# Patient Record
Sex: MALE | Race: BLACK OR AFRICAN AMERICAN | NOT HISPANIC OR LATINO | Employment: PART TIME | URBAN - METROPOLITAN AREA
[De-identification: names, ages, dates, MRNs, and addresses within clinical notes are randomized per-mention and may not be internally consistent; named-entity substitution may affect disease eponyms.]

---

## 2017-06-13 ENCOUNTER — PRE VISIT (OUTPATIENT)
Dept: NEUROLOGY | Facility: CLINIC | Age: 22
End: 2017-06-13

## 2017-06-13 NOTE — TELEPHONE ENCOUNTER
1.  Date/reason for appt:7/10/17, Seizures  2.  Referring provider:Self  3.  Call to patient (Yes / No - short description):yes, (spoke to home care person)  4.  Previous care at / records requested from:   [facility]- emailed DELILAH [email or web address]

## 2020-10-23 ENCOUNTER — HOSPITAL ENCOUNTER (EMERGENCY)
Facility: CLINIC | Age: 25
Discharge: HOME | End: 2020-10-23
Attending: EMERGENCY MEDICINE | Admitting: EMERGENCY MEDICINE
Payer: MEDICARE

## 2020-10-23 VITALS
TEMPERATURE: 98.2 F | SYSTOLIC BLOOD PRESSURE: 117 MMHG | OXYGEN SATURATION: 96 % | HEART RATE: 96 BPM | DIASTOLIC BLOOD PRESSURE: 66 MMHG | WEIGHT: 220 LBS | BODY MASS INDEX: 34.45 KG/M2

## 2020-10-23 DIAGNOSIS — R51.9 ACUTE NONINTRACTABLE HEADACHE, UNSPECIFIED HEADACHE TYPE: ICD-10-CM

## 2020-10-23 LAB
ALBUMIN SERPL-MCNC: 3.7 G/DL (ref 3.4–5)
ALBUMIN UR-MCNC: NEGATIVE MG/DL
ALP SERPL-CCNC: 107 U/L (ref 40–150)
ALT SERPL W P-5'-P-CCNC: 63 U/L (ref 0–70)
ANION GAP SERPL CALCULATED.3IONS-SCNC: 7 MMOL/L (ref 3–14)
APPEARANCE UR: CLEAR
AST SERPL W P-5'-P-CCNC: 35 U/L (ref 0–45)
BASOPHILS # BLD AUTO: 0 10E9/L (ref 0–0.2)
BASOPHILS NFR BLD AUTO: 0.6 %
BILIRUB SERPL-MCNC: 0.5 MG/DL (ref 0.2–1.3)
BILIRUB UR QL STRIP: NEGATIVE
BUN SERPL-MCNC: 9 MG/DL (ref 7–30)
CALCIUM SERPL-MCNC: 9 MG/DL (ref 8.5–10.1)
CHLORIDE SERPL-SCNC: 104 MMOL/L (ref 94–109)
CO2 SERPL-SCNC: 26 MMOL/L (ref 20–32)
COLOR UR AUTO: ABNORMAL
CREAT SERPL-MCNC: 0.78 MG/DL (ref 0.66–1.25)
DIFFERENTIAL METHOD BLD: ABNORMAL
EOSINOPHIL # BLD AUTO: 0.3 10E9/L (ref 0–0.7)
EOSINOPHIL NFR BLD AUTO: 4.6 %
ERYTHROCYTE [DISTWIDTH] IN BLOOD BY AUTOMATED COUNT: 12 % (ref 10–15)
GFR SERPL CREATININE-BSD FRML MDRD: >90 ML/MIN/{1.73_M2}
GLUCOSE BLDC GLUCOMTR-MCNC: 166 MG/DL (ref 70–99)
GLUCOSE SERPL-MCNC: 105 MG/DL (ref 70–99)
GLUCOSE UR STRIP-MCNC: 100 MG/DL
HCT VFR BLD AUTO: 41.1 % (ref 40–53)
HGB BLD-MCNC: 13.5 G/DL (ref 13.3–17.7)
HGB UR QL STRIP: NEGATIVE
IMM GRANULOCYTES # BLD: 0 10E9/L (ref 0–0.4)
IMM GRANULOCYTES NFR BLD: 0.5 %
INR PPP: 1.09 (ref 0.86–1.14)
KETONES UR STRIP-MCNC: NEGATIVE MG/DL
LACTATE BLD-SCNC: 1.6 MMOL/L (ref 0.7–2)
LEUKOCYTE ESTERASE UR QL STRIP: NEGATIVE
LIPASE SERPL-CCNC: 128 U/L (ref 73–393)
LYMPHOCYTES # BLD AUTO: 1.1 10E9/L (ref 0.8–5.3)
LYMPHOCYTES NFR BLD AUTO: 17.1 %
MCH RBC QN AUTO: 31.7 PG (ref 26.5–33)
MCHC RBC AUTO-ENTMCNC: 32.8 G/DL (ref 31.5–36.5)
MCV RBC AUTO: 97 FL (ref 78–100)
MONOCYTES # BLD AUTO: 0.7 10E9/L (ref 0–1.3)
MONOCYTES NFR BLD AUTO: 10.6 %
NEUTROPHILS # BLD AUTO: 4.3 10E9/L (ref 1.6–8.3)
NEUTROPHILS NFR BLD AUTO: 66.6 %
NITRATE UR QL: NEGATIVE
NRBC # BLD AUTO: 0 10*3/UL
NRBC BLD AUTO-RTO: 0 /100
PH UR STRIP: 6 PH (ref 5–7)
PLATELET # BLD AUTO: 290 10E9/L (ref 150–450)
POTASSIUM SERPL-SCNC: 4 MMOL/L (ref 3.4–5.3)
PROT SERPL-MCNC: 7.8 G/DL (ref 6.8–8.8)
RBC # BLD AUTO: 4.26 10E12/L (ref 4.4–5.9)
RBC #/AREA URNS AUTO: 1 /HPF (ref 0–2)
SODIUM SERPL-SCNC: 137 MMOL/L (ref 133–144)
SOURCE: ABNORMAL
SP GR UR STRIP: 1.02 (ref 1–1.03)
UROBILINOGEN UR STRIP-MCNC: 2 MG/DL (ref 0–2)
WBC # BLD AUTO: 6.5 10E9/L (ref 4–11)
WBC #/AREA URNS AUTO: 1 /HPF (ref 0–5)

## 2020-10-23 PROCEDURE — 96374 THER/PROPH/DIAG INJ IV PUSH: CPT

## 2020-10-23 PROCEDURE — 83690 ASSAY OF LIPASE: CPT | Performed by: EMERGENCY MEDICINE

## 2020-10-23 PROCEDURE — 96375 TX/PRO/DX INJ NEW DRUG ADDON: CPT

## 2020-10-23 PROCEDURE — 93005 ELECTROCARDIOGRAM TRACING: CPT

## 2020-10-23 PROCEDURE — 99285 EMERGENCY DEPT VISIT HI MDM: CPT | Mod: 25

## 2020-10-23 PROCEDURE — 81001 URINALYSIS AUTO W/SCOPE: CPT | Performed by: EMERGENCY MEDICINE

## 2020-10-23 PROCEDURE — 258N000003 HC RX IP 258 OP 636: Performed by: EMERGENCY MEDICINE

## 2020-10-23 PROCEDURE — 250N000011 HC RX IP 250 OP 636: Performed by: EMERGENCY MEDICINE

## 2020-10-23 PROCEDURE — 80053 COMPREHEN METABOLIC PANEL: CPT | Performed by: EMERGENCY MEDICINE

## 2020-10-23 PROCEDURE — 96361 HYDRATE IV INFUSION ADD-ON: CPT

## 2020-10-23 PROCEDURE — 999N001017 HC STATISTIC GLUCOSE BY METER IP

## 2020-10-23 PROCEDURE — 85610 PROTHROMBIN TIME: CPT | Performed by: EMERGENCY MEDICINE

## 2020-10-23 PROCEDURE — 85025 COMPLETE CBC W/AUTO DIFF WBC: CPT | Performed by: EMERGENCY MEDICINE

## 2020-10-23 PROCEDURE — 99284 EMERGENCY DEPT VISIT MOD MDM: CPT | Mod: 25

## 2020-10-23 PROCEDURE — 83605 ASSAY OF LACTIC ACID: CPT | Performed by: EMERGENCY MEDICINE

## 2020-10-23 RX ORDER — METOCLOPRAMIDE HYDROCHLORIDE 5 MG/ML
10 INJECTION INTRAMUSCULAR; INTRAVENOUS ONCE
Status: COMPLETED | OUTPATIENT
Start: 2020-10-23 | End: 2020-10-23

## 2020-10-23 RX ORDER — KETOROLAC TROMETHAMINE 30 MG/ML
30 INJECTION, SOLUTION INTRAMUSCULAR; INTRAVENOUS ONCE
Status: COMPLETED | OUTPATIENT
Start: 2020-10-23 | End: 2020-10-23

## 2020-10-23 RX ORDER — ONDANSETRON 4 MG/1
4 TABLET, ORALLY DISINTEGRATING ORAL EVERY 6 HOURS PRN
Qty: 10 TABLET | Refills: 0 | Status: SHIPPED | OUTPATIENT
Start: 2020-10-23 | End: 2020-10-26

## 2020-10-23 RX ORDER — DEXAMETHASONE SODIUM PHOSPHATE 10 MG/ML
10 INJECTION, SOLUTION INTRAMUSCULAR; INTRAVENOUS ONCE
Status: COMPLETED | OUTPATIENT
Start: 2020-10-23 | End: 2020-10-23

## 2020-10-23 RX ORDER — SUMATRIPTAN 5 MG/1
SPRAY NASAL
Qty: 1 EACH | Refills: 0 | Status: ON HOLD | OUTPATIENT
Start: 2020-10-23 | End: 2024-07-04

## 2020-10-23 RX ORDER — ONDANSETRON 4 MG/1
4 TABLET, ORALLY DISINTEGRATING ORAL ONCE
Status: COMPLETED | OUTPATIENT
Start: 2020-10-23 | End: 2020-10-23

## 2020-10-23 RX ORDER — ONDANSETRON 2 MG/ML
4 INJECTION INTRAMUSCULAR; INTRAVENOUS EVERY 30 MIN PRN
Status: DISCONTINUED | OUTPATIENT
Start: 2020-10-23 | End: 2020-10-23

## 2020-10-23 RX ORDER — HYDROMORPHONE HYDROCHLORIDE 1 MG/ML
0.5 INJECTION, SOLUTION INTRAMUSCULAR; INTRAVENOUS; SUBCUTANEOUS
Status: DISCONTINUED | OUTPATIENT
Start: 2020-10-23 | End: 2020-10-23

## 2020-10-23 RX ORDER — DIPHENHYDRAMINE HYDROCHLORIDE 50 MG/ML
25 INJECTION INTRAMUSCULAR; INTRAVENOUS ONCE
Status: COMPLETED | OUTPATIENT
Start: 2020-10-23 | End: 2020-10-23

## 2020-10-23 RX ORDER — SODIUM CHLORIDE 9 MG/ML
INJECTION, SOLUTION INTRAVENOUS CONTINUOUS
Status: DISCONTINUED | OUTPATIENT
Start: 2020-10-23 | End: 2020-10-24 | Stop reason: HOSPADM

## 2020-10-23 RX ADMIN — DIPHENHYDRAMINE HYDROCHLORIDE 25 MG: 50 INJECTION, SOLUTION INTRAMUSCULAR; INTRAVENOUS at 21:04

## 2020-10-23 RX ADMIN — DEXAMETHASONE SODIUM PHOSPHATE 10 MG: 10 INJECTION, SOLUTION INTRAMUSCULAR; INTRAVENOUS at 21:29

## 2020-10-23 RX ADMIN — KETOROLAC TROMETHAMINE 30 MG: 30 INJECTION, SOLUTION INTRAMUSCULAR at 21:29

## 2020-10-23 RX ADMIN — METOCLOPRAMIDE HYDROCHLORIDE 10 MG: 5 INJECTION INTRAMUSCULAR; INTRAVENOUS at 21:06

## 2020-10-23 RX ADMIN — SODIUM CHLORIDE 1000 ML: 9 INJECTION, SOLUTION INTRAVENOUS at 21:04

## 2020-10-23 RX ADMIN — ONDANSETRON 4 MG: 4 TABLET, ORALLY DISINTEGRATING ORAL at 19:12

## 2020-10-23 NOTE — ED AVS SNAPSHOT
[facility] Emergency Dept  [address]  [facility] [address]  Phone: [phone]  Fax: [phone]                                    Eric Fall   MRN: [id number]    Department: [facility] Emergency Dept   Date of Visit: 10/23/2020           After Visit Summary Signature Page    I have received my discharge instructions, and my questions have been answered. I have discussed any challenges I see with this plan with the nurse or doctor.    ..........................................................................................................................................  Patient/Patient Representative Signature      ..........................................................................................................................................  Patient Representative Print Name and Relationship to Patient    ..................................................               ................................................  Date                                   Time    ..........................................................................................................................................  Reviewed by Signature/Title    ...................................................              ..............................................  Date                                               Time          22EPIC Rev 08/18

## 2020-10-24 ENCOUNTER — APPOINTMENT (OUTPATIENT)
Dept: GENERAL RADIOLOGY | Facility: CLINIC | Age: 25
End: 2020-10-24
Attending: EMERGENCY MEDICINE
Payer: MEDICARE

## 2020-10-24 ENCOUNTER — HOSPITAL ENCOUNTER (EMERGENCY)
Facility: CLINIC | Age: 25
Discharge: HOME | End: 2020-10-24
Attending: EMERGENCY MEDICINE | Admitting: EMERGENCY MEDICINE
Payer: MEDICARE

## 2020-10-24 VITALS
HEART RATE: 88 BPM | SYSTOLIC BLOOD PRESSURE: 108 MMHG | DIASTOLIC BLOOD PRESSURE: 61 MMHG | TEMPERATURE: 98.3 F | RESPIRATION RATE: 16 BRPM

## 2020-10-24 DIAGNOSIS — R11.2 NON-INTRACTABLE VOMITING WITH NAUSEA, UNSPECIFIED VOMITING TYPE: ICD-10-CM

## 2020-10-24 DIAGNOSIS — Z20.822 SUSPECTED COVID-19 VIRUS INFECTION: ICD-10-CM

## 2020-10-24 DIAGNOSIS — R51.9 NONINTRACTABLE EPISODIC HEADACHE, UNSPECIFIED HEADACHE TYPE: ICD-10-CM

## 2020-10-24 DIAGNOSIS — R73.9 HYPERGLYCEMIA: ICD-10-CM

## 2020-10-24 LAB
ANION GAP SERPL CALCULATED.3IONS-SCNC: 9 MMOL/L (ref 3–14)
BASE DEFICIT BLDV-SCNC: 1.7 MMOL/L
BASOPHILS # BLD AUTO: 0 10E9/L (ref 0–0.2)
BASOPHILS NFR BLD AUTO: 0.2 %
BUN SERPL-MCNC: 13 MG/DL (ref 7–30)
CALCIUM SERPL-MCNC: 8.5 MG/DL (ref 8.5–10.1)
CHLORIDE SERPL-SCNC: 101 MMOL/L (ref 94–109)
CO2 SERPL-SCNC: 23 MMOL/L (ref 20–32)
CREAT SERPL-MCNC: 0.84 MG/DL (ref 0.66–1.25)
DIFFERENTIAL METHOD BLD: ABNORMAL
EOSINOPHIL # BLD AUTO: 0 10E9/L (ref 0–0.7)
EOSINOPHIL NFR BLD AUTO: 0 %
ERYTHROCYTE [DISTWIDTH] IN BLOOD BY AUTOMATED COUNT: 12.3 % (ref 10–15)
GFR SERPL CREATININE-BSD FRML MDRD: >90 ML/MIN/{1.73_M2}
GLUCOSE SERPL-MCNC: 273 MG/DL (ref 70–99)
HCO3 BLDV-SCNC: 24 MMOL/L (ref 21–28)
HCT VFR BLD AUTO: 36.4 % (ref 40–53)
HGB BLD-MCNC: 12.8 G/DL (ref 13.3–17.7)
IMM GRANULOCYTES # BLD: 0 10E9/L (ref 0–0.4)
IMM GRANULOCYTES NFR BLD: 0.3 %
INTERPRETATION ECG - MUSE: NORMAL
LYMPHOCYTES # BLD AUTO: 1.2 10E9/L (ref 0.8–5.3)
LYMPHOCYTES NFR BLD AUTO: 10.3 %
MCH RBC QN AUTO: 33.1 PG (ref 26.5–33)
MCHC RBC AUTO-ENTMCNC: 35.2 G/DL (ref 31.5–36.5)
MCV RBC AUTO: 94 FL (ref 78–100)
MONOCYTES # BLD AUTO: 1 10E9/L (ref 0–1.3)
MONOCYTES NFR BLD AUTO: 9.1 %
NEUTROPHILS # BLD AUTO: 9.2 10E9/L (ref 1.6–8.3)
NEUTROPHILS NFR BLD AUTO: 80.1 %
NRBC # BLD AUTO: 0 10*3/UL
NRBC BLD AUTO-RTO: 0 /100
O2/TOTAL GAS SETTING VFR VENT: 0 %
PCO2 BLDV: 41 MM HG (ref 40–50)
PH BLDV: 7.37 PH (ref 7.32–7.43)
PLATELET # BLD AUTO: 339 10E9/L (ref 150–450)
PO2 BLDV: 63 MM HG (ref 25–47)
POTASSIUM SERPL-SCNC: 4.6 MMOL/L (ref 3.4–5.3)
RBC # BLD AUTO: 3.87 10E12/L (ref 4.4–5.9)
SODIUM SERPL-SCNC: 133 MMOL/L (ref 133–144)
WBC # BLD AUTO: 11.5 10E9/L (ref 4–11)

## 2020-10-24 PROCEDURE — C9803 HOPD COVID-19 SPEC COLLECT: HCPCS

## 2020-10-24 PROCEDURE — 82803 BLOOD GASES ANY COMBINATION: CPT | Performed by: EMERGENCY MEDICINE

## 2020-10-24 PROCEDURE — 99284 EMERGENCY DEPT VISIT MOD MDM: CPT | Mod: 25

## 2020-10-24 PROCEDURE — 250N000011 HC RX IP 250 OP 636: Performed by: EMERGENCY MEDICINE

## 2020-10-24 PROCEDURE — 80048 BASIC METABOLIC PNL TOTAL CA: CPT | Performed by: EMERGENCY MEDICINE

## 2020-10-24 PROCEDURE — 258N000003 HC RX IP 258 OP 636: Performed by: EMERGENCY MEDICINE

## 2020-10-24 PROCEDURE — U0003 INFECTIOUS AGENT DETECTION BY NUCLEIC ACID (DNA OR RNA); SEVERE ACUTE RESPIRATORY SYNDROME CORONAVIRUS 2 (SARS-COV-2) (CORONAVIRUS DISEASE [COVID-19]), AMPLIFIED PROBE TECHNIQUE, MAKING USE OF HIGH THROUGHPUT TECHNOLOGIES AS DESCRIBED BY CMS-2020-01-R: HCPCS | Performed by: EMERGENCY MEDICINE

## 2020-10-24 PROCEDURE — 96361 HYDRATE IV INFUSION ADD-ON: CPT

## 2020-10-24 PROCEDURE — 96374 THER/PROPH/DIAG INJ IV PUSH: CPT

## 2020-10-24 PROCEDURE — 71045 X-RAY EXAM CHEST 1 VIEW: CPT

## 2020-10-24 PROCEDURE — 85025 COMPLETE CBC W/AUTO DIFF WBC: CPT | Performed by: EMERGENCY MEDICINE

## 2020-10-24 RX ORDER — METOCLOPRAMIDE HYDROCHLORIDE 5 MG/ML
10 INJECTION INTRAMUSCULAR; INTRAVENOUS ONCE
Status: COMPLETED | OUTPATIENT
Start: 2020-10-24 | End: 2020-10-24

## 2020-10-24 RX ORDER — METOCLOPRAMIDE 10 MG/1
10 TABLET ORAL 3 TIMES DAILY PRN
Qty: 30 TABLET | Refills: 0 | Status: SHIPPED | OUTPATIENT
Start: 2020-10-24 | End: 2022-01-14

## 2020-10-24 RX ORDER — SODIUM CHLORIDE 9 MG/ML
INJECTION, SOLUTION INTRAVENOUS CONTINUOUS
Status: DISCONTINUED | OUTPATIENT
Start: 2020-10-24 | End: 2020-10-25 | Stop reason: HOSPADM

## 2020-10-24 RX ADMIN — METOCLOPRAMIDE HYDROCHLORIDE 10 MG: 5 INJECTION INTRAMUSCULAR; INTRAVENOUS at 21:05

## 2020-10-24 RX ADMIN — SODIUM CHLORIDE 1000 ML: 9 INJECTION, SOLUTION INTRAVENOUS at 21:06

## 2020-10-24 ASSESSMENT — ENCOUNTER SYMPTOMS
NAUSEA: 1
FEVER: 0
FREQUENCY: 1
VOMITING: 1
COUGH: 1
HEADACHES: 1
DYSURIA: 0

## 2020-10-24 NOTE — ED PROVIDER NOTES
History   Chief Complaint:  Vomiting and Headache     HPI   Eric Fall is a 25 year old male with history of TBI, headache disorder, and type 2 diabetes mellitus who presents with several days of his migraine headache with nausea  and vomiting.  States that he usually takes Imitrex nasal spray but has not had any.  He describes as light sensitivity and nausea and a generalized headache that is similar to his prior migraines. He denies fever, cough, shortness of breath, abdominal pain, or chest pain.    Allergies:  Haldol   Quetiapine  Risperdal  Zyprexa  Trazodone    Medications:   Insulin Aspart  Guanfacine  Basaglar  Lantus  Atropine  Desmopressin  Loxapine  Protonix  Metformin   Atorvastatin  Amlodipine   Imitrex    Past Medical History:    ADHD  Diabetes mellitus type 2  Hip dysplasia  Mental retardation  Oppositional defiant disorder  PTSD  Seizures  TBI   Hypertension  Schizophrenia   Chronic tachycardia   Headache disorder     Past Surgical History:    Bilateral hip surgery      Family History:    No past pertinent family history.    Social History:  Smoking Status: Never Smoker  Smokeless Tobacco: Never Used  Alcohol Use: No  Drug Use: No  PCP: Michelle Jackman     Review of Systems  Please see HPI. All other systems reviewed and negative.     Physical Exam     Patient Vitals for the past 24 hrs:   BP Temp Temp src Pulse SpO2 Weight   10/23/20 2145 117/66 -- -- 96 96 % --   10/23/20 2130 121/68 -- -- 97 99 % --   10/23/20 2115 130/70 -- -- 103 -- --   10/23/20 2100 138/88 -- -- 102 -- --   10/23/20 2045 132/88 -- -- 108 -- --   10/23/20 2000 (!) 147/92 -- -- 105 99 % --   10/23/20 1912 124/78 -- -- -- -- --   10/23/20 1908 -- 98.2  F (36.8  C) Temporal 119 98 % 99.8 kg (220 lb)       Physical Exam  Constitutional:       Appearance: He is well-developed.   HENT:      Right Ear: Tympanic membrane and external ear normal.      Left Ear: Tympanic membrane and external ear normal.       Mouth/Throat:      Mouth: Mucous membranes are moist.      Pharynx: Oropharynx is clear. No oropharyngeal exudate or posterior oropharyngeal erythema.   Eyes:      General: No scleral icterus.     Conjunctiva/sclera: Conjunctivae normal.      Pupils: Pupils are equal, round, and reactive to light.   Neck:      Musculoskeletal: Normal range of motion and neck supple.   Cardiovascular:      Rate and Rhythm: Normal rate and regular rhythm.      Heart sounds: Normal heart sounds. No murmur. No friction rub. No gallop.    Pulmonary:      Effort: Pulmonary effort is normal. No respiratory distress.      Breath sounds: Normal breath sounds. No wheezing or rales.   Abdominal:      General: Bowel sounds are normal. There is no distension.      Palpations: Abdomen is soft. There is no mass.      Tenderness: There is no abdominal tenderness.   Musculoskeletal: Normal range of motion.   Skin:     General: Skin is warm and dry.      Findings: No rash.   Neurological:      General: No focal deficit present.      Mental Status: He is alert.      Cranial Nerves: No cranial nerve deficit.           Emergency Department Course   ECG:  ECG taken at 2015  Sinus tachycardia  Otherwise normal ECG  Rate 105 bpm. PA interval 150 ms. QRS duration 78 ms. QT/QTc 328/433 ms. P-R-T axes 52  26  37.    Laboratory:  Laboratory findings were communicated with the patient who voiced understanding of the findings.    CBC: WBC 6.5, HGB 13.5,   CMP: glucose 105 o/w WNL (Creatinine 0.78)  INR: 1.09  Lactic acid whole blood(result time 2102) 1.6   Lipase: 128    Glucose by meter(1909): 166(H)     Interventions:  1912 Zofran 4 mg IV  2104 NS 1000 mL IV  2104 Benadryl 25 mg IV  2106 Reglan 10 mg IV  2129 Toradol 30 mg IV  2129 Decadron 10 mg IV    Emergency Department Course:  Nursing notes and vitals reviewed.    2010 I performed an exam of the patient as documented above.     2219 I was informed the patient's headache is improved.     2240 I  rechecked the patient. Explained findings to the Patient.    Findings and plan explained to the Patient. Patient discharged home with instructions regarding supportive care, medications, and reasons to return. The importance of close follow-up was reviewed. The patient was prescribed Zofran and Imitrex.      Impression & Plan      Medical Decision Making:  Eric Fall is a 25 year old male who presents with a headache. He has history of headaches.  I considered a broad differential diagnosis for this patient including tension, migraine, analgesic rebound, occipital neuralgia, etc.  I also considered other less common but serious causes considered included meningitis, encephalitis, subarachnoid bleed, temporal arteritis, stroke, tumor, etc.  He has no signs of serious headache etiologies at this point.  No advanced imaging is indicated, nor is CT/lumbar puncture for SAH.  His questions were answered and he feels improved after above interventions in ED.  Supportive outpatient management is therefore indicated.  Headache precautions given for home.      Diagnosis:    ICD-10-CM    1. Acute nonintractable headache, unspecified headache type  R51.9        Disposition:   discharged to home    Discharge Medications:  New Prescriptions    ONDANSETRON (ZOFRAN ODT) 4 MG ODT TAB    Take 1 tablet (4 mg) by mouth every 6 hours as needed for nausea    SUMATRIPTAN (IMITREX) 5 MG/ACT NASAL SPRAY    1-2 sprays,  may repeat every 2 hours until a maximum dose 40mg/24 hours       Scribe Disclosure:  Caitlyn MENDOZA, am serving as a scribe at 8:15 PM on 10/23/2020 to document services personally performed by Denis Monet MD based on my observations and the provider's statements to me.   [facility] EMERGENCY DEPARTMENT       Denis Monet MD  10/23/20 6946

## 2020-10-24 NOTE — ED TRIAGE NOTES
"Pt reports complex medical hx of diabetes. HTN and TBI. Pt reports headache, elevated BS and vomiting since Wed. He notes diarrhea for \"weeks\"  "

## 2020-10-24 NOTE — DISCHARGE INSTRUCTIONS
Use zofran as needed  Discharge Instructions  Headache    You were seen today for a headache. Headaches may be caused by many different things such as muscle tension, sinus inflammation, anxiety and stress, having too little sleep, too much alcohol, some medical conditions or injury. You may have a migraine, which is caused by changes in the blood vessels in your head.  At this time your provider does not find that your headache is a sign of anything dangerous or life-threatening.  However, sometimes the signs of serious illness do not show up right away.      Generally, every Emergency Department visit should have a follow-up clinic visit with either a primary or a specialty clinic/provider. Please follow-up as instructed by your emergency provider today.    Return to the Emergency Department if:  You get a new fever of 100.4 F or higher.  Your headache gets much worse.  You get a stiff neck with your headache.  You get a new headache that is significantly different or worse than headaches you have had before.  You are vomiting (throwing up) and cannot keep food or water down.  You have blurry or double vision or other problems with your eyes.  You have a new weakness on one side of your body.  You have difficulty with balance which is new.  You or your family thinks you are confused.  You have a seizure.    What can I do to help myself?  Pain medications - You may take a pain medication such as Tylenol  (acetaminophen), Advil , Motrin  (ibuprofen) or Aleve  (naproxen).  Take a pain reliever as soon as you notice symptoms.  Starting medications as soon as you start to have symptoms may lessen the amount of pain you have.  Relaxing in a quiet, dark room may help.  Get enough sleep and eat meals regularly.  You may need to watch for certain foods or other things which may trigger your headaches.  Keeping a journal of your headaches and possible triggers may help you and your primary provider to identify things which  you should avoid which may be causing your headaches.  If you were given a prescription for medicine here today, be sure to read all of the information (including the package insert) that comes with your prescription.  This will include important information about the medicine, its side effects, and any warnings that you need to know about.  The pharmacist who fills the prescription can provide more information and answer questions you may have about the medicine.  If you have questions or concerns that the pharmacist cannot address, please call or return to the Emergency Department.   Remember that you can always come back to the Emergency Department if you are not able to see your regular provider in the amount of time listed above, if you get any new symptoms, or if there is anything that worries you.

## 2020-10-24 NOTE — ED AVS SNAPSHOT
[facility] Emergency Dept  [address] [facility] [address]  Phone: [phone]  Fax: [phone]                                    Eric Fall   MRN: [id number]    Department: [facility] Emergency Dept   Date of Visit: 10/24/2020           After Visit Summary Signature Page    I have received my discharge instructions, and my questions have been answered. I have discussed any challenges I see with this plan with the nurse or doctor.    ..........................................................................................................................................  Patient/Patient Representative Signature      ..........................................................................................................................................  Patient Representative Print Name and Relationship to Patient    ..................................................               ................................................  Date                                   Time    ..........................................................................................................................................  Reviewed by Signature/Title    ...................................................              ..............................................  Date                                               Time          22EPIC Rev 08/18

## 2020-10-25 NOTE — ED PROVIDER NOTES
History     Chief Complaint:  Vomiting, Headache, and Cough      HPI   Eric Fall is a 25 year old male who presents for the evaluation of vomiting, headache, and cough. Of note, the patient was seen at [facility] yesterday for headache and vomiting, was treated with IV fluids, Zofran, Benadryl, Reglan, Toradol, and Decadron, had negative blood laboratory testing, and was discharged to home started on Imitrex and Zofran. The patient reports that since being discharged from the hospital yesterday, he has still been experiencing a persistent headache, cough, and vomiting, prompting him to the ED. Patient states that his headache is typical to his migraines and that he was not able to fill the Imitrex yet. Patient also endorses increased frequency of urination. No known sick contacts. The patient denies fever, dysuria, and other issues.      Laboratory Results from Yesterday done at  [facility]:  CBC: WBC 6.5, HGB 13.5,   CMP: glucose 105 o/w WNL (Creatinine 0.78)  INR: 1.09  Lactic acid whole blood(result time 2102) 1.6   Lipase: 128  Glucose by meter(1909): 166(H)     Allergies:  Haldol  Quetiapine  Risperdal  Zyprexa      Medications:    Abilify  Benadryl  Depakote  Neurontin  Lactaid  Zofran  Imitrex     Past Medical History:    ADHD  Diabetes  Hip dysplasia  Mental retardation  Oppositional defiant disorder  PTSD  Seizures  TBI  Dysuria  Anal fissure  Constipation  Suicidal ideation    Past Surgical History:    History reviewed. No pertinent surgical history.     Family History:    History reviewed. No pertinent family history.      Social History:  Smoking status: Never smoker  Alcohol use: No   Drug use: No  PCP: Michelle Jackman   Marital Status:  Single [1]     Review of Systems   Constitutional: Negative for fever.   Respiratory: Positive for cough.    Gastrointestinal: Positive for nausea and vomiting.   Genitourinary: Positive for frequency. Negative for  dysuria.   Neurological: Positive for headaches.   All other systems reviewed and are negative.        Physical Exam     Patient Vitals for the past 24 hrs:   BP Temp Temp src Pulse Resp   10/24/20 2100 108/61 98.3  F (36.8  C) Oral 88 16      Physical Exam  General: Alert, interactive in mild distress  Head:  Scalp is atraumatic  Eyes:  The pupils are equal, round, and reactive to light    EOM's intact    No scleral icterus  ENT:      Nose:  The external nose is normal  Ears:  External ears are normal  Mouth/Throat: The oropharynx is normal    Mucus membranes are dry      Neck:  Normal range of motion.      There is no rigidity.    Trachea is in the midline         CV:  Regular rate and rhythm    No murmur   Resp:  Breath sounds are clear bilaterally    Non-labored, no retractions or accessory muscle use      GI:  Abdomen is soft, no distension, no tenderness.       MS:  Normal strength in all 4 extremities  Skin:  Warm and dry, No rash or lesions noted.  Neuro: Strength 5/5 x4.  Sensation intact  In all 4 extremities.      Cranial nerves 2-12 grossly intact.    GCS: 15  Psych:  Awake. Alert.  Normal affect.      Appropriate interactions.    Emergency Department Course   Imaging:  Radiographic findings were communicated with the patient who voiced understanding of the findings.  XR Chest Port 1 View   IMPRESSION:   1. Hypoaeration. This could be due to poor inspiratory effort.  2. Top normal size heart is likely due to the AP technique and  hypoaeration. Cardiomegaly is considered less likely.  3. No other evidence of acute cardiopulmonary disease is seen. No  pneumonia is identified.  As read by Radiology.    Laboratory:  BMP: Glucose 273 (H), WNL (Creatinine 0.84)  CBC: WBC 11.5 (H), HGB 12.8 (L), WNL ()  Blood gas venous: pO2 63 (H)    Symptomatic COVID-19 Virus (Coronavirus) by PCR Nasopharyngeal swab: Pending     Interventions:  2105, Reglan, 10 mg, IV  2106, NS 1L IV Bolus     Emergency Department  Course:  Past medical records, nursing notes, and vitals reviewed.  2110: I performed an exam of the patient and obtained history, as documented above.     IV inserted and blood drawn.     The patient had a portable chest x-ray performed, findings above.    2220: I rechecked the patient. Explained findings to patient.     Findings and plan explained to the Patient. Patient discharged home with instructions regarding supportive care, medications, and reasons to return. The importance of close follow-up was reviewed. The patient was prescribed Reglan.       Impression & Plan    Covid-19  Eric Fall was evaluated during a global COVID-19 pandemic, which necessitated consideration that the patient might be at risk for infection with the SARS-CoV-2 virus that causes COVID-19.   Applicable protocols for evaluation were followed during the patient's care. COVID-19 was considered as part of the patient's evaluation. The plan for testing is: a test was obtained during this visit.    Medical Decision Making:  Eric Fall is a 25 year old male who was seen and evaluated. Yesterday's workup was reviewed. Findings are consistent with a likely viral process, potentially COVID-19 infection. There are no signs of hypoxia or pulmonary infiltrate to suggest pneumonia. No signs of meningitis, severe sepsis, septic shock, or more concerning illness. His UA was unremarkable yesterday and I do not believe this needed to be repeated. He did receive steroids yesterday and does have hyperglycemia, I think this is likely cause and effect. There is no signs of diabetic ketoacidosis. I will discharge him with Reglan for both is headache and nausea/vomiting. I have recommended follow up with his PCP and quarantine until his symptoms are resolved.     Diagnosis:    ICD-10-CM    1. Suspected COVID-19 virus infection  Z20.828    2. Nonintractable episodic headache, unspecified headache type  R51.9    3. Non-intractable  vomiting with nausea, unspecified vomiting type  R11.2    4. Hyperglycemia  R73.9        Disposition:  Discharged to home with Reglan.    Discharge Medications:  New Prescriptions    METOCLOPRAMIDE (REGLAN) 10 MG TABLET    Take 1 tablet (10 mg) by mouth 3 times daily as needed (N/V, Headache)     Scribe Disclosure:  I, David Pulliam, am serving as a scribe at 8:29 PM on 10/24/2020 to document services personally performed by Elver Abreu MD based on my observations and the provider's statements to me.      David Pulliam  10/24/2020   [facility] EMERGENCY DEPT       Elver Abreu MD  10/24/20 2488

## 2020-10-25 NOTE — DISCHARGE INSTRUCTIONS
Discharge Instructions  COVID-19    COVID-19 is the disease caused by a new coronavirus. The virus spreads from person-to-person primarily by droplets when an infected person coughs or sneezes and the droplet either lands on another person or that other person touches a surface with the droplet on it. There are tests available to diagnose COVID-19. There is no specific treatment or medicine for the disease.    You may have been diagnosed with COVID, may be being tested for COVID and have a pending test result, or may have been exposed to COVID.    Symptoms of COVID-19  Many people have no symptoms or mild symptoms.  Symptoms may usually appear 4 to 5 days (up to 14 days) after contact with a person with COVID-19. Some people will get severe symptoms and pneumonia. Usual symptoms are:     ? Fever  ? Cough  ? Trouble breathing    Less common symptoms are: Headache, body aches, sore throat, sneezing, diarrhea,loss of taste or smell.    Isolation and Quarantine    You were seen because you have symptoms, had an exposure, or had some other concern about possible COVID. The best way to stop the spread of the virus is to avoid contact with others.  Isolation refers to sick people staying away from people who are not sick. A person in quarantine is limiting activity because they were exposed and are waiting to see if they might become sick.    If you test positive for COVID, you should stay home (isolation) for at least 10 days after your symptoms began, and for 24 hours with no fever and improvement of symptoms--whichever is longer. (Your fever should be gone for 24 hours without using fever-reducing medicine). If you have no symptoms, you should stay home (isolation) for 10 days from the day of the test.    For example, if you have a fever and cough for 6 days, you need to stay home 4 more days with no fever for a total of 10 days. Or, if you have a fever and cough for 10 days, you need to stay home one more day with no  fever for a total of 11 days.    If you have a high-risk exposure to COVID (you spent 15 minutes or more within six feet of somebody who has COVID), you should stay home (quarantine) for 14 days. Even if you test negative for COVID, the CDC recommends a 14-day quarantine from the time of your last exposure to that individual.    If you have symptoms but a negative test, you should stay at home until you are symptom-free and without fever for 24 hours, using the same judgment you would for when it is safe to return to work/school from strep throat, influenza, or the common cold. If you worsen, you should consider being re-evaluated.    If you are being tested for COVID and your test is pending, you should stay home until you know your test result.    How should I protect myself and others?    Do not go to work or school. Have a friend or relative do your shopping. Do not use public transportation (bus, train) or ridesharing (Lyft, Uber).    Separate yourself from other people in your home.?As much as possible, you should stay in one room and away from other people in your home. Also, use a separate bathroom, if possible. Avoid handling pets or other animals while sick.     Wear a facemask if you need to be around other people and cover your mouth and nose with a tissue when you cough or sneeze.     Avoid sharing personal household items. You should not share dishes, drinking glasses, forks/knives/spoons, towels, or bedding with other people in your home. After using these items, they should be washed with soap and water. Clean parts of your home that are touched often (doorknobs, faucets, countertops, etc.) daily.     Wash your hands often with soap and water for at least 20 seconds or use an alcohol-based hand  containing at least 60% alcohol.     Avoid touching your face.    Treat your symptoms. You can take Acetaminophen (Tylenol) to treat body aches and fever as needed for comfort. Ibuprofen (Advil or  Motrin) can be used as well if you still have symptoms after taking Tylenol. Drink fluids. Rest.    Watch for worsening symptoms such as shortness of breath/difficulty breathing or very severe weakness.    Employers/workplaces are being asked by the Centers for Disease Control (CDC) to not request notes/documentation for you to return to work or prove that you were ill. You may choose to show your employer this paperwork. Also, repeat testing should not be required to return to work.    Return to the Emergency Department if:    If you are developing worsening breathing, shortness of breath, or feel worse you should seek medical attention.  If you are uncertain, contact your health care provider/clinic. If you need emergency medical attention, call 911 and tell them you have been ill.

## 2020-10-25 NOTE — ED NOTES
Patient's group [place] staff- [facility] [phone]. Call with updates or when pt needs a ride

## 2020-10-25 NOTE — ED TRIAGE NOTES
Pt presents with complaints for HA, coughing and vomiting. Pt was seen yesterday and prescribed zofran and imitrex- taking zofran but did not fill imitrex. Pt diabetic and reports high glucose as well. Pt had recent negative covid test.

## 2020-10-26 ENCOUNTER — NURSE TRIAGE (OUTPATIENT)
Dept: NURSING | Facility: CLINIC | Age: 25
End: 2020-10-26

## 2020-10-26 LAB
SARS-COV-2 RNA SPEC QL NAA+PROBE: ABNORMAL
SPECIMEN SOURCE: ABNORMAL

## 2020-10-26 NOTE — TELEPHONE ENCOUNTER
"His home is calling back, so he can hear the precautions he needs to take , now that he is positive for covid.  Coronavirus (COVID-19) Notification    Caller Name (Patient, parent, daughter/son, grandparent, etc)  Patient: Eric Aviles    Reason for call  Notify of Positive Coronavirus (COVID-19) lab results, assess symptoms,  review [facility] recommendations    Lab Result    Lab test:  2019-nCoV rRt-PCR or SARS-CoV-2 PCR    Oropharyngeal AND/OR nasopharyngeal swabs is POSITIVE for 2019-nCoV RNA/SARS-COV-2 PCR (COVID-19 virus)    RN Recommendations/Instructions per [facility] Coronavirus COVID-19 recommendations    Brief introduction script  Introduce self then review script:  \"I am calling on behalf of [employer].  We were notified that your Coronavirus test (COVID-19) for was POSITIVE for the virus.  I have some information to relay to you but first I wanted to mention that the [place] Dept of Health will be contacting you shortly [it's possible MD already called Patient] to talk to you more about how you are feeling and other people you have had contact with who might now also have the virus.  Also, [facility] is Partnering with the [facility] for Covid-19 research, you may be contacted directly by research staff.\"    Assessment (Inquire about Patient's current symptoms)   Assessment   Current Symptoms at time of phone call: (if no symptoms, document No symptoms]   Cough, headache   Symptoms onset (if applicable) A couple of days     If at time of call, Patients symptoms hare worsened, the Patient should contact 911 or have someone drive them to Emergency Dept promptly:      If Patient calling 911, inform 911 personal that you have tested positive for the Coronavirus (COVID-19).  Place mask on and await 911 to arrive.    If Emergency Dept, If possible, please have another adult drive you to the Emergency Dept but you need to wear mask when in contact with other people.      Review " information with Patient    Your result was positive. This means you have COVID-19 (coronavirus).  We have sent you a letter that reviews the information that I'll be reviewing with you now.    How can I protect others?    If you have symptoms: stay home and away from others (self-isolate) until:    You've had no fever--and no medicine that reduces fever--for 1 full day (24 hours). And       Your other symptoms have gotten better. For example, your cough or breathing has improved. And     At least 10 days have passed since your symptoms started. (If you've been told by a doctor that you have a weak immune system, wait 20 days.)     If you don't have symptoms: Stay home and away from others (self-isolate) until at least 10 days have passed since your first positive COVID-19 test. (Date test collected)    During this time:    Stay in your own room, including for meals. Use your own bathroom if you can.    Stay away from others in your home. No hugging, kissing or shaking hands. No visitors.     Don't go to work, school or anywhere else.     Clean  high touch  surfaces often (doorknobs, counters, handles, etc.). Use a household cleaning spray or wipes. You'll find a full list on the EPA website at www.epa.gov/pesticide-registration/list-n-disinfectants-use-against-sars-cov-2.     Cover your mouth and nose with a mask, tissue or other face covering to avoid spreading germs.    Wash your hands and face often with soap and water.    Caregivers in these groups are at risk for severe illness due to COVID-19:  o People 65 years and older  o People who live in a nursing home or long-term care facility  o People with chronic disease (lung, heart, cancer, diabetes, kidney, liver, immunologic)  o People who have a weakened immune system, including those who:  - Are in cancer treatment  - Take medicine that weakens the immune system, such as corticosteroids  - Had a bone marrow or organ transplant  - Have an immune  deficiency  - Have poorly controlled HIV or AIDS  - Are obese (body mass index of 40 or higher)  - Smoke regularly    Caregivers should wear gloves while washing dishes, handling laundry and cleaning bedrooms and bathrooms.    Wash and dry laundry with special caution. Don't shake dirty laundry, and use the warmest water setting you can.    If you have a weakened immune system, ask your doctor about other actions you should take.    For more tips, go to www.cdc.gov/coronavirus/2019-ncov/downloads/10Things.pdf.    You should not go back to work until you meet the guidelines above for ending your home isolation. You don't need to be retested for COVID-19 before going back to work--studies show that you won't spread the virus if it's been at least 10 days since your symptoms started (or 20 days, if you have a weak immune system).    Employers: This document serves as formal notice of your employee's medical guidelines for going back to work. They must meet the above guidelines before going back to work in person.    How can I take care of myself?    1. Get lots of rest. Drink extra fluids (unless a doctor has told you not to).    2. Take Tylenol (acetaminophen) for fever or pain. If you have liver or kidney problems, ask your family doctor if it's okay to take Tylenol.     Take either:     650 mg (two 325 mg pills) every 4 to 6 hours, or     1,000 mg (two 500 mg pills) every 8 hours as needed.     Note: Don't take more than 3,000 mg in one day. Acetaminophen is found in many medicines (both prescribed and over-the-counter medicines). Read all labels to be sure you don't take too much.    For children, check the Tylenol bottle for the right dose (based on their age or weight).    3. If you have other health problems (like cancer, heart failure, an organ transplant or severe kidney disease): Call your specialty clinic if you don't feel better in the next 2 days.    4. Know when to call 911: Emergency warning signs  include:    Trouble breathing or shortness of breath    Pain or pressure in the chest that doesn't go away    Feeling confused like you haven't felt before, or not being able to wake up    Bluish-colored lips or face    5. Sign up for [employer]. We know it's scary to hear that you have COVID-19. We want to track your symptoms to make sure you're okay over the next 2 weeks. Please look for an email from [employer]--this is a free, online program that we'll use to keep in touch. To sign up, follow the link in the email. Learn more at [email or web address].    Where can I get more information?    [facility]: [email or web address]    Coronavirus Basics: www.health.[facility].[place]/diseases/coronavirus/basics.html    What to Do If You're Sick: www.cdc.gov/coronavirus/2019-ncov/about/steps-when-sick.html    Ending Home Isolation: www.cdc.gov/coronavirus/2019-ncov/hcp/disposition-in-home-patients.html     Caring for Someone with COVID-19: www.cdc.gov/coronavirus/2019-ncov/if-you-are-sick/care-for-someone.html     [facility] clinical trials (COVID-19 research studies): clinicalaffairs.[facility]/n-clinical-trials     A Positive COVID-19 letter will be sent via [employer] or the mail. (Exception, no letters sent to Presurgerical/Preprocedure Patients)    Juliana Barron RN/ [facility]      Additional Information    [1] Follow-up call to recent contact AND [2] information only call, no triage required    Protocols used: INFORMATION ONLY CALL-A-AH

## 2020-10-26 NOTE — TELEPHONE ENCOUNTER
"Coronavirus (COVID-19) Notification    Caller Name (Patient, parent, daughter/son, grandparent, etc)  Patient- Eric Aviles    Reason for call  Notify of Positive Coronavirus (COVID-19) lab results, assess symptoms,  review  [employer] recommendations    Lab Result    Lab test:  2019-nCoV rRt-PCR or SARS-CoV-2 PCR    Oropharyngeal AND/OR nasopharyngeal swabs is POSITIVE for 2019-nCoV RNA/SARS-COV-2 PCR (COVID-19 virus)    RN Recommendations/Instructions per [facility] Coronavirus COVID-19 recommendations    Brief introduction script  Introduce self then review script:  \"I am calling on behalf of [employer].  We were notified that your Coronavirus test (COVID-19) for was POSITIVE for the virus.  I have some information to relay to you but first I wanted to mention that the [place] Dept of Health will be contacting you shortly [it's possible MD already called Patient] to talk to you more about how you are feeling and other people you have had contact with who might now also have the virus.  Also,  [employer] [facility] is Partnering with the [facility] for Covid-19 research, you may be contacted directly by research staff.\"    Assessment (Inquire about Patient's current symptoms)   Assessment   Current Symptoms at time of phone call: (if no symptoms, document No symptoms] Cough, headache   Symptoms onset (if applicable) Patient states symptoms present for  \"a few days\"    Chart review shows that patient was in the ED on 10/23 & 10/24 for a headache     If at time of call, Patients symptoms hare worsened, the Patient should contact 911 or have someone drive them to Emergency Dept promptly:      If Patient calling 911, inform 911 personal that you have tested positive for the Coronavirus (COVID-19).  Place mask on and await 911 to arrive.    If Emergency Dept, If possible, please have another adult drive you to the Emergency Dept but you need to wear mask when in contact with other people.      Review " information with Patient    Your result was positive. This means you have COVID-19 (coronavirus).  We have sent you a letter that reviews the information that I'll be reviewing with you now.    How can I protect others?    If you have symptoms: stay home and away from others (self-isolate) until:    You've had no fever--and no medicine that reduces fever--for 1 full day (24 hours). And       Your other symptoms have gotten better. For example, your cough or breathing has improved. And     At least 10 days have passed since your symptoms started. (If you've been told by a doctor that you have a weak immune system, wait 20 days.)     If you don't have symptoms: Stay home and away from others (self-isolate) until at least 10 days have passed since your first positive COVID-19 test. (Date test collected)    During this time:    Stay in your own room, including for meals. Use your own bathroom if you can.    Stay away from others in your home. No hugging, kissing or shaking hands. No visitors.     Don't go to work, school or anywhere else.     Clean  high touch  surfaces often (doorknobs, counters, handles, etc.). Use a household cleaning spray or wipes. You'll find a full list on the EPA website at www.epa.gov/pesticide-registration/list-n-disinfectants-use-against-sars-cov-2.     Cover your mouth and nose with a mask, tissue or other face covering to avoid spreading germs.    Wash your hands and face often with soap and water.    Caregivers in these groups are at risk for severe illness due to COVID-19:  o People 65 years and older  o People who live in a nursing home or long-term care facility  o People with chronic disease (lung, heart, cancer, diabetes, kidney, liver, immunologic)  o People who have a weakened immune system, including those who:  - Are in cancer treatment  - Take medicine that weakens the immune system, such as corticosteroids  - Had a bone marrow or organ transplant  - Have an immune  deficiency  - Have poorly controlled HIV or AIDS  - Are obese (body mass index of 40 or higher)  - Smoke regularly    Caregivers should wear gloves while washing dishes, handling laundry and cleaning bedrooms and bathrooms.    Wash and dry laundry with special caution. Don't shake dirty laundry, and use the warmest water setting you can.    If you have a weakened immune system, ask your doctor about other actions you should take.    For more tips, go to www.cdc.gov/coronavirus/2019-ncov/downloads/10Things.pdf.    You should not go back to work until you meet the guidelines above for ending your home isolation. You don't need to be retested for COVID-19 before going back to work--studies show that you won't spread the virus if it's been at least 10 days since your symptoms started (or 20 days, if you have a weak immune system).    Employers: This document serves as formal notice of your employee's medical guidelines for going back to work. They must meet the above guidelines before going back to work in person.    How can I take care of myself?    1. Get lots of rest. Drink extra fluids (unless a doctor has told you not to).    2. Take Tylenol (acetaminophen) for fever or pain. If you have liver or kidney problems, ask your family doctor if it's okay to take Tylenol.     Take either:     650 mg (two 325 mg pills) every 4 to 6 hours, or     1,000 mg (two 500 mg pills) every 8 hours as needed.     Note: Don't take more than 3,000 mg in one day. Acetaminophen is found in many medicines (both prescribed and over-the-counter medicines). Read all labels to be sure you don't take too much.    For children, check the Tylenol bottle for the right dose (based on their age or weight).    3. If you have other health problems (like cancer, heart failure, an organ transplant or severe kidney disease): Call your specialty clinic if you don't feel better in the next 2 days.    4. Know when to call 911: Emergency warning signs  include:    Trouble breathing or shortness of breath    Pain or pressure in the chest that doesn't go away    Feeling confused like you haven't felt before, or not being able to wake up    Bluish-colored lips or face    5. Sign up for [employer]. We know it's scary to hear that you have COVID-19. We want to track your symptoms to make sure you're okay over the next 2 weeks. Please look for an email from [employer]--this is a free, online program that we'll use to keep in touch. To sign up, follow the link in the email. Learn more at [email or web address].    Where can I get more information?    [facility]: [email or web address]    Coronavirus Basics: www.health.[facility].[place]/diseases/coronavirus/basics.html    What to Do If You're Sick: www.cdc.gov/coronavirus/2019-ncov/about/steps-when-sick.html    Ending Home Isolation: www.cdc.gov/coronavirus/2019-ncov/hcp/disposition-in-home-patients.html     Caring for Someone with COVID-19: www.cdc.gov/coronavirus/2019-ncov/if-you-are-sick/care-for-someone.html     [facility] clinical trials (COVID-19 research studies): clinicalaffairs.[facility]/n-clinical-trials     A Positive COVID-19 letter will be sent via [employer] or the mail. (Exception, no letters sent to Presurgerical/Preprocedure Patients)    Sharee Pérez RN/[facility]      Additional Information    Negative: [1] Caller is not with the adult (patient) AND [2] reporting urgent symptoms    Negative: Lab result questions    Negative: Medication questions    Negative: Caller can't be reached by phone    Negative: Caller has already spoken to PCP or another triager    Negative: RN needs further essential information from caller in order to complete triage    Negative: Requesting regular office appointment    Negative: [1] Caller requesting NON-URGENT health information AND [2] PCP's office is the best resource    Health Information question, no triage required  and triager able to answer question    Protocols used: INFORMATION ONLY CALL-A-AH

## 2020-10-27 ENCOUNTER — APPOINTMENT (OUTPATIENT)
Dept: GENERAL RADIOLOGY | Facility: CLINIC | Age: 25
End: 2020-10-27
Attending: EMERGENCY MEDICINE
Payer: MEDICARE

## 2020-10-27 ENCOUNTER — HOSPITAL ENCOUNTER (EMERGENCY)
Facility: CLINIC | Age: 25
Discharge: HOME | End: 2020-10-27
Attending: EMERGENCY MEDICINE | Admitting: EMERGENCY MEDICINE
Payer: MEDICARE

## 2020-10-27 VITALS
SYSTOLIC BLOOD PRESSURE: 150 MMHG | OXYGEN SATURATION: 98 % | HEART RATE: 108 BPM | WEIGHT: 230 LBS | HEIGHT: 66 IN | RESPIRATION RATE: 20 BRPM | DIASTOLIC BLOOD PRESSURE: 86 MMHG | BODY MASS INDEX: 36.96 KG/M2 | TEMPERATURE: 98.8 F

## 2020-10-27 DIAGNOSIS — R05.9 COUGH: ICD-10-CM

## 2020-10-27 DIAGNOSIS — U07.1 COVID-19: ICD-10-CM

## 2020-10-27 LAB
ANION GAP SERPL CALCULATED.3IONS-SCNC: 8 MMOL/L (ref 3–14)
BASOPHILS # BLD AUTO: 0 10E9/L (ref 0–0.2)
BASOPHILS NFR BLD AUTO: 0.7 %
BUN SERPL-MCNC: 10 MG/DL (ref 7–30)
CALCIUM SERPL-MCNC: 8.7 MG/DL (ref 8.5–10.1)
CHLORIDE SERPL-SCNC: 104 MMOL/L (ref 94–109)
CO2 SERPL-SCNC: 23 MMOL/L (ref 20–32)
CREAT SERPL-MCNC: 0.84 MG/DL (ref 0.66–1.25)
D DIMER PPP FEU-MCNC: <0.3 UG/ML FEU (ref 0–0.5)
DIFFERENTIAL METHOD BLD: NORMAL
EOSINOPHIL # BLD AUTO: 0.6 10E9/L (ref 0–0.7)
EOSINOPHIL NFR BLD AUTO: 9.7 %
ERYTHROCYTE [DISTWIDTH] IN BLOOD BY AUTOMATED COUNT: 12 % (ref 10–15)
GFR SERPL CREATININE-BSD FRML MDRD: >90 ML/MIN/{1.73_M2}
GLUCOSE SERPL-MCNC: 218 MG/DL (ref 70–99)
HCT VFR BLD AUTO: 41.6 % (ref 40–53)
HGB BLD-MCNC: 14.7 G/DL (ref 13.3–17.7)
IMM GRANULOCYTES # BLD: 0 10E9/L (ref 0–0.4)
IMM GRANULOCYTES NFR BLD: 0.5 %
INTERPRETATION ECG - MUSE: NORMAL
LACTATE BLD-SCNC: 1.7 MMOL/L (ref 0.7–2)
LACTATE BLD-SCNC: 2.4 MMOL/L (ref 0.7–2)
LYMPHOCYTES # BLD AUTO: 2.3 10E9/L (ref 0.8–5.3)
LYMPHOCYTES NFR BLD AUTO: 38.4 %
MCH RBC QN AUTO: 32.7 PG (ref 26.5–33)
MCHC RBC AUTO-ENTMCNC: 35.3 G/DL (ref 31.5–36.5)
MCV RBC AUTO: 93 FL (ref 78–100)
MONOCYTES # BLD AUTO: 0.6 10E9/L (ref 0–1.3)
MONOCYTES NFR BLD AUTO: 10.7 %
NEUTROPHILS # BLD AUTO: 2.4 10E9/L (ref 1.6–8.3)
NEUTROPHILS NFR BLD AUTO: 40 %
NRBC # BLD AUTO: 0 10*3/UL
NRBC BLD AUTO-RTO: 0 /100
PLATELET # BLD AUTO: 331 10E9/L (ref 150–450)
POTASSIUM SERPL-SCNC: 4.1 MMOL/L (ref 3.4–5.3)
RBC # BLD AUTO: 4.49 10E12/L (ref 4.4–5.9)
SODIUM SERPL-SCNC: 135 MMOL/L (ref 133–144)
TROPONIN I SERPL-MCNC: <0.015 UG/L (ref 0–0.04)
WBC # BLD AUTO: 6 10E9/L (ref 4–11)

## 2020-10-27 PROCEDURE — 87040 BLOOD CULTURE FOR BACTERIA: CPT | Performed by: EMERGENCY MEDICINE

## 2020-10-27 PROCEDURE — 99285 EMERGENCY DEPT VISIT HI MDM: CPT | Mod: 25

## 2020-10-27 PROCEDURE — 71045 X-RAY EXAM CHEST 1 VIEW: CPT

## 2020-10-27 PROCEDURE — 84484 ASSAY OF TROPONIN QUANT: CPT | Performed by: EMERGENCY MEDICINE

## 2020-10-27 PROCEDURE — 80048 BASIC METABOLIC PNL TOTAL CA: CPT | Performed by: EMERGENCY MEDICINE

## 2020-10-27 PROCEDURE — 250N000012 HC RX MED GY IP 250 OP 636 PS 637: Performed by: EMERGENCY MEDICINE

## 2020-10-27 PROCEDURE — 85379 FIBRIN DEGRADATION QUANT: CPT | Performed by: EMERGENCY MEDICINE

## 2020-10-27 PROCEDURE — 83605 ASSAY OF LACTIC ACID: CPT | Performed by: EMERGENCY MEDICINE

## 2020-10-27 PROCEDURE — 93005 ELECTROCARDIOGRAM TRACING: CPT

## 2020-10-27 PROCEDURE — 85025 COMPLETE CBC W/AUTO DIFF WBC: CPT | Performed by: EMERGENCY MEDICINE

## 2020-10-27 RX ORDER — BENZONATATE 200 MG/1
200 CAPSULE ORAL 3 TIMES DAILY PRN
Qty: 21 CAPSULE | Refills: 0 | Status: SHIPPED | OUTPATIENT
Start: 2020-10-27 | End: 2022-01-14

## 2020-10-27 RX ADMIN — DEXAMETHASONE 6 MG: 2 TABLET ORAL at 20:21

## 2020-10-27 ASSESSMENT — ENCOUNTER SYMPTOMS
VOMITING: 1
ABDOMINAL PAIN: 0
COUGH: 1
MYALGIAS: 1
DIARRHEA: 0
CHILLS: 1

## 2020-10-27 ASSESSMENT — MIFFLIN-ST. JEOR: SCORE: 1976.02

## 2020-10-27 NOTE — ED AVS SNAPSHOT
[facility] Emergency Dept  [address] [address]  Phone: [phone]  Fax: [phone]                                    Eric Fall   MRN: [id number]    Department: [facility] Emergency Dept   Date of Visit: 10/27/2020           After Visit Summary Signature Page    I have received my discharge instructions, and my questions have been answered. I have discussed any challenges I see with this plan with the nurse or doctor.    ..........................................................................................................................................  Patient/Patient Representative Signature      ..........................................................................................................................................  Patient Representative Print Name and Relationship to Patient    ..................................................               ................................................  Date                                   Time    ..........................................................................................................................................  Reviewed by Signature/Title    ...................................................              ..............................................  Date                                               Time          22EPIC Rev 08/18

## 2020-10-28 NOTE — ED NOTES
"RN called and spoke with staff at [facility] ([phone]) regarding patient's discharge and need to be picked up.  Staff member states, \"No, we can't keep him here, you need to admit him, that's why he came there.  He was short of breath and we can't care for him.\"  RN explained to staff member that there is no medical reason for the patient to be admitted to the hospital, that the patient can be quarantined to his room and have his food delivered to his room until the 14 days are up.  RN explained that the patient's oxygen saturations have been good while in the ED, that the MD prescribed something stronger for his cough than what he has been receiving at the [facility].  RN will discuss quarantine with the patient also.  Staff will come  the patient.    "

## 2020-10-28 NOTE — ED NOTES
Bed: ED25  Expected date: 10/27/20  Expected time: 7:39 PM  Means of arrival: Ambulance  Comments:  Stacy M2 25M sob +Covid

## 2020-10-28 NOTE — DISCHARGE INSTRUCTIONS
Discharge Instructions  COVID-19    COVID-19 is the disease caused by a new coronavirus. The virus spreads from person-to-person primarily by droplets when an infected person coughs or sneezes and the droplet either lands on another person or that other person touches a surface with the droplet on it. There are tests available to diagnose COVID-19. There is no specific treatment or medicine for the disease.    You may have been diagnosed with COVID, may be being tested for COVID and have a pending test result, or may have been exposed to COVID.    Symptoms of COVID-19  Many people have no symptoms or mild symptoms.  Symptoms may usually appear 4 to 5 days (up to 14 days) after contact with a person with COVID-19. Some people will get severe symptoms and pneumonia. Usual symptoms are:     ? Fever  ? Cough  ? Trouble breathing    Less common symptoms are: Headache, body aches, sore throat, sneezing, diarrhea,loss of taste or smell.    Isolation and Quarantine    You were seen because you have symptoms, had an exposure, or had some other concern about possible COVID. The best way to stop the spread of the virus is to avoid contact with others.  Isolation refers to sick people staying away from people who are not sick. A person in quarantine is limiting activity because they were exposed and are waiting to see if they might become sick.    If you test positive for COVID, you should stay home (isolation) for at least 10 days after your symptoms began, and for 24 hours with no fever and improvement of symptoms--whichever is longer. (Your fever should be gone for 24 hours without using fever-reducing medicine). If you have no symptoms, you should stay home (isolation) for 10 days from the day of the test.    For example, if you have a fever and cough for 6 days, you need to stay home 4 more days with no fever for a total of 10 days. Or, if you have a fever and cough for 10 days, you need to stay home one more day with no  fever for a total of 11 days.    If you have a high-risk exposure to COVID (you spent 15 minutes or more within six feet of somebody who has COVID), you should stay home (quarantine) for 14 days. Even if you test negative for COVID, the CDC recommends a 14-day quarantine from the time of your last exposure to that individual.    If you have symptoms but a negative test, you should stay at home until you are symptom-free and without fever for 24 hours, using the same judgment you would for when it is safe to return to work/school from strep throat, influenza, or the common cold. If you worsen, you should consider being re-evaluated.    If you are being tested for COVID and your test is pending, you should stay home until you know your test result.    How should I protect myself and others?    Do not go to work or school. Have a friend or relative do your shopping. Do not use public transportation (bus, train) or ridesharing (Lyft, Uber).    Separate yourself from other people in your home.?As much as possible, you should stay in one room and away from other people in your home. Also, use a separate bathroom, if possible. Avoid handling pets or other animals while sick.     Wear a facemask if you need to be around other people and cover your mouth and nose with a tissue when you cough or sneeze.     Avoid sharing personal household items. You should not share dishes, drinking glasses, forks/knives/spoons, towels, or bedding with other people in your home. After using these items, they should be washed with soap and water. Clean parts of your home that are touched often (doorknobs, faucets, countertops, etc.) daily.     Wash your hands often with soap and water for at least 20 seconds or use an alcohol-based hand  containing at least 60% alcohol.     Avoid touching your face.    Treat your symptoms. You can take Acetaminophen (Tylenol) to treat body aches and fever as needed for comfort. Ibuprofen (Advil or  Motrin) can be used as well if you still have symptoms after taking Tylenol. Drink fluids. Rest.    Watch for worsening symptoms such as shortness of breath/difficulty breathing or very severe weakness.    Employers/workplaces are being asked by the Centers for Disease Control (CDC) to not request notes/documentation for you to return to work or prove that you were ill. You may choose to show your employer this paperwork. Also, repeat testing should not be required to return to work.    Return to the Emergency Department if:    If you are developing worsening breathing, shortness of breath, or feel worse you should seek medical attention.  If you are uncertain, contact your health care provider/clinic. If you need emergency medical attention, call 911 and tell them you have been ill.      YOU NEED TO STAY IN ROOM ROOM, STAY AWAY FROM THE OTHER RESIDENTS OF THE GROUP HOME, EAT YOU MEALS IN YOUR ROOM FOR 9 MORE DAYS.  YOU CAN COME OUT OF YOUR ROOM ON November 5, 2020 AND BEGIN INTERACTING WITH OTHERS THEN.

## 2020-10-28 NOTE — ED PROVIDER NOTES
History     Chief Complaint:  Cough    HPI  Eric Fall is a 25 year old male with history of diabetes and recent COVID positive test who presents via EMS for evaluation of cough. The patient was brought to the ED via EMS from their group home for concerns of worsening cough since their COVID diagnosis and general worsening of their condition. He states along with his worsening cough since yesterday he has been vomiting secondary to his cough, experiencing chills, and worsening myalgias. He expresses that his main concern is his cough and that this is keeping him from sleeping well. He denies any abdominal pain, diarrhea, and any other known symptoms or concerns at this time.      PE/DVT Risk Factors:  The patient denies a personal or family history of PE, DVT, or other clotting disorders. The patient denies any recent travel, surgery, or other prolonged immobilization. The patient denies tobacco use or hormone use.    Allergies:  Haldol [Haloperidol]  Quetiapine  Risperdal  Zyprexa [Olanzapine]  Lithium  Peanut  Trazodone    Medications:    Abilify  Benadryl  Depakote  Gabapentin  Reglan  Imitrex  Thorazine  Ativan  Prozac  Atarax  Amlodipine  Metformin  Loxapine  Desmopressin  Insulin (Novolog)  Tenex    Past Medical History:    ADHD    Diabetes mellitus   Hip dysplasia, congenital   Mental retardation, moderate   Oppositional defiant disorder  PTSD    Seizures    TBI    Seizure  Suicidal ideation  Nocturnal Enuresis  Chronic tachycardia    Past Surgical History:    Bilateral hip surgery with pin placement    Family History:    No past pertinent family history.    Social History:  The patient presents to the ED alone.   Marital Status: Single  Tobacco Use: Never  Alcohol Use: No  Drug Use: No    Review of Systems   Constitutional: Positive for chills.   Respiratory: Positive for cough.    Gastrointestinal: Positive for vomiting. Negative for abdominal pain and diarrhea.   Musculoskeletal: Positive for  "myalgias.   All other systems reviewed and are negative.    Physical Exam     Patient Vitals for the past 24 hrs:   BP Temp Temp src Pulse Resp SpO2 Height Weight   10/27/20 1949 (!) 150/86 98.8  F (37.1  C) Oral 108 20 98 % 1.676 m (5' 6\") 104.3 kg (230 lb)       Physical Exam   Physical Exam   General:  Sitting on bed by self.   HENT:  No obvious trauma to head  Right Ear:  External ear normal.   Left Ear:  External ear normal.   Nose:  Nose normal.   Eyes:  Conjunctivae and EOM are normal. Pupils are equal, round, and reactive.   Neck: Normal range of motion. Neck supple. No tracheal deviation present.   CV:  Normal heart sounds. No murmur heard.  Pulm/Chest: Effort normal and breath sounds normal. No wheezing.  No coarse breath sounds.  Abd: Soft. No distension. There is no tenderness. There is no rigidity, no rebound and no guarding.   M/S: Normal range of motion. No calf pain or swelling  Neuro: Alert. GCS 15.  Skin: Skin is warm and dry. No rash noted. Not diaphoretic.   Psych: Normal mood and affect. Behavior is normal.       Emergency Department Course     ECG:  Indication: COVID  Completed at 2015.  Read at 2016.   Sinus tachycardia. Otherwise normal ECG.   Rate 111 bpm. VT interval 138. QRS duration 74. QT/QTc 334/454. P-R-T axes 58 37 45.    Imaging:  Radiology findings were communicated with the patient who voiced understanding of the findings.    XR Chest Port 1 View:  Single portable AP view of the chest was obtained. Stable   enlargement of the cardiac silhouette. No suspicious focal pulmonary   opacities. No significant pleural effusion or pneumothorax.  Report per radiology     Laboratory:  Laboratory findings were communicated with the patient who voiced understanding of the findings.    CBC: WBC 6.0, Hgb 14.7,     D-Dimer: <0.3    BMP: Glucose 218 (H), o/w WNL (Creat 0.84)    Troponin: <0.015    Lactic Acid(resulted 2013): 2.4 (H)     Blood Culture (x2):     Interventions:  2021 Decadron 6 " mg Oral    Emergency Department Course:  Past medical records, nursing notes, and vitals reviewed.    1958 I performed an exam of the patient as documented above.      EKG obtained in the ED, see results above.      IV was inserted and blood was drawn for laboratory testing, results above.    2041 I rechecked the patient and discussed the results of his workup thus far.      The patient was sent for a xr chest while in the emergency department, results above.     Findings and plan explained to the Patient. Patient discharged home with instructions regarding supportive care, medications, and reasons to return. The importance of close follow-up was reviewed.    I personally reviewed the laboratory and imaging results with the Patient and answered all related questions prior to discharge.     Impression & Plan   Medical Decision Making:  Eric Fall is a very pleasant 25 year old year old patient who presents to the emergency department with concern of a cough.  This is the patient's third visit to the emergency department recently.  On October 24 he underwent an excellent work-up by Dr. Abreu and the patient's Covid test obtained at that visit returned positive the following day.  The patient has COVID-19 disease.  He presents now mainly because of the cough keeping him up at night.  He describes post tussive emesis.  The patient has a benign abdominal exam.  Because it is third visit and he has Covid, I expanded the differential to assess for pulmonary embolism, myocarditis, secondary bacterial pneumonia, etc.  The patient would not cough until we were in the room and then he would start coughing and states that it was bothering him.  After initiating the work-up, I did provide him a one-time dose of Decadron.  He reported feeling significantly better and had minimal coughing after this.  He was not hypoxic on multiple checks.  D-dimer is negative; therefore, I doubt pulmonary embolism.  Screening EKG and  troponin are negative and I doubt myocarditis. The chest xray was reviewed and shows no evidence of pneumothorax, infiltrate to suggest pneumonia, widened mediastinum to suggest aortic dissection, obvious rib fracture or free air under the diaphragm to suggest perforated viscous ulcer.  I believe the patient is safe for discharge.  I wrote a prescription for Tessalon Perles.  Recommended continue quarantine until cleared per MD direction.  The patient expressed verbal understanding.  He is discharged back to his group home.  I had the nurse call his group home and reviewed the plan.  The patient reports he is his own decision maker and has no power of .    The treatment plan was discussed with the patient and they expressed understanding of this plan and consented to the plan.  In addition, the patient will return to the emergency department if their symptoms persist, worsen, if new symptoms arise or if there is any concern as other pathology may be present that is not evident at this time. They also understand the importance of close follow up in the clinic and if unable to do so will return to the emergency department for a reevaluation. All questions were answered.    Diagnosis:    ICD-10-CM    1. COVID-19  U07.1 Lactic acid whole blood   2. Cough  R05        Disposition:  Discharged to home.    Discharge Medications:  New Prescriptions    BENZONATATE (TESSALON) 200 MG CAPSULE    Take 1 capsule (200 mg) by mouth 3 times daily as needed for cough       Scribe Disclosure:  I, Karthik Abreu, am serving as a scribe at 7:55 PM on 10/27/2020 to document services personally performed by Carlos Erazo DO based on my observations and the provider's statements to me.      Carlos Erazo DO  10/27/20 2222

## 2020-10-28 NOTE — ED TRIAGE NOTES
Patient comes from a group home, has a history of TBI.  Was seen here yesterday for cough.  + covid test yesterday.  Now he is feeling worse.  Throwing up, cant keep meds down, hot flashes and cold flashes.  These symptoms started after his test results came back positive.  ABCs intact.

## 2020-11-03 LAB
BACTERIA SPEC CULT: NO GROWTH
SPECIMEN SOURCE: NORMAL

## 2021-07-27 ENCOUNTER — HOSPITAL ENCOUNTER (EMERGENCY)
Facility: CLINIC | Age: 26
Discharge: NURSING, REHAB OR LONG-TERM CARE | End: 2021-07-27
Attending: EMERGENCY MEDICINE | Admitting: EMERGENCY MEDICINE
Payer: MEDICARE

## 2021-07-27 VITALS
BODY MASS INDEX: 39.71 KG/M2 | DIASTOLIC BLOOD PRESSURE: 67 MMHG | HEART RATE: 82 BPM | RESPIRATION RATE: 18 BRPM | TEMPERATURE: 98.9 F | WEIGHT: 246 LBS | OXYGEN SATURATION: 98 % | SYSTOLIC BLOOD PRESSURE: 103 MMHG

## 2021-07-27 DIAGNOSIS — G43.809 OTHER MIGRAINE WITHOUT STATUS MIGRAINOSUS, NOT INTRACTABLE: ICD-10-CM

## 2021-07-27 PROCEDURE — 96375 TX/PRO/DX INJ NEW DRUG ADDON: CPT

## 2021-07-27 PROCEDURE — 96374 THER/PROPH/DIAG INJ IV PUSH: CPT

## 2021-07-27 PROCEDURE — 250N000011 HC RX IP 250 OP 636: Performed by: EMERGENCY MEDICINE

## 2021-07-27 PROCEDURE — 99284 EMERGENCY DEPT VISIT MOD MDM: CPT | Mod: 25

## 2021-07-27 RX ORDER — DIHYDROERGOTAMINE MESYLATE 1 MG/ML
1 INJECTION, SOLUTION INTRAMUSCULAR; INTRAVENOUS; SUBCUTANEOUS ONCE
Status: DISCONTINUED | OUTPATIENT
Start: 2021-07-27 | End: 2021-07-28 | Stop reason: HOSPADM

## 2021-07-27 RX ORDER — KETOROLAC TROMETHAMINE 15 MG/ML
15 INJECTION, SOLUTION INTRAMUSCULAR; INTRAVENOUS ONCE
Status: COMPLETED | OUTPATIENT
Start: 2021-07-27 | End: 2021-07-27

## 2021-07-27 RX ORDER — DIPHENHYDRAMINE HYDROCHLORIDE 50 MG/ML
50 INJECTION INTRAMUSCULAR; INTRAVENOUS ONCE
Status: COMPLETED | OUTPATIENT
Start: 2021-07-27 | End: 2021-07-27

## 2021-07-27 RX ADMIN — PROCHLORPERAZINE EDISYLATE 10 MG: 5 INJECTION INTRAMUSCULAR; INTRAVENOUS at 20:46

## 2021-07-27 RX ADMIN — DIPHENHYDRAMINE HYDROCHLORIDE 50 MG: 50 INJECTION INTRAMUSCULAR; INTRAVENOUS at 20:45

## 2021-07-27 RX ADMIN — KETOROLAC TROMETHAMINE 15 MG: 15 INJECTION, SOLUTION INTRAMUSCULAR; INTRAVENOUS at 20:49

## 2021-07-27 ASSESSMENT — ENCOUNTER SYMPTOMS
VOMITING: 0
HEADACHES: 1
PHOTOPHOBIA: 1
NAUSEA: 1

## 2021-07-28 NOTE — ED TRIAGE NOTES
Patient comes in via EMS from a .  Patient has history of migraines.  Headache began around 1700.  Complains of vision sensitivity and nausea as well.

## 2021-07-28 NOTE — ED NOTES
Bed: ED14  Expected date:   Expected time:   Means of arrival:   Comments:  [facility] - 597 - 26 M migraine eta 5447

## 2021-07-28 NOTE — ED PROVIDER NOTES
History   Chief Complaint:  Headache     HPI   Eric Fall is a 26 year old male with history of migraines, ADHD, hypertension, TBI and seizures who presents with a headache. Per report, at 1700 today, the patient woke up from a nap with a generalized severe headache. With EMS his blood sugar was 120. He notes intermittent tingling running up his legs from his knees. He has experienced some nausea but no emesis. He states this feels like migraines he has had in the past. Here, he is photophobic. Of note, he is fully COVID-19 vaccinated.     Review of Systems   Eyes: Positive for photophobia.   Gastrointestinal: Positive for nausea. Negative for vomiting.   Neurological: Positive for headaches.   All other systems reviewed and are negative.    Allergies:  Haldol   Quetiapine  Risperdal  Zyprexa  Dextroamphetamine-Amphetamine  Divalproex  Lithium  Peanut  Trazodone  Olanzapine    Medications:  Albuterol  Abilify  Tessalon  Benadryl  Depakote  Neurontin  Lactaid  Reglan  Imitrex    Past Medical History:    ADHD  Diabetes mellitus  Hip dysplasia, congential  MR, moderate  ODD  PTSD  Seizures  TBI  Anal fissure  Psychiatric pseudoseizures  Suicidal ideation  Aggression  Fetal alcohol syndrome  VUR  Onychomycosis  Nocturnal enuresis  Hypertension  Migraine    Past Surgical History:    Bilateral hip surgery with pin placement  Left hip surgery    Family History:    Father: Diabetes  Mother: Chemical dependency, Bipolar disorder    Social History:  The patient was accompanied to the ED by EMS.    Physical Exam     Patient Vitals for the past 24 hrs:   BP Temp Temp src Pulse Resp SpO2 Weight   07/27/21 2200 -- -- -- -- -- 98 % --   07/27/21 2130 103/67 -- -- 82 -- -- --   07/27/21 2100 122/74 -- -- 66 -- 99 % --   07/27/21 2041 116/70 98.9  F (37.2  C) Oral 80 18 95 % 111.6 kg (246 lb)       Physical Exam  Nursing note and vitals reviewed.    Constitutional:  Stuttering speech. Appears comfortable.    HENT:     Nose normal.  No discharge.      Oral mucosa is moist.  Eyes:    Pupils are equal. Photophobic. Conjunctivae are normal without injection.  Cardiovascular:  Normal rate, regular rhythm with normal S1 and S2.      Normal heart sounds and peripheral pulses 2+ and equal.       No murmur or valerie.  Pulmonary:  Effort normal and breath sounds clear to auscultation bilaterally.   GI:    Soft. No distension and no mass. No tenderness.   Musculoskeletal:  Neck supple. Normal range of motion. No extremity deformity.     No edema and no tenderness.    Neurological:   Alert. No focal weakness.     Exhibits good muscle tone. Coordination normal.      GCS eye subscore is 4. GCS verbal subscore is 5.      GCS motor subscore is 6.   Skin:    Skin is warm and dry. No rash noted. No diaphoresis.      No erythema. No pallor.  No lesions.    Emergency Department Course   Emergency Department Course:  Reviewed:  I reviewed nursing notes, vitals, past medical history and care everywhere    Assessments:  2029 I obtained history and examined the patient as noted above.      I rechecked and updated the patient regarding the plan for care.    Interventions:  2045 Benadryl 50mg IV  2046 Compazine 10mg IV  2049 Toradol 15mg IV    Disposition:  The patient was discharged to home.     Impression & Plan   Medical Decision Making:  Patient comes in from the group home with symptoms consistent with his migraines.  He is photophobic.  No recent fevers, no other symptoms no trauma.  I was not worried about a more serious cause for his headache.  He was given 50 mg of IV Benadryl, 10 mg of IV Compazine, and 15 mg of Toradol IV.  After about an hour his headache was about gone.  He will go back to the group home at this point.  I recommended follow-up in the clinic if his headache frequency is increasing.      Home, rest, follow-up with your doctor in the clinic if headache frequency is increasing.    Diagnosis:    ICD-10-CM    1. Other migraine  without status migrainosus, not intractable  G43.809        Scribe Disclosure:  I, Miko Shen, am serving as a scribe at 8:33 PM on 7/27/2021 to document services personally performed by Dilia Monzon MD based on my observations and the provider's statements to me.      Dilia Monzon MD  07/27/21 1995

## 2021-09-07 ENCOUNTER — APPOINTMENT (OUTPATIENT)
Dept: CT IMAGING | Facility: CLINIC | Age: 26
End: 2021-09-07
Attending: EMERGENCY MEDICINE
Payer: MEDICARE

## 2021-09-07 ENCOUNTER — HOSPITAL ENCOUNTER (EMERGENCY)
Facility: CLINIC | Age: 26
Discharge: HOME | End: 2021-09-07
Attending: EMERGENCY MEDICINE | Admitting: EMERGENCY MEDICINE
Payer: MEDICARE

## 2021-09-07 VITALS
HEART RATE: 89 BPM | SYSTOLIC BLOOD PRESSURE: 111 MMHG | DIASTOLIC BLOOD PRESSURE: 84 MMHG | TEMPERATURE: 98.2 F | RESPIRATION RATE: 18 BRPM | OXYGEN SATURATION: 98 %

## 2021-09-07 DIAGNOSIS — R10.13 ABDOMINAL PAIN, EPIGASTRIC: ICD-10-CM

## 2021-09-07 DIAGNOSIS — D49.89 ABDOMINAL TUMOR: ICD-10-CM

## 2021-09-07 LAB
ALBUMIN SERPL-MCNC: 4.1 G/DL (ref 3.4–5)
ALP SERPL-CCNC: 83 U/L (ref 40–150)
ALT SERPL W P-5'-P-CCNC: 29 U/L (ref 0–70)
ANION GAP SERPL CALCULATED.3IONS-SCNC: 9 MMOL/L (ref 3–14)
AST SERPL W P-5'-P-CCNC: 19 U/L (ref 0–45)
BASOPHILS # BLD AUTO: 0.1 10E3/UL (ref 0–0.2)
BASOPHILS NFR BLD AUTO: 1 %
BILIRUB SERPL-MCNC: 0.8 MG/DL (ref 0.2–1.3)
BUN SERPL-MCNC: 12 MG/DL (ref 7–30)
CALCIUM SERPL-MCNC: 9.5 MG/DL (ref 8.5–10.1)
CHLORIDE BLD-SCNC: 110 MMOL/L (ref 94–109)
CO2 SERPL-SCNC: 20 MMOL/L (ref 20–32)
CREAT SERPL-MCNC: 0.96 MG/DL (ref 0.66–1.25)
EOSINOPHIL # BLD AUTO: 0.3 10E3/UL (ref 0–0.7)
EOSINOPHIL NFR BLD AUTO: 4 %
ERYTHROCYTE [DISTWIDTH] IN BLOOD BY AUTOMATED COUNT: 12 % (ref 10–15)
GFR SERPL CREATININE-BSD FRML MDRD: >90 ML/MIN/1.73M2
GLUCOSE BLD-MCNC: 118 MG/DL (ref 70–99)
HCT VFR BLD AUTO: 40.7 % (ref 40–53)
HGB BLD-MCNC: 13.7 G/DL (ref 13.3–17.7)
IMM GRANULOCYTES # BLD: 0 10E3/UL
IMM GRANULOCYTES NFR BLD: 0 %
INR PPP: 1.07 (ref 0.85–1.15)
LIPASE SERPL-CCNC: 189 U/L (ref 73–393)
LYMPHOCYTES # BLD AUTO: 1.7 10E3/UL (ref 0.8–5.3)
LYMPHOCYTES NFR BLD AUTO: 22 %
MCH RBC QN AUTO: 32.5 PG (ref 26.5–33)
MCHC RBC AUTO-ENTMCNC: 33.7 G/DL (ref 31.5–36.5)
MCV RBC AUTO: 97 FL (ref 78–100)
MONOCYTES # BLD AUTO: 0.4 10E3/UL (ref 0–1.3)
MONOCYTES NFR BLD AUTO: 5 %
NEUTROPHILS # BLD AUTO: 5.1 10E3/UL (ref 1.6–8.3)
NEUTROPHILS NFR BLD AUTO: 68 %
NRBC # BLD AUTO: 0 10E3/UL
NRBC BLD AUTO-RTO: 0 /100
PLATELET # BLD AUTO: 302 10E3/UL (ref 150–450)
POTASSIUM BLD-SCNC: 3.7 MMOL/L (ref 3.4–5.3)
PROT SERPL-MCNC: 7.7 G/DL (ref 6.8–8.8)
RBC # BLD AUTO: 4.21 10E6/UL (ref 4.4–5.9)
SODIUM SERPL-SCNC: 139 MMOL/L (ref 133–144)
WBC # BLD AUTO: 7.5 10E3/UL (ref 4–11)

## 2021-09-07 PROCEDURE — 250N000009 HC RX 250: Performed by: EMERGENCY MEDICINE

## 2021-09-07 PROCEDURE — 258N000003 HC RX IP 258 OP 636: Performed by: EMERGENCY MEDICINE

## 2021-09-07 PROCEDURE — 80053 COMPREHEN METABOLIC PANEL: CPT | Performed by: EMERGENCY MEDICINE

## 2021-09-07 PROCEDURE — 36415 COLL VENOUS BLD VENIPUNCTURE: CPT | Performed by: EMERGENCY MEDICINE

## 2021-09-07 PROCEDURE — 250N000011 HC RX IP 250 OP 636: Performed by: EMERGENCY MEDICINE

## 2021-09-07 PROCEDURE — 96361 HYDRATE IV INFUSION ADD-ON: CPT

## 2021-09-07 PROCEDURE — 74177 CT ABD & PELVIS W/CONTRAST: CPT | Mod: ME

## 2021-09-07 PROCEDURE — 85025 COMPLETE CBC W/AUTO DIFF WBC: CPT | Performed by: EMERGENCY MEDICINE

## 2021-09-07 PROCEDURE — 250N000013 HC RX MED GY IP 250 OP 250 PS 637: Performed by: EMERGENCY MEDICINE

## 2021-09-07 PROCEDURE — 83690 ASSAY OF LIPASE: CPT | Performed by: EMERGENCY MEDICINE

## 2021-09-07 PROCEDURE — 85610 PROTHROMBIN TIME: CPT | Performed by: EMERGENCY MEDICINE

## 2021-09-07 PROCEDURE — 99285 EMERGENCY DEPT VISIT HI MDM: CPT | Mod: 25

## 2021-09-07 PROCEDURE — 96374 THER/PROPH/DIAG INJ IV PUSH: CPT | Mod: 59

## 2021-09-07 RX ORDER — ONDANSETRON 2 MG/ML
4 INJECTION INTRAMUSCULAR; INTRAVENOUS EVERY 30 MIN PRN
Status: DISCONTINUED | OUTPATIENT
Start: 2021-09-07 | End: 2021-09-07 | Stop reason: HOSPADM

## 2021-09-07 RX ORDER — IOPAMIDOL 755 MG/ML
500 INJECTION, SOLUTION INTRAVASCULAR ONCE
Status: COMPLETED | OUTPATIENT
Start: 2021-09-07 | End: 2021-09-07

## 2021-09-07 RX ORDER — SODIUM CHLORIDE 9 MG/ML
INJECTION, SOLUTION INTRAVENOUS CONTINUOUS
Status: DISCONTINUED | OUTPATIENT
Start: 2021-09-07 | End: 2021-09-07 | Stop reason: HOSPADM

## 2021-09-07 RX ADMIN — ONDANSETRON 4 MG: 2 INJECTION INTRAMUSCULAR; INTRAVENOUS at 11:29

## 2021-09-07 RX ADMIN — SODIUM CHLORIDE 1000 ML: 9 INJECTION, SOLUTION INTRAVENOUS at 11:29

## 2021-09-07 RX ADMIN — LIDOCAINE HYDROCHLORIDE 30 ML: 20 SOLUTION ORAL; TOPICAL at 11:30

## 2021-09-07 RX ADMIN — SODIUM CHLORIDE 65 ML: 9 INJECTION, SOLUTION INTRAVENOUS at 11:54

## 2021-09-07 RX ADMIN — IOPAMIDOL 100 ML: 755 INJECTION, SOLUTION INTRAVENOUS at 11:54

## 2021-09-07 ASSESSMENT — ENCOUNTER SYMPTOMS
ABDOMINAL PAIN: 1
COUGH: 1
VOMITING: 1

## 2021-09-07 NOTE — ED NOTES
Gastrointestinal - Gastrointestinal Comment: PT COMES IN WITH NAUSEA AND VOMITING UP BLOOD FOR LAST 2 WEEKS. PT WAS SEEN AT [facility] LAST FRIDAY.

## 2021-09-07 NOTE — ED PROVIDER NOTES
History   Chief Complaint:  Hemoptysis and Abdominal Pain     HPI   Eric Fall is a 26 year old male with history of diabetes mellitus, seizures, and fetal alcohol syndrome who presents with abdominal pain and vomiting up blood. The patient reports that for the past 2 weeks he has been having upper abdominal pain. He states that he was evaluated for this at [facility] on 08/27/2021 and was diagnosed with a mesenteric mass. The patient also notes that he has been vomiting up blood intermittently for the past 2 weeks. He did follow up with his primary clinic and was told to present to the emergency department if his pain worsened, otherwise he was to follow up with GI on Oct. 22nd. Today he notes that his pain worsened at around 0400 this morning and had 2 episodes of vomiting up blood clots this morning, prompting his visit. He is also noted to be coughing in the emergency department.     [facility] Emergency Department   CT Chest Abdomen Pelvis W Contrast  1. 3.2 cm nodule identified in the left mesentery adjacent to the small bowel loops and mesenteric vessels. Small adjacent satellite sub cm mesenteric lymph nodes are noted. Findings concerning for a enlarged lymph node or mesenteric tumor, neuroendocrine GIST tumor are in the differential. This appears solid. Less likely would be an unusual small bowel diverticulum or vascular anomaly. Recommend PET-CT for further assessment.   2. No evidence for bowel obstruction. No free fluid noted.   3. Essentially negative CT scan of the chest with some mild ground-glass density most compatible with atelectasis. No suspicious lymphadenopathy or masses are seen.       Review of Systems   Respiratory: Positive for cough.    Gastrointestinal: Positive for abdominal pain and vomiting (with blood).   All other systems reviewed and are negative.      Allergies:  Lithium  Amphetamine-Dextroamphetamine  Dextroamphetamine  Trazodone  Haldol  [Haloperidol]  Peanut (Diagnostic)  Quetiapine  Risperdal  Valproic Acid  Zyprexa [Olanzapine]    Medications:  Albuterol  Abilify  Tessalon  Benadryl  Depakote  Neurontin  Lactaid  Reglan  Imitrex    Past Medical History:    ADHD  Diabetes mellitus  Hip dysplasia  Mental retardation  Oppositional defiant disorder  PTSD  Seizures  TBI  Suicidal ideation  Schizoaffective disorder  Fetal alcohol syndrome  Mesenteric mass  COVID-19  Pes planus    Past Surgical History:    Bilateral hip surgery  Left hip surgery   Circumcision    Family History:    The patient denies past family history.     Social History:  The patient presents alone.  He currently lives in a group home.     Physical Exam     Patient Vitals for the past 24 hrs:   BP Temp Temp src Pulse Resp SpO2   09/07/21 1058 119/73 98.2  F (36.8  C) Oral 78 18 100 %       Physical Exam  Constitutional: Patient is well appearing. No distress.  Head: Atraumatic.  Mouth/Throat: Oropharynx is clear and moist. No oropharyngeal exudate.  Eyes: Conjunctivae and EOM are normal. No scleral icterus.  Neck: Normal range of motion. Neck supple.   Cardiovascular: Normal rate, regular rhythm, normal heart sounds and intact distal pulses.   Pulmonary/Chest: Breath sounds normal. No respiratory distress.  Abdominal: Soft. Bowel sounds are normal. No distension. No tenderness. No rebound or guarding.   Musculoskeletal: Normal range of motion. No edema or tenderness.   Neurological: Alert and orientated to person, place, and time. No observable focal neuro deficit  Skin: Warm and dry. No rash noted. Not diaphoretic.      Emergency Department Course     Imaging:  CT Abdomen Pelvis W Contrast  1. No CT findings to explain patient's symptoms of abdominal pain.  2. 3.3 cm soft tissue nodule in the upper abdomen, indeterminate,  could represent gastrointestinal stromal tumor, metastatic lymph node  or a small bowel diverticulum, among other differential  considerations.  Reading per  radiology.    Laboratory:  CBC: WBC 7.5, HGB 13.7,    BMP: Chloride 110 (H), Glucose 118 (H) o/w WNL (Creatinine 0.96)     INR: 1.07  Lipase: 189     Emergency Department Course:    Reviewed:  I reviewed nursing notes, vitals, past medical history and care everywhere    Assessments:  1051 I obtained history and examined the patient as noted above.   1307 I rechecked the patient and explained findings.     Consults:  1330 I spoke with LAQUITA Jimenez regarding the patient.     Interventions:  1129 NS, 1 L, IV bolus   1129 Zofran 4 mg IV   1130 GI Cocktail 30 mL Oral     Disposition:  The patient was discharged to home.       Impression & Plan     Medical Decision Making:  Eric Fall is a 26 year old male who presents with epigastric pain. At this point, the exact etiology is unknown, however I feel we have adequately assessed for acute and dangerous pathology. We did check LFT's and lipase, all of which were negative. He does not have right upper quadrant tenderness, jaundice, or fevers to increase my concern for cholelithiasis or gallbladder infection. CT scan was obtained and did reveal a 3.3 cm soft tissue nodule, but unchanged from known tumor and no cause for the patient's pain today. He does not have a history of any intraabdominal surgeries and has normal bowel sounds, no rebound/guarding/peritoneal signs, and nl bs to increase my concern for a bowel obstruction.  Otherwise, although obviously uncomfortable, he is well appearing. I suspect this pain is ultimately secondary to gastritis versus peptic ulcer disease. Here he did have temporary relief with the GI cocktail, Prilosec, and other pain medications. We discussed options for treating this and ultimately have decided to discharge the patient home. All questions and concerns were answered. The patient was discharged home and recommended to follow up with his primary physician and return with any new or worsening symptoms.       Diagnosis:    ICD-10-CM    1. Abdominal pain, epigastric  R10.13    2. Abdominal tumor  D49.89        Discharge Medications:  None     Scribe Disclosure:  I, Ifeanyi Jones, am serving as a scribe at 10:58 AM on 9/7/2021 to document services personally performed by Hugh Diaz MD based on my observations and the provider's statements to me.              Hugh Diaz MD  09/07/21 [id number]

## 2021-09-07 NOTE — ED NOTES
GAVE REPORT TO LUCY SAEZ AT GROUP HOME     NAYE DAVIS IS  CAN BE CONTACTED [phone]   IS ON THE WAY TO  PT

## 2021-09-07 NOTE — ED TRIAGE NOTES
Presents to ED from group home for a few weeks of abdominal pain and hemoptysis. Per EMS report, staff reported he was seen awhile ago for a GI workup (apparently found a tumor?). Pt states at 0400 it got worse, and when he went to eat  Breakfast, he threw it up right away. Vitals stable.

## 2021-09-07 NOTE — ED NOTES
Bed: ED19  Expected date: 9/7/21  Expected time:   Means of arrival: Ambulance  Comments:  [facility]

## 2021-11-14 ENCOUNTER — APPOINTMENT (OUTPATIENT)
Dept: CT IMAGING | Facility: CLINIC | Age: 26
End: 2021-11-14
Attending: EMERGENCY MEDICINE
Payer: MEDICARE

## 2021-11-14 ENCOUNTER — HOSPITAL ENCOUNTER (EMERGENCY)
Facility: CLINIC | Age: 26
Discharge: HOME | End: 2021-11-14
Attending: EMERGENCY MEDICINE | Admitting: EMERGENCY MEDICINE
Payer: MEDICARE

## 2021-11-14 VITALS
DIASTOLIC BLOOD PRESSURE: 65 MMHG | TEMPERATURE: 97.5 F | OXYGEN SATURATION: 97 % | SYSTOLIC BLOOD PRESSURE: 121 MMHG | RESPIRATION RATE: 21 BRPM | HEART RATE: 89 BPM

## 2021-11-14 DIAGNOSIS — R07.9 CHEST PAIN, UNSPECIFIED TYPE: ICD-10-CM

## 2021-11-14 LAB
ANION GAP SERPL CALCULATED.3IONS-SCNC: 7 MMOL/L (ref 3–14)
BASOPHILS # BLD AUTO: 0.1 10E3/UL (ref 0–0.2)
BASOPHILS NFR BLD AUTO: 1 %
BUN SERPL-MCNC: 8 MG/DL (ref 7–30)
CALCIUM SERPL-MCNC: 8.5 MG/DL (ref 8.5–10.1)
CHLORIDE BLD-SCNC: 109 MMOL/L (ref 94–109)
CO2 SERPL-SCNC: 23 MMOL/L (ref 20–32)
CREAT SERPL-MCNC: 0.8 MG/DL (ref 0.66–1.25)
EOSINOPHIL # BLD AUTO: 0.5 10E3/UL (ref 0–0.7)
EOSINOPHIL NFR BLD AUTO: 8 %
ERYTHROCYTE [DISTWIDTH] IN BLOOD BY AUTOMATED COUNT: 11.5 % (ref 10–15)
GFR SERPL CREATININE-BSD FRML MDRD: >90 ML/MIN/1.73M2
GLUCOSE BLD-MCNC: 161 MG/DL (ref 70–99)
HCT VFR BLD AUTO: 41.3 % (ref 40–53)
HGB BLD-MCNC: 14.1 G/DL (ref 13.3–17.7)
IMM GRANULOCYTES # BLD: 0 10E3/UL
IMM GRANULOCYTES NFR BLD: 0 %
LYMPHOCYTES # BLD AUTO: 2.5 10E3/UL (ref 0.8–5.3)
LYMPHOCYTES NFR BLD AUTO: 44 %
MCH RBC QN AUTO: 33 PG (ref 26.5–33)
MCHC RBC AUTO-ENTMCNC: 34.1 G/DL (ref 31.5–36.5)
MCV RBC AUTO: 97 FL (ref 78–100)
MONOCYTES # BLD AUTO: 0.4 10E3/UL (ref 0–1.3)
MONOCYTES NFR BLD AUTO: 8 %
NEUTROPHILS # BLD AUTO: 2.3 10E3/UL (ref 1.6–8.3)
NEUTROPHILS NFR BLD AUTO: 39 %
NRBC # BLD AUTO: 0 10E3/UL
NRBC BLD AUTO-RTO: 0 /100
PLATELET # BLD AUTO: 303 10E3/UL (ref 150–450)
POTASSIUM BLD-SCNC: 4 MMOL/L (ref 3.4–5.3)
RBC # BLD AUTO: 4.27 10E6/UL (ref 4.4–5.9)
SODIUM SERPL-SCNC: 139 MMOL/L (ref 133–144)
TROPONIN I SERPL-MCNC: <0.015 UG/L (ref 0–0.04)
WBC # BLD AUTO: 5.8 10E3/UL (ref 4–11)

## 2021-11-14 PROCEDURE — 80048 BASIC METABOLIC PNL TOTAL CA: CPT | Performed by: EMERGENCY MEDICINE

## 2021-11-14 PROCEDURE — 99285 EMERGENCY DEPT VISIT HI MDM: CPT | Mod: 25

## 2021-11-14 PROCEDURE — 36415 COLL VENOUS BLD VENIPUNCTURE: CPT | Performed by: EMERGENCY MEDICINE

## 2021-11-14 PROCEDURE — 71275 CT ANGIOGRAPHY CHEST: CPT | Mod: MG

## 2021-11-14 PROCEDURE — 250N000011 HC RX IP 250 OP 636: Performed by: EMERGENCY MEDICINE

## 2021-11-14 PROCEDURE — 250N000009 HC RX 250: Performed by: EMERGENCY MEDICINE

## 2021-11-14 PROCEDURE — 93005 ELECTROCARDIOGRAM TRACING: CPT

## 2021-11-14 PROCEDURE — 85025 COMPLETE CBC W/AUTO DIFF WBC: CPT | Performed by: EMERGENCY MEDICINE

## 2021-11-14 PROCEDURE — 84484 ASSAY OF TROPONIN QUANT: CPT | Performed by: EMERGENCY MEDICINE

## 2021-11-14 RX ORDER — IOPAMIDOL 755 MG/ML
500 INJECTION, SOLUTION INTRAVASCULAR ONCE
Status: COMPLETED | OUTPATIENT
Start: 2021-11-14 | End: 2021-11-14

## 2021-11-14 RX ADMIN — SODIUM CHLORIDE 90 ML: 9 INJECTION, SOLUTION INTRAVENOUS at 22:32

## 2021-11-14 RX ADMIN — IOPAMIDOL 73 ML: 755 INJECTION, SOLUTION INTRAVENOUS at 22:28

## 2021-11-15 LAB
ATRIAL RATE - MUSE: 96 BPM
DIASTOLIC BLOOD PRESSURE - MUSE: NORMAL MMHG
INTERPRETATION ECG - MUSE: NORMAL
P AXIS - MUSE: 52 DEGREES
PR INTERVAL - MUSE: 158 MS
QRS DURATION - MUSE: 80 MS
QT - MUSE: 338 MS
QTC - MUSE: 427 MS
R AXIS - MUSE: 37 DEGREES
SYSTOLIC BLOOD PRESSURE - MUSE: NORMAL MMHG
T AXIS - MUSE: 35 DEGREES
VENTRICULAR RATE- MUSE: 96 BPM

## 2021-11-15 NOTE — ED NOTES
Bed: ED24  Expected date:   Expected time:   Means of arrival:   Comments:  U386-10T Chest discomfort

## 2021-11-15 NOTE — ED TRIAGE NOTES
At around 4 pm today patient started having chest pain/pressure located at center of chest. Does not radiate.

## 2021-11-15 NOTE — ED PROVIDER NOTES
History     Chief Complaint:    Chest Pain      HPI   Eric Fall is a 26 year old male with history of TBI among others as well as recent diagnosis of possible mesenteric mass versus abnormal lymph node who presents for evaluation of chest pain.  He notes that he had 3 cigarettes and 2 cans of Sprite prior to onset of symptoms, which have been constant since.  The symptoms include chest tightness alone, without radiation to the back or abdomen, and without dyspnea, cough, fever, other concerns.  He is uncertain if he has had these symptoms before.  After several hours, he requests ER evaluation is transported here for this.    Review of Systems  CV: + as per above  Resp: + as per above  All other systems reviewed and negative      Allergies:  Lithium  Amphetamine-Dextroamphetamine  Dextroamphetamine  Trazodone  Haldol [Haloperidol]  Peanut (Diagnostic)  Quetiapine  Risperdal  Valproic Acid  Zyprexa [Olanzapine]      Medications:    albuterol (PROAIR HFA, PROVENTIL HFA, VENTOLIN HFA) 108 (90 BASE) MCG/ACT inhaler  ARIPiprazole (ABILIFY) 5 MG tablet  benzonatate (TESSALON) 200 MG capsule  diphenhydrAMINE (BENADRYL) 25 MG tablet  divalproex (DEPAKOTE) 500 MG EC tablet  gabapentin (NEURONTIN) 300 MG capsule  lactase (LACTAID) 9000 UNITS TABS  metoclopramide (REGLAN) 10 MG tablet  SUMAtriptan (IMITREX) 5 MG/ACT nasal spray        Past Medical History:    Past Medical History:   Diagnosis Date     ADHD (attention deficit hyperactivity disorder)      Diabetes mellitus (H)      Hip dysplasia, congenital      MR (mental retardation), moderate      ODD (oppositional defiant disorder)      PTSD (post-traumatic stress disorder)      Seizures (H)      TBI (traumatic brain injury) (H)         Past Surgical History:    Past Surgical History:   Procedure Laterality Date     ORTHOPEDIC SURGERY  2010    Bilateral hip surgery with pin placement, [facility]     ORTHOPEDIC SURGERY  3/2015    L hip       Family History:     Reviewed, not pertinent    Social History:  Presents alone    Physical Exam     Patient Vitals for the past 24 hrs:   BP Temp Temp src Pulse Resp SpO2   11/14/21 2215 132/73 -- -- 87 21 98 %   11/14/21 2145 123/72 -- -- 100 20 100 %   11/14/21 2138 -- 97.5  F (36.4  C) Oral 97 18 99 %       Physical Exam  Constitutional: Alert, attentive  HENT:    Nose: Nose normal.    Mouth/Throat: Oropharynx is clear, mucous membranes are moist   Eyes: EOM are normal.   CV: regular rate and rhythm; no murmurs, rubs or gallups  Chest: Effort normal and breath sounds normal.   GI:  There is no tenderness. No distension. Normal bowel sounds  MSK: Normal range of motion.   Neurological: Alert, attentive  Skin: Skin is warm and dry.      Emergency Department Course   ECG:  NSR, no ST/T abnormality, no STEMI, no significant change from October 27, 2020    Imaging:  CT Chest Pulmonary Embolism w Contrast   Final Result   IMPRESSION:   1.  No pulmonary embolus, aortic aneurysm or dissection. Lungs clear.   2.  Trace pericardial effusion.          Laboratory:  Labs Ordered and Resulted from Time of ED Arrival to Time of ED Departure   BASIC METABOLIC PANEL - Abnormal       Result Value    Sodium 139      Potassium 4.0      Chloride 109      Carbon Dioxide (CO2) 23      Anion Gap 7      Urea Nitrogen 8      Creatinine 0.80      Calcium 8.5      Glucose 161 (*)     GFR Estimate >90     CBC WITH PLATELETS AND DIFFERENTIAL - Abnormal    WBC Count 5.8      RBC Count 4.27 (*)     Hemoglobin 14.1      Hematocrit 41.3      MCV 97      MCH 33.0      MCHC 34.1      RDW 11.5      Platelet Count 303      % Neutrophils 39      % Lymphocytes 44      % Monocytes 8      % Eosinophils 8      % Basophils 1      % Immature Granulocytes 0      NRBCs per 100 WBC 0      Absolute Neutrophils 2.3      Absolute Lymphocytes 2.5      Absolute Monocytes 0.4      Absolute Eosinophils 0.5      Absolute Basophils 0.1      Absolute Immature Granulocytes 0.0       Absolute NRBCs 0.0     TROPONIN I - Normal    Troponin I <0.015             Emergency Department Course:    Reviewed:    I reviewed nursing notes, vitals and past history    Assessments:   I obtained history and examined the patient as noted above.    I rechecked the patient and explained findings.     Interventions:  Medications   CT Flush 100mL Saline (90 mLs Intravenous Given 11/14/21 2232)   iopamidol (ISOVUE-370) solution 500 mL (73 mLs Intravenous Given 11/14/21 2228)       Disposition:  The patient was discharged to home.    Impression & Plan        Medical Decision Making:  This is a 26-year-old male with history of TBI presents for evaluation of atypical chest pain.  Symptoms have been ongoing for the last 6 hours preceding arrival.  Given atypical symptoms and 6 hours of duration, negative EKG and troponin syndrome with AMI.  Given possible underlying malignancy--possible mesenteric mass was recently diagnosed--CT scan was performed and fortunately shows no acute abnormality.  On recheck the patient notes his pain is improved without intervention.  Plan primary care follow-up for recheck in 3 to 5 days return precautions for shortness of breath, chest pain, or any other concerns.    Diagnosis:    ICD-10-CM    1. Chest pain, unspecified type  R07.9         Dinesh Wei MD  11/15/21 0131

## 2022-01-14 ENCOUNTER — HOSPITAL ENCOUNTER (EMERGENCY)
Facility: CLINIC | Age: 27
Discharge: PSYCHIATRIC OR SUBSTANCE USE FACILITY | End: 2022-01-19
Attending: EMERGENCY MEDICINE | Admitting: EMERGENCY MEDICINE
Payer: MEDICARE

## 2022-01-14 DIAGNOSIS — F43.20 EMOTIONAL CRISIS: ICD-10-CM

## 2022-01-14 DIAGNOSIS — R45.1 AGITATION: ICD-10-CM

## 2022-01-14 DIAGNOSIS — R45.6 VIOLENT BEHAVIOR: ICD-10-CM

## 2022-01-14 PROCEDURE — 96374 THER/PROPH/DIAG INJ IV PUSH: CPT

## 2022-01-14 PROCEDURE — C9803 HOPD COVID-19 SPEC COLLECT: HCPCS

## 2022-01-14 PROCEDURE — 99285 EMERGENCY DEPT VISIT HI MDM: CPT | Mod: 25

## 2022-01-14 PROCEDURE — 90791 PSYCH DIAGNOSTIC EVALUATION: CPT

## 2022-01-14 RX ORDER — HYDROXYZINE HYDROCHLORIDE 25 MG/1
50 TABLET, FILM COATED ORAL
Status: DISCONTINUED | OUTPATIENT
Start: 2022-01-14 | End: 2022-01-17

## 2022-01-14 RX ORDER — GUANFACINE 2 MG/1
2 TABLET ORAL 2 TIMES DAILY
Status: ON HOLD | COMMUNITY
Start: 2021-12-24 | End: 2022-01-21

## 2022-01-14 RX ORDER — CHLORPROMAZINE HYDROCHLORIDE 200 MG/1
200 TABLET, FILM COATED ORAL 2 TIMES DAILY
Status: ON HOLD | COMMUNITY
Start: 2021-12-24 | End: 2022-01-21

## 2022-01-14 RX ORDER — PANTOPRAZOLE SODIUM 40 MG/1
40 TABLET, DELAYED RELEASE ORAL
COMMUNITY
Start: 2021-12-24

## 2022-01-14 RX ORDER — LURASIDONE HYDROCHLORIDE 120 MG/1
120 TABLET, FILM COATED ORAL
Status: ON HOLD | COMMUNITY
End: 2024-07-04

## 2022-01-14 RX ORDER — ATORVASTATIN CALCIUM 20 MG/1
20 TABLET, FILM COATED ORAL EVERY EVENING
Status: ON HOLD | COMMUNITY
Start: 2021-12-24 | End: 2024-07-04

## 2022-01-14 RX ORDER — HYDROXYZINE HYDROCHLORIDE 50 MG/1
50 TABLET, FILM COATED ORAL 2 TIMES DAILY
Status: ON HOLD | COMMUNITY
Start: 2021-12-24 | End: 2024-07-04

## 2022-01-14 RX ORDER — CLONAZEPAM 0.5 MG/1
0.25 TABLET ORAL 2 TIMES DAILY
Status: ON HOLD | COMMUNITY
Start: 2022-01-04 | End: 2022-01-21

## 2022-01-14 RX ORDER — FLUTICASONE PROPIONATE 50 MCG
1-2 SPRAY, SUSPENSION (ML) NASAL DAILY
Status: ON HOLD | COMMUNITY
Start: 2021-12-08 | End: 2024-07-04

## 2022-01-14 RX ORDER — DESMOPRESSIN ACETATE 0.2 MG/1
0.2 TABLET ORAL AT BEDTIME
COMMUNITY
Start: 2021-12-24

## 2022-01-14 RX ORDER — INSULIN GLARGINE 100 [IU]/ML
10 INJECTION, SOLUTION SUBCUTANEOUS AT BEDTIME
Status: ON HOLD | COMMUNITY
End: 2022-01-21

## 2022-01-14 RX ORDER — TOPIRAMATE 100 MG/1
100 TABLET, FILM COATED ORAL 2 TIMES DAILY
COMMUNITY
Start: 2021-12-24

## 2022-01-14 RX ORDER — ATROPINE SULFATE 10 MG/ML
1 SOLUTION/ DROPS OPHTHALMIC 2 TIMES DAILY
COMMUNITY
Start: 2021-12-08

## 2022-01-14 RX ORDER — LISINOPRIL 5 MG/1
5 TABLET ORAL DAILY
COMMUNITY
Start: 2021-12-24

## 2022-01-15 ENCOUNTER — TELEPHONE (OUTPATIENT)
Dept: BEHAVIORAL HEALTH | Facility: CLINIC | Age: 27
End: 2022-01-15
Payer: MEDICARE

## 2022-01-15 LAB
AMPHETAMINES UR QL SCN: NORMAL
BARBITURATES UR QL: NORMAL
BENZODIAZ UR QL: NORMAL
CANNABINOIDS UR QL SCN: NORMAL
COCAINE UR QL: NORMAL
GLUCOSE BLDC GLUCOMTR-MCNC: 98 MG/DL (ref 70–99)
OPIATES UR QL SCN: NORMAL
SARS-COV-2 RNA RESP QL NAA+PROBE: NEGATIVE

## 2022-01-15 PROCEDURE — C9803 HOPD COVID-19 SPEC COLLECT: HCPCS

## 2022-01-15 PROCEDURE — 250N000013 HC RX MED GY IP 250 OP 250 PS 637: Performed by: EMERGENCY MEDICINE

## 2022-01-15 PROCEDURE — 96372 THER/PROPH/DIAG INJ SC/IM: CPT | Performed by: EMERGENCY MEDICINE

## 2022-01-15 PROCEDURE — 80307 DRUG TEST PRSMV CHEM ANLYZR: CPT | Performed by: EMERGENCY MEDICINE

## 2022-01-15 PROCEDURE — 87635 SARS-COV-2 COVID-19 AMP PRB: CPT | Performed by: EMERGENCY MEDICINE

## 2022-01-15 PROCEDURE — 250N000011 HC RX IP 250 OP 636

## 2022-01-15 PROCEDURE — 250N000011 HC RX IP 250 OP 636: Performed by: EMERGENCY MEDICINE

## 2022-01-15 RX ORDER — LORAZEPAM 2 MG/ML
INJECTION INTRAMUSCULAR
Status: COMPLETED
Start: 2022-01-15 | End: 2022-01-15

## 2022-01-15 RX ORDER — CHLORPROMAZINE HYDROCHLORIDE 25 MG/ML
25 INJECTION INTRAMUSCULAR ONCE
Status: COMPLETED | OUTPATIENT
Start: 2022-01-15 | End: 2022-01-16

## 2022-01-15 RX ORDER — DIPHENHYDRAMINE HYDROCHLORIDE 50 MG/ML
INJECTION INTRAMUSCULAR; INTRAVENOUS
Status: COMPLETED
Start: 2022-01-15 | End: 2022-01-15

## 2022-01-15 RX ORDER — SUMATRIPTAN 20 MG/1
20 SPRAY NASAL
Status: COMPLETED | OUTPATIENT
Start: 2022-01-15 | End: 2022-01-15

## 2022-01-15 RX ORDER — HYDROXYZINE HYDROCHLORIDE 25 MG/1
50 TABLET, FILM COATED ORAL
Status: COMPLETED | OUTPATIENT
Start: 2022-01-15 | End: 2022-01-15

## 2022-01-15 RX ORDER — POLYETHYLENE GLYCOL 3350 17 G
2 POWDER IN PACKET (EA) ORAL
COMMUNITY
End: 2022-06-04

## 2022-01-15 RX ORDER — LORAZEPAM 1 MG/1
1 TABLET ORAL EVERY 8 HOURS PRN
Status: DISCONTINUED | OUTPATIENT
Start: 2022-01-15 | End: 2022-01-19 | Stop reason: HOSPADM

## 2022-01-15 RX ORDER — SUMATRIPTAN 5 MG/1
10 SPRAY NASAL
Status: DISCONTINUED | OUTPATIENT
Start: 2022-01-15 | End: 2022-01-15

## 2022-01-15 RX ORDER — ONDANSETRON 4 MG/1
4 TABLET, ORALLY DISINTEGRATING ORAL EVERY 8 HOURS PRN
COMMUNITY
End: 2022-06-04

## 2022-01-15 RX ORDER — HYDROXYZINE HYDROCHLORIDE 50 MG/1
50 TABLET, FILM COATED ORAL
COMMUNITY
End: 2022-06-04

## 2022-01-15 RX ORDER — LURASIDONE HYDROCHLORIDE 20 MG/1
20 TABLET, FILM COATED ORAL
Status: ON HOLD | COMMUNITY
End: 2024-07-04

## 2022-01-15 RX ORDER — IBUPROFEN 600 MG/1
600 TABLET, FILM COATED ORAL EVERY 6 HOURS PRN
Status: DISCONTINUED | OUTPATIENT
Start: 2022-01-15 | End: 2022-01-19 | Stop reason: HOSPADM

## 2022-01-15 RX ORDER — LORAZEPAM 2 MG/ML
2 INJECTION INTRAMUSCULAR ONCE
Status: COMPLETED | OUTPATIENT
Start: 2022-01-15 | End: 2022-01-15

## 2022-01-15 RX ORDER — ACETAMINOPHEN 500 MG
1000 TABLET ORAL 3 TIMES DAILY
COMMUNITY
End: 2024-06-18

## 2022-01-15 RX ORDER — ONDANSETRON 4 MG/1
4 TABLET, ORALLY DISINTEGRATING ORAL ONCE
Status: COMPLETED | OUTPATIENT
Start: 2022-01-15 | End: 2022-01-15

## 2022-01-15 RX ORDER — CHLORPROMAZINE HYDROCHLORIDE 200 MG/1
200 TABLET, FILM COATED ORAL 2 TIMES DAILY PRN
Status: ON HOLD | COMMUNITY
End: 2022-01-21

## 2022-01-15 RX ORDER — DIPHENHYDRAMINE HYDROCHLORIDE 50 MG/ML
50 INJECTION INTRAMUSCULAR; INTRAVENOUS ONCE
Status: COMPLETED | OUTPATIENT
Start: 2022-01-15 | End: 2022-01-15

## 2022-01-15 RX ORDER — ACETAMINOPHEN 500 MG
500-1000 TABLET ORAL EVERY 6 HOURS PRN
COMMUNITY
End: 2022-01-15

## 2022-01-15 RX ORDER — ACETAMINOPHEN 325 MG/1
650 TABLET ORAL EVERY 4 HOURS PRN
Status: DISCONTINUED | OUTPATIENT
Start: 2022-01-15 | End: 2022-01-19 | Stop reason: HOSPADM

## 2022-01-15 RX ADMIN — LORAZEPAM 2 MG: 2 INJECTION INTRAMUSCULAR at 05:13

## 2022-01-15 RX ADMIN — SUMATRIPTAN 1 SPRAY: 20 SPRAY NASAL at 22:42

## 2022-01-15 RX ADMIN — ONDANSETRON 4 MG: 4 TABLET, ORALLY DISINTEGRATING ORAL at 19:43

## 2022-01-15 RX ADMIN — IBUPROFEN 600 MG: 600 TABLET, FILM COATED ORAL at 21:35

## 2022-01-15 RX ADMIN — HYDROXYZINE HYDROCHLORIDE 50 MG: 25 TABLET, FILM COATED ORAL at 21:33

## 2022-01-15 RX ADMIN — DIPHENHYDRAMINE HYDROCHLORIDE 50 MG: 50 INJECTION INTRAMUSCULAR; INTRAVENOUS at 05:13

## 2022-01-15 RX ADMIN — LORAZEPAM 2 MG: 2 INJECTION INTRAMUSCULAR; INTRAVENOUS at 05:13

## 2022-01-15 RX ADMIN — ONDANSETRON 4 MG: 4 TABLET, ORALLY DISINTEGRATING ORAL at 22:21

## 2022-01-15 RX ADMIN — LORAZEPAM 1 MG: 1 TABLET ORAL at 21:00

## 2022-01-15 RX ADMIN — Medication 5 MG: at 21:00

## 2022-01-15 NOTE — ED NOTES
Police found pt out walking by [address], pt was undressed and cell phone and shoes and cloths put in dec office

## 2022-01-15 NOTE — ED NOTES
Pt reports that based on his specific history he is supposed to get additional services at [facility] when inpatient psychiatric care is required due to his committment. MD notified. Central intake notified and informed writer RN that this information needs to be given to DEC. Writer RN spoke with Fawn at DEC who reports that information will be relayed to Parmjit who is assigned to this patient.

## 2022-01-15 NOTE — ED NOTES
Unable to locate pt in ER, call placed to [facility] for police assistance to locate pt if he has left facility.  Security notified of potential elopement, will look for pt inside hospital.
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
## 2022-01-15 NOTE — ED NOTES
Went to do my 15 min checks , someone said he went to the bathroom checked all er and no pt . Charge nurse informed , security informed
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
## 2022-01-15 NOTE — PHARMACY-ADMISSION MEDICATION HISTORY
Admission medication history interview status for this patient is complete. See [facility] admission navigator for allergy information, prior to admission medications and immunization status.     Medication history interview done, indicate source(s): Caregiver  Medication history resources (including written lists, pill bottles, clinic record): Epic, SureScript, Medication List from [facility]  Pharmacy: [id number] - [place], [place] - [address]      Changes made to PTA medication list:  Added: Acetaminophen, Lurasidone 20 mg, Nicotine patch, Nicotine lozenge, Ondansetron  Changed: None  Reported as Not Taking: None  Removed: None    Actions taken by pharmacist (provider contacted, etc): Sticky note to provider     Additional medication history information: Pt resident of [facility]. Received Medication list from  Efraín Bell    Medication reconciliation/reorder completed by provider prior to medication history?  N   (Y/N)     For patients on insulin therapy:   Basaglar 10 U Q daily before bedtime    Prior to Admission medications    Medication Sig Last Dose Taking? Auth Provider   acetaminophen (TYLENOL) 500 MG tablet Take 500-1,000 mg by mouth every 6 hours as needed for mild pain  Yes Unknown, Entered By History   atorvastatin (LIPITOR) 20 MG tablet Take 20 mg by mouth every evening Past Week at Unknown time Yes Unknown, Entered By History   atropine 1 % ophthalmic solution Place 1 drop under the tongue 2 times daily 1/14/2022 at Unknown time Yes Unknown, Entered By History   chlorproMAZINE HCl 200 MG TABS Take 200 mg by mouth 2 times daily 1/14/2022 at Unknown time Yes Unknown, Entered By History   clonazePAM (KLONOPIN) 0.5 MG tablet Take 0.25 mg by mouth 2 times daily  1/14/2022 at Unknown time Yes Unknown, Entered By History   desmopressin (DDAVP) 0.2 MG tablet Take 0.2 mg by mouth At Bedtime Past Week at Unknown time Yes Unknown, Entered By History    fluticasone (FLONASE) 50 MCG/ACT nasal spray Spray 1-2 sprays into both nostrils daily  1/14/2022 at Unknown time Yes Unknown, Entered By History   guanFACINE (TENEX) 2 MG tablet Take 2 mg by mouth 2 times daily 1/14/2022 at Unknown time Yes Unknown, Entered By History   hydrOXYzine (ATARAX) 50 MG tablet Take 50 mg by mouth 2 times daily 1/14/2022 at Unknown time Yes Unknown, Entered By History   insulin glargine (BASAGLAR KWIKPEN) 100 UNIT/ML pen Inject 10 Units Subcutaneous daily Past Week at Unknown time Yes Unknown, Entered By History   lisinopril (ZESTRIL) 5 MG tablet Take 5 mg by mouth daily 1/14/2022 at Unknown time Yes Unknown, Entered By History   lurasidone (LATUDA) 120 MG TABS tablet Take 120 mg by mouth daily with food Past Week at Unknown time Yes Unknown, Entered By History   lurasidone (LATUDA) 20 MG TABS tablet Take 20 mg by mouth daily with food Take 1 tablet (with one 120 mg tab for total of 140 mg) by mouth with supper in the evening daily. Take with full meal of at least 350 calories Past Week at Unknown time Yes Unknown, Entered By History   metFORMIN (GLUCOPHAGE) 1000 MG tablet Take 1,000 mg by mouth 2 times daily (with meals) 1/14/2022 at Unknown time Yes Unknown, Entered By History   nicotine (COMMIT) 2 MG lozenge Place 2 mg inside cheek every hour as needed for smoking cessation  Yes Unknown, Entered By History   nicotine ([employer] NICOTINE) 7 MG/24HR 24 hr patch Place 1 patch onto the skin every 24 hours  Yes Unknown, Entered By History   ondansetron (ZOFRAN-ODT) 4 MG ODT tab Take 4 mg by mouth every 8 hours as needed for nausea  Yes Unknown, Entered By History   pantoprazole (PROTONIX) 40 MG EC tablet Take 40 mg by mouth 2 times daily 1/14/2022 at Unknown time Yes Unknown, Entered By History   topiramate (TOPAMAX) 100 MG tablet Take 100 mg by mouth 2 times daily 1/14/2022 at Unknown time Yes Unknown, Entered By History   SUMAtriptan (IMITREX) 5 MG/ACT nasal spray 1-2 sprays,  may repeat every  2 hours until a maximum dose 40mg/24 hours  at PRN  Denis Monet MD

## 2022-01-15 NOTE — ED NOTES
Pt declines hydroxyzine at this time. Is calm and cooperative. Provided w/ box lunch, soda, and warm blanket. Call light within reach. Awaiting DEC. Pt is aware of plan of care.

## 2022-01-15 NOTE — ED NOTES
Bed: ED12  Expected date:   Expected time:   Means of arrival:   Comments:  A594- combative on PD hold

## 2022-01-16 ENCOUNTER — TELEPHONE (OUTPATIENT)
Dept: BEHAVIORAL HEALTH | Facility: CLINIC | Age: 27
End: 2022-01-16
Payer: MEDICARE

## 2022-01-16 ENCOUNTER — TELEPHONE (OUTPATIENT)
Dept: BEHAVIORAL HEALTH | Facility: CLINIC | Age: 27
End: 2022-01-16

## 2022-01-16 LAB
GLUCOSE BLDC GLUCOMTR-MCNC: 135 MG/DL (ref 70–99)
GLUCOSE BLDC GLUCOMTR-MCNC: 138 MG/DL (ref 70–99)
GLUCOSE BLDC GLUCOMTR-MCNC: 84 MG/DL (ref 70–99)

## 2022-01-16 PROCEDURE — 250N000011 HC RX IP 250 OP 636: Performed by: EMERGENCY MEDICINE

## 2022-01-16 PROCEDURE — 250N000013 HC RX MED GY IP 250 OP 250 PS 637: Performed by: EMERGENCY MEDICINE

## 2022-01-16 PROCEDURE — 96374 THER/PROPH/DIAG INJ IV PUSH: CPT

## 2022-01-16 PROCEDURE — 250N000011 HC RX IP 250 OP 636

## 2022-01-16 PROCEDURE — 96372 THER/PROPH/DIAG INJ SC/IM: CPT | Performed by: EMERGENCY MEDICINE

## 2022-01-16 PROCEDURE — 250N000012 HC RX MED GY IP 250 OP 636 PS 637: Performed by: EMERGENCY MEDICINE

## 2022-01-16 PROCEDURE — 250N000009 HC RX 250: Performed by: EMERGENCY MEDICINE

## 2022-01-16 RX ORDER — FLUTICASONE PROPIONATE 50 MCG
1-2 SPRAY, SUSPENSION (ML) NASAL DAILY
Status: DISCONTINUED | OUTPATIENT
Start: 2022-01-16 | End: 2022-01-19 | Stop reason: HOSPADM

## 2022-01-16 RX ORDER — DESMOPRESSIN ACETATE 0.1 MG/1
200 TABLET ORAL AT BEDTIME
Status: DISCONTINUED | OUTPATIENT
Start: 2022-01-16 | End: 2022-01-19 | Stop reason: HOSPADM

## 2022-01-16 RX ORDER — HYDROXYZINE HYDROCHLORIDE 25 MG/1
50 TABLET, FILM COATED ORAL 2 TIMES DAILY
Status: DISCONTINUED | OUTPATIENT
Start: 2022-01-16 | End: 2022-01-19 | Stop reason: HOSPADM

## 2022-01-16 RX ORDER — PANTOPRAZOLE SODIUM 40 MG/1
40 TABLET, DELAYED RELEASE ORAL 2 TIMES DAILY
Status: DISCONTINUED | OUTPATIENT
Start: 2022-01-16 | End: 2022-01-19 | Stop reason: HOSPADM

## 2022-01-16 RX ORDER — ATORVASTATIN CALCIUM 20 MG/1
20 TABLET, FILM COATED ORAL EVERY EVENING
Status: DISCONTINUED | OUTPATIENT
Start: 2022-01-16 | End: 2022-01-19 | Stop reason: HOSPADM

## 2022-01-16 RX ORDER — ONDANSETRON 4 MG/1
4 TABLET, ORALLY DISINTEGRATING ORAL EVERY 6 HOURS PRN
Status: DISCONTINUED | OUTPATIENT
Start: 2022-01-16 | End: 2022-01-19 | Stop reason: HOSPADM

## 2022-01-16 RX ORDER — CHLORPROMAZINE HYDROCHLORIDE 25 MG/1
200 TABLET, FILM COATED ORAL 2 TIMES DAILY
Status: DISCONTINUED | OUTPATIENT
Start: 2022-01-16 | End: 2022-01-19 | Stop reason: HOSPADM

## 2022-01-16 RX ORDER — CHLORPROMAZINE HYDROCHLORIDE 100 MG/1
200 TABLET, FILM COATED ORAL 2 TIMES DAILY
Status: DISCONTINUED | OUTPATIENT
Start: 2022-01-16 | End: 2022-01-16

## 2022-01-16 RX ORDER — CLONAZEPAM 0.25 MG/1
0.25 TABLET, ORALLY DISINTEGRATING ORAL 2 TIMES DAILY
Status: DISCONTINUED | OUTPATIENT
Start: 2022-01-16 | End: 2022-01-19 | Stop reason: HOSPADM

## 2022-01-16 RX ORDER — LURASIDONE HYDROCHLORIDE 60 MG/1
120 TABLET, FILM COATED ORAL
Status: DISCONTINUED | OUTPATIENT
Start: 2022-01-16 | End: 2022-01-16

## 2022-01-16 RX ORDER — LISINOPRIL 5 MG/1
5 TABLET ORAL DAILY
Status: DISCONTINUED | OUTPATIENT
Start: 2022-01-16 | End: 2022-01-19 | Stop reason: HOSPADM

## 2022-01-16 RX ORDER — TOPIRAMATE 100 MG/1
100 TABLET, FILM COATED ORAL 2 TIMES DAILY
Status: DISCONTINUED | OUTPATIENT
Start: 2022-01-16 | End: 2022-01-19 | Stop reason: HOSPADM

## 2022-01-16 RX ORDER — DESMOPRESSIN ACETATE 0.1 MG/1
200 TABLET ORAL AT BEDTIME
Status: DISCONTINUED | OUTPATIENT
Start: 2022-01-16 | End: 2022-01-16

## 2022-01-16 RX ORDER — GUANFACINE 1 MG/1
2 TABLET ORAL 2 TIMES DAILY
Status: DISCONTINUED | OUTPATIENT
Start: 2022-01-16 | End: 2022-01-19 | Stop reason: HOSPADM

## 2022-01-16 RX ORDER — ATROPINE SULFATE 10 MG/ML
1 SOLUTION/ DROPS OPHTHALMIC 2 TIMES DAILY
Status: DISCONTINUED | OUTPATIENT
Start: 2022-01-16 | End: 2022-01-19 | Stop reason: HOSPADM

## 2022-01-16 RX ORDER — HYDROXYZINE HYDROCHLORIDE 25 MG/1
25 TABLET, FILM COATED ORAL 3 TIMES DAILY PRN
Status: DISCONTINUED | OUTPATIENT
Start: 2022-01-16 | End: 2022-01-19 | Stop reason: HOSPADM

## 2022-01-16 RX ORDER — SUMATRIPTAN 20 MG/1
20 SPRAY NASAL ONCE
Status: COMPLETED | OUTPATIENT
Start: 2022-01-16 | End: 2022-01-16

## 2022-01-16 RX ORDER — LURASIDONE HYDROCHLORIDE 60 MG/1
120 TABLET, FILM COATED ORAL DAILY
Status: DISCONTINUED | OUTPATIENT
Start: 2022-01-16 | End: 2022-01-16

## 2022-01-16 RX ADMIN — SUMATRIPTAN 1 SPRAY: 20 SPRAY NASAL at 14:24

## 2022-01-16 RX ADMIN — LURASIDONE HYDROCHLORIDE 140 MG: 40 TABLET, FILM COATED ORAL at 19:14

## 2022-01-16 RX ADMIN — LISINOPRIL 5 MG: 5 TABLET ORAL at 08:48

## 2022-01-16 RX ADMIN — GUANFACINE 2 MG: 1 TABLET ORAL at 20:41

## 2022-01-16 RX ADMIN — MIDAZOLAM 2 MG: 1 INJECTION INTRAMUSCULAR; INTRAVENOUS at 10:29

## 2022-01-16 RX ADMIN — NICOTINE 1 PATCH: 7 PATCH, EXTENDED RELEASE TRANSDERMAL at 08:49

## 2022-01-16 RX ADMIN — INSULIN GLARGINE 10 UNITS: 100 INJECTION, SOLUTION SUBCUTANEOUS at 22:38

## 2022-01-16 RX ADMIN — HYDROXYZINE HYDROCHLORIDE 50 MG: 25 TABLET ORAL at 08:47

## 2022-01-16 RX ADMIN — CHLORPROMAZINE HYDROCHLORIDE 25 MG: 25 INJECTION INTRAMUSCULAR at 10:28

## 2022-01-16 RX ADMIN — TOPIRAMATE 100 MG: 100 TABLET, FILM COATED ORAL at 08:49

## 2022-01-16 RX ADMIN — TOPIRAMATE 100 MG: 100 TABLET, FILM COATED ORAL at 20:41

## 2022-01-16 RX ADMIN — GUANFACINE 2 MG: 1 TABLET ORAL at 08:49

## 2022-01-16 RX ADMIN — METFORMIN HYDROCHLORIDE 1000 MG: 500 TABLET, FILM COATED ORAL at 19:15

## 2022-01-16 RX ADMIN — CHLORPROMAZINE HYDROCHLORIDE 200 MG: 25 TABLET, SUGAR COATED ORAL at 22:34

## 2022-01-16 RX ADMIN — ATROPINE SULFATE 1 DROP: 10 SOLUTION/ DROPS OPHTHALMIC at 20:40

## 2022-01-16 RX ADMIN — METFORMIN HYDROCHLORIDE 1000 MG: 500 TABLET, FILM COATED ORAL at 08:48

## 2022-01-16 RX ADMIN — ATORVASTATIN CALCIUM 20 MG: 20 TABLET, FILM COATED ORAL at 20:41

## 2022-01-16 RX ADMIN — PANTOPRAZOLE SODIUM 40 MG: 40 TABLET, DELAYED RELEASE ORAL at 20:40

## 2022-01-16 RX ADMIN — ATROPINE SULFATE 1 DROP: 10 SOLUTION/ DROPS OPHTHALMIC at 08:48

## 2022-01-16 RX ADMIN — Medication 5 MG: at 22:36

## 2022-01-16 RX ADMIN — HYDROXYZINE HYDROCHLORIDE 50 MG: 25 TABLET ORAL at 20:40

## 2022-01-16 RX ADMIN — CLONAZEPAM 0.25 MG: 0.25 TABLET, ORALLY DISINTEGRATING ORAL at 20:42

## 2022-01-16 RX ADMIN — DESMOPRESSIN ACETATE 200 MCG: 0.1 TABLET ORAL at 22:03

## 2022-01-16 RX ADMIN — PANTOPRAZOLE SODIUM 40 MG: 40 TABLET, DELAYED RELEASE ORAL at 08:47

## 2022-01-16 RX ADMIN — CHLORPROMAZINE HYDROCHLORIDE 200 MG: 25 TABLET, SUGAR COATED ORAL at 14:24

## 2022-01-16 NOTE — PROGRESS NOTES
Patient having increased agitation, making verbal threats towards staff. Requesting to see MD, notified. Patients behaviors continued to escalate. Code 21, called, 4 point restraints applied. Given IM Versed and Thorazine.
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
## 2022-01-16 NOTE — ED NOTES
Pt. Given heat pack for his left hip pain. Pt. Stated had previously had surgery to the left hip. Pt. States 10/10 pain. Pt. Given Ibuprofen for the pain.
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
## 2022-01-16 NOTE — ED NOTES
Notified Dr. Ybarra. Pt. Vomited. Pt. Stating 9/10 headache and requesting migraine medication. Orders placed.

## 2022-01-16 NOTE — TELEPHONE ENCOUNTER
R:  Per chart review, metro only for placement. Bed search update @ 0448:    [facility]: No appropriate beds available  [facility]  [facility]: @ cap per website  [facility]:@ cap per website  [facility]: @ cap per website  [facility]: @ cap per website  [facility]: @ cap per website  [facility]: @ cap per website  [facility]: @ cap per website    Pt remains on work list until appropriate placement is available

## 2022-01-16 NOTE — TELEPHONE ENCOUNTER
R:  Patient cleared and ready for behavioral bed placement: Yes   No appropriate beds available within the [facility] system and no discharges occurred.  Called around for high acuity beds and none available for review:   Pt remains on wait list pending bed availability.        Bed Search Update Adults:     [facility] beds available     [facility]-No beds available     [facility]-No beds available     [facility]-No beds available     [facility]-No beds available     [facility]-No beds available     [facility]-No beds available     [facility]-No beds available     [facility]-No beds available     [facility]-Low acuity only. Mixed unit (adol, adult and subhash)     [place]-No beds available.  No current aggression     [facility]-No beds available     [facility]-No beds available     [facility]-No beds available     [facility]-No beds available     [facility]-No beds available     [facility]-No beds available. Must be holdable.     [facility]-No beds available     [facility]-Voluntary/[place] only. No hx violence or sexual assault.     [facility]-No beds available     [facility]-No beds available     [facility]-No recent hx violence/aggression. Must be able to program in groups.     [facility]-Low acuity only.     [facility]-No beds available.  No recent violence or aggression.     [facility]-No beds available     [facility]-No beds available     [facility]-Closed due to water [place]

## 2022-01-17 ENCOUNTER — TELEPHONE (OUTPATIENT)
Dept: BEHAVIORAL HEALTH | Facility: CLINIC | Age: 27
End: 2022-01-17
Payer: MEDICARE

## 2022-01-17 LAB
CREAT SERPL-MCNC: 0.89 MG/DL (ref 0.66–1.25)
GFR SERPL CREATININE-BSD FRML MDRD: >90 ML/MIN/1.73M2
GLUCOSE BLDC GLUCOMTR-MCNC: 109 MG/DL (ref 70–99)
GLUCOSE BLDC GLUCOMTR-MCNC: 126 MG/DL (ref 70–99)
GLUCOSE BLDC GLUCOMTR-MCNC: 74 MG/DL (ref 70–99)
HBA1C MFR BLD: 5.8 % (ref 0–5.6)

## 2022-01-17 PROCEDURE — 36415 COLL VENOUS BLD VENIPUNCTURE: CPT | Performed by: EMERGENCY MEDICINE

## 2022-01-17 PROCEDURE — 250N000013 HC RX MED GY IP 250 OP 250 PS 637: Performed by: EMERGENCY MEDICINE

## 2022-01-17 PROCEDURE — 82565 ASSAY OF CREATININE: CPT | Performed by: EMERGENCY MEDICINE

## 2022-01-17 PROCEDURE — 83036 HEMOGLOBIN GLYCOSYLATED A1C: CPT | Performed by: EMERGENCY MEDICINE

## 2022-01-17 RX ORDER — NICOTINE POLACRILEX 4 MG
15-30 LOZENGE BUCCAL
Status: DISCONTINUED | OUTPATIENT
Start: 2022-01-17 | End: 2022-01-19 | Stop reason: HOSPADM

## 2022-01-17 RX ORDER — DEXTROSE MONOHYDRATE 25 G/50ML
25-50 INJECTION, SOLUTION INTRAVENOUS
Status: DISCONTINUED | OUTPATIENT
Start: 2022-01-17 | End: 2022-01-19 | Stop reason: HOSPADM

## 2022-01-17 RX ADMIN — TOPIRAMATE 100 MG: 100 TABLET, FILM COATED ORAL at 09:49

## 2022-01-17 RX ADMIN — GUANFACINE 2 MG: 1 TABLET ORAL at 21:31

## 2022-01-17 RX ADMIN — PANTOPRAZOLE SODIUM 40 MG: 40 TABLET, DELAYED RELEASE ORAL at 19:27

## 2022-01-17 RX ADMIN — PANTOPRAZOLE SODIUM 40 MG: 40 TABLET, DELAYED RELEASE ORAL at 09:50

## 2022-01-17 RX ADMIN — GUANFACINE 2 MG: 1 TABLET ORAL at 09:46

## 2022-01-17 RX ADMIN — METFORMIN HYDROCHLORIDE 1000 MG: 500 TABLET, FILM COATED ORAL at 09:48

## 2022-01-17 RX ADMIN — LISINOPRIL 5 MG: 5 TABLET ORAL at 09:47

## 2022-01-17 RX ADMIN — CHLORPROMAZINE HYDROCHLORIDE 200 MG: 25 TABLET, SUGAR COATED ORAL at 21:31

## 2022-01-17 RX ADMIN — LURASIDONE HYDROCHLORIDE 140 MG: 40 TABLET, FILM COATED ORAL at 19:27

## 2022-01-17 RX ADMIN — TOPIRAMATE 100 MG: 100 TABLET, FILM COATED ORAL at 21:30

## 2022-01-17 RX ADMIN — CHLORPROMAZINE HYDROCHLORIDE 200 MG: 25 TABLET, SUGAR COATED ORAL at 14:33

## 2022-01-17 RX ADMIN — HYDROXYZINE HYDROCHLORIDE 50 MG: 25 TABLET ORAL at 09:49

## 2022-01-17 RX ADMIN — DESMOPRESSIN ACETATE 200 MCG: 0.1 TABLET ORAL at 21:31

## 2022-01-17 RX ADMIN — FLUTICASONE PROPIONATE 1 SPRAY: 50 SPRAY, METERED NASAL at 09:45

## 2022-01-17 RX ADMIN — HYDROXYZINE HYDROCHLORIDE 50 MG: 25 TABLET ORAL at 19:27

## 2022-01-17 RX ADMIN — NICOTINE 1 PATCH: 7 PATCH, EXTENDED RELEASE TRANSDERMAL at 09:55

## 2022-01-17 RX ADMIN — CLONAZEPAM 0.25 MG: 0.25 TABLET, ORALLY DISINTEGRATING ORAL at 09:50

## 2022-01-17 RX ADMIN — ATROPINE SULFATE 1 DROP: 10 SOLUTION/ DROPS OPHTHALMIC at 09:51

## 2022-01-17 RX ADMIN — ATORVASTATIN CALCIUM 20 MG: 20 TABLET, FILM COATED ORAL at 21:31

## 2022-01-17 RX ADMIN — METFORMIN HYDROCHLORIDE 1000 MG: 500 TABLET, FILM COATED ORAL at 19:27
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
## 2022-01-17 NOTE — ED NOTES
Pt has been calm and cooperative all evening, had dinner from the cafeteria and two additional boxed lunches. Pt agreeable to talking all prescribed medications and is currently resting for the night.

## 2022-01-17 NOTE — TELEPHONE ENCOUNTER
R:  Per chart review, metro only for placement.     0037 - Called 12 re: milieu acuity and if they are able to accommodate admissions as they are case-case. Charge RN will review and call intake back. 0115 - Charge RN called back expressing concerns re: pts acuity level and the current milieu acuity and would like case to be reviewed by oncoming shift.     Continuing bed search of metro @ 0135:    [facility]: No other appropriate beds  [facility] @ cap  [facility]: @ cap per website  [facility]:@ cap per website  [facility]: @ cap per website  [facility]: @ cap per website  [facility]: @ cap per website  [facility]: @ cap per website  [facility]: @ cap per website    Pt remains on work list until appropriate placement is available

## 2022-01-17 NOTE — TELEPHONE ENCOUNTER
R: The pt is currently in the [facility] ER awaiting placement.     Intake Morning Bed Search (Done at 8:00am):    [facility] is posting 0 beds.   [facility] is posting 0 beds.  [facility] is posting 0 beds.  [facility] is posting 0 beds.  [facility] is posting 0 beds.  [facility] is posting 0 beds.  [facility] is posting 0 beds.  [facility] is posting 0 beds.  [facility] is posting 2 beds. Mixed unit 12+. Low acuity only. Pt does not fit open bed available.     [facility] is positing 0 beds. No aggression.   [facility] is posting 0 beds.   [facility] is posting 1 bed. Low acuity only.Pt does not fit open bed available.     [facility] is posting 0 beds.  [facility] is posting 1 bed. Low acuity. Pt does not fit open bed available.     [facility] is posting 0 beds. 72 hr hold required.   [facility] is posting 2 beds. Call for details.  [facility] is posting 0 bed. Vol only, No Hx of aggression, violence or assault. No sexual offenders. No 72 hr holds.  [facility] is posting 8 beds. (Must have the cognitive ability to do programming. No aggressive or violent behavior or recent HX in the last 2 yrs. MH must be primary.) Pt does not fit open bed available.     [facility] is posting 0 beds. Low acuity only. Violence and aggression capped.   [facility] is posting 0 beds. Low acuity, Neg Covid.   [facility] is posting 0 beds. Covid neg. Vol only. Combined adolescent and adult unit. No aggressive or violent behavior. No registered sex offenders.   [facility] is posting 0 beds. Call for details.  [facility] is posting 0 beds.    8:14a Intake called [facility] ([place] facility is reviewing for low acuity pt's.Pt does not fit open bed available.       [facility] at capacity.  The pt remains on the waitlist. Intake continues to identify appropriate bed placement.

## 2022-01-17 NOTE — TELEPHONE ENCOUNTER
R:   No beds available @ [place] or [facility].       Bed Search Update Adults outside [place] :   [facility]-No beds available   [facility]-No beds available   [facility]-No beds available   [facility]-No beds available   [facility]-No beds available   [facility]-No beds available   [facility]-No beds available   [facility]-No beds available   [facility]-No beds available   [facility]-Low acuity only. Mixed unit (adol, adult and subhash) -2 beds available  [place]-No beds available.  No current aggression   [place]-No beds available   [facility]-No beds available   [facility]-No beds available   [facility]- 2 beds available (very low acuity)  [facility]-No beds available   [facility]-No beds available. Must be holdable.   [facility]- 3 beds available   [facility]-Voluntary/[place] only. No hx violence or sexual assault.  -no beds available  [facility]-No beds available    [facility]-No beds available   [facility]-No recent hx violence/aggression. Must be able to program in groups.  -6 beds available  [facility]-Low acuity only. -No beds available  [facility]-No beds available.  No recent violence or aggression.   [facility]-No beds available   [facility]-No beds available   [facility] -No beds available  [facility] -No beds available  [facility]-No beds available.

## 2022-01-17 NOTE — ED PROVIDER NOTES
SO taken, from , agitated,  wont take back, DEC agrees admission.  + true allergy to haldol and zyprexa, Thorazine only      Gopi Reese MD  01/17/22 06

## 2022-01-17 NOTE — ED NOTES
Assumed care of patient from 07:00 to     Pt is anxious and restless but cooperative. He is repetitive with words and makes frequent requests to talk over and over again. He denies pain, SI, SH, HI. Pt willing to be cooperative if that helps him get somewhere. He requests that writer call his PO and let her know he is in the hospital (Mahsa Goodwin - [phone]). Pt attempted to get a hold of his  Pool ([phone]) but was not successful. Writer spoke with Jason from pts group [facility] ([phone]) to update him on pts status. Jason states pt has a [facility] commitment for medication administration. Jason also mentions that he believes pts outbursts may be related to mental and emotional stressors regarding an impending surgery for a stomach mass in early February.

## 2022-01-18 ENCOUNTER — TELEPHONE (OUTPATIENT)
Dept: BEHAVIORAL HEALTH | Facility: CLINIC | Age: 27
End: 2022-01-18
Payer: MEDICARE

## 2022-01-18 VITALS
RESPIRATION RATE: 18 BRPM | SYSTOLIC BLOOD PRESSURE: 111 MMHG | TEMPERATURE: 98.2 F | OXYGEN SATURATION: 98 % | HEART RATE: 80 BPM | DIASTOLIC BLOOD PRESSURE: 81 MMHG

## 2022-01-18 LAB
GLUCOSE BLDC GLUCOMTR-MCNC: 100 MG/DL (ref 70–99)
GLUCOSE BLDC GLUCOMTR-MCNC: 138 MG/DL (ref 70–99)

## 2022-01-18 PROCEDURE — 250N000013 HC RX MED GY IP 250 OP 250 PS 637: Performed by: EMERGENCY MEDICINE

## 2022-01-18 PROCEDURE — 96372 THER/PROPH/DIAG INJ SC/IM: CPT | Mod: 59

## 2022-01-18 RX ADMIN — GUANFACINE 2 MG: 1 TABLET ORAL at 07:53

## 2022-01-18 RX ADMIN — METFORMIN HYDROCHLORIDE 1000 MG: 500 TABLET, FILM COATED ORAL at 07:54

## 2022-01-18 RX ADMIN — CLONAZEPAM 0.25 MG: 0.25 TABLET, ORALLY DISINTEGRATING ORAL at 08:35

## 2022-01-18 RX ADMIN — LURASIDONE HYDROCHLORIDE 140 MG: 40 TABLET, FILM COATED ORAL at 18:02

## 2022-01-18 RX ADMIN — HYDROXYZINE HYDROCHLORIDE 50 MG: 25 TABLET ORAL at 19:58

## 2022-01-18 RX ADMIN — DESMOPRESSIN ACETATE 200 MCG: 0.1 TABLET ORAL at 21:31

## 2022-01-18 RX ADMIN — NICOTINE 1 PATCH: 7 PATCH, EXTENDED RELEASE TRANSDERMAL at 08:02

## 2022-01-18 RX ADMIN — PANTOPRAZOLE SODIUM 40 MG: 40 TABLET, DELAYED RELEASE ORAL at 07:52

## 2022-01-18 RX ADMIN — LISINOPRIL 5 MG: 5 TABLET ORAL at 07:54

## 2022-01-18 RX ADMIN — ATORVASTATIN CALCIUM 20 MG: 20 TABLET, FILM COATED ORAL at 19:58

## 2022-01-18 RX ADMIN — FLUTICASONE PROPIONATE 1 SPRAY: 50 SPRAY, METERED NASAL at 07:53

## 2022-01-18 RX ADMIN — TOPIRAMATE 100 MG: 100 TABLET, FILM COATED ORAL at 07:53

## 2022-01-18 RX ADMIN — METFORMIN HYDROCHLORIDE 1000 MG: 500 TABLET, FILM COATED ORAL at 18:02

## 2022-01-18 RX ADMIN — ATROPINE SULFATE 1 DROP: 10 SOLUTION/ DROPS OPHTHALMIC at 21:30

## 2022-01-18 RX ADMIN — TOPIRAMATE 100 MG: 100 TABLET, FILM COATED ORAL at 19:58

## 2022-01-18 RX ADMIN — CHLORPROMAZINE HYDROCHLORIDE 200 MG: 25 TABLET, SUGAR COATED ORAL at 14:58

## 2022-01-18 RX ADMIN — PANTOPRAZOLE SODIUM 40 MG: 40 TABLET, DELAYED RELEASE ORAL at 19:58

## 2022-01-18 RX ADMIN — ATROPINE SULFATE 1 DROP: 10 SOLUTION/ DROPS OPHTHALMIC at 07:53

## 2022-01-18 RX ADMIN — CHLORPROMAZINE HYDROCHLORIDE 200 MG: 25 TABLET, SUGAR COATED ORAL at 21:31

## 2022-01-18 RX ADMIN — HYDROXYZINE HYDROCHLORIDE 50 MG: 25 TABLET ORAL at 07:52

## 2022-01-18 RX ADMIN — GUANFACINE 2 MG: 1 TABLET ORAL at 19:58

## 2022-01-18 NOTE — ED NOTES
Patient signed out    26-year-old male from a group home and awaiting mental health bed as he is unable to return to the group home.  Presented with agitation and psychosis.  He is on a 72-hour hold.  Calm during the day shift today.  No acute interventions were needed.    Home medications have been ordered.  He is noted to have Zyprexa and haloperidol allergy and that if he were to become agitated Thorazine is what works for him.  Blood sugar was stable.  Scheduled glucose checks ordered    MD Cole Velasquez, Abdirizak Eldridge MD  01/18/22 8431

## 2022-01-18 NOTE — ED NOTES
Switching to sliding scale insulin. Keep Lantus pen in boarder bin. Continue metformin per pharmacy.

## 2022-01-18 NOTE — PROGRESS NOTES
Eric Fall is reviewed for [facility] service. Will follow and meet with patient/family/care team as able or requested.     Ronal Duran  [facility], [facility]/DEC Extended Care   [phone]

## 2022-01-18 NOTE — ED NOTES
Pt approached writer requesting we call his care worked to get an update on placement.  called and message left with return number for updates. Pt up to bathroom, stable gait, presents excitable but redirectable.

## 2022-01-18 NOTE — TELEPHONE ENCOUNTER
R: The pt is currently in the [facility] ER awaiting placement.     Intake Morning Bed Search (Done at 8:00am):    [facility] is posting 0 beds.   [facility] is posting 0 beds.  [facility] is posting 0 beds.  [facility] is posting 0 beds.  [facility] is posting 0 beds.  [facility] is posting 0 beds.  [facility] is posting 0 beds.  [facility] is posting 0 beds.  [facility] is posting 4 beds. Mixed unit 12+. Low acuity only. Pt does not fit open bed available.     [facility] is positing 0 beds. No aggression.   [facility] is posting 0 beds.   [facility] is posting 0 beds. Low acuity only.  [facility] is posting 0 beds.  [facility] is posting 1 bed. Low acuity. Pt does not fit open bed available.     [facility] is posting 2 beds. 72 hr hold required.   [facility]. Call for details.  [facility] is posting 1 bed. Vol only, No Hx of aggression, violence, or assault. No sexual offenders. No 72 hr holds.Pt does not fit open bed available.     [facility] is posting 6 beds. (Must have the cognitive ability to do programming. No aggressive or violent behavior or recent HX in the last 2 yrs. MH must be primary.)Pt does not fit open bed available.     [facility] is posting 0 beds. Low acuity only. Violence and aggression capped.   [facility] is posting 0 beds. Low acuity, Neg Covid.   [facility] is posting 1 bed. Covid neg. Vol only. Combined adolescent and adult unit. No aggressive or violent behavior. No registered sex offenders. Pt does not fit open bed available.     [facility] is posting 0 beds. Call for details.  [facility] is posting 0 beds.    8:15a Intake called [facility] ([place])- facility is in bed meeting. Intake left a voicemail for up to date bed availability. Intake awaiting     8:17a Intake called [facility] facility is reviewing for low  acuity pt's. Pt does not fit open bed available.       [facility] at [facility].  The pt remains on the waitlist. Intake continues to identify appropriate bed placement.

## 2022-01-18 NOTE — ED NOTES
Patient up to restroom. Patient also given a boxed lunch and orange juice per request. Repeat vital signs obtained by RN.

## 2022-01-19 ENCOUNTER — HOSPITAL ENCOUNTER (INPATIENT)
Facility: CLINIC | Age: 27
LOS: 2 days | Discharge: NURSING, REHAB OR LONG-TERM CARE | DRG: 885 | End: 2022-01-21
Attending: PSYCHIATRY & NEUROLOGY | Admitting: PSYCHIATRY & NEUROLOGY
Payer: MEDICARE

## 2022-01-19 DIAGNOSIS — R46.89 AGGRESSION: ICD-10-CM

## 2022-01-19 DIAGNOSIS — Z79.4 TYPE 2 DIABETES MELLITUS WITHOUT COMPLICATION, WITH LONG-TERM CURRENT USE OF INSULIN (H): ICD-10-CM

## 2022-01-19 DIAGNOSIS — F51.02 ADJUSTMENT INSOMNIA: ICD-10-CM

## 2022-01-19 DIAGNOSIS — F25.9 SCHIZOAFFECTIVE DISORDER, UNSPECIFIED TYPE (H): Primary | ICD-10-CM

## 2022-01-19 DIAGNOSIS — E11.9 TYPE 2 DIABETES MELLITUS WITHOUT COMPLICATION, WITH LONG-TERM CURRENT USE OF INSULIN (H): ICD-10-CM

## 2022-01-19 PROBLEM — F29 PSYCHOTIC DISORDER (H): Status: ACTIVE | Noted: 2022-01-19

## 2022-01-19 LAB
ANION GAP SERPL CALCULATED.3IONS-SCNC: 11 MMOL/L (ref 5–18)
BUN SERPL-MCNC: 12 MG/DL (ref 8–22)
CALCIUM SERPL-MCNC: 10.5 MG/DL (ref 8.5–10.5)
CHLORIDE BLD-SCNC: 102 MMOL/L (ref 98–107)
CO2 SERPL-SCNC: 23 MMOL/L (ref 22–31)
CREAT SERPL-MCNC: 1.07 MG/DL (ref 0.7–1.3)
ERYTHROCYTE [DISTWIDTH] IN BLOOD BY AUTOMATED COUNT: 11.9 % (ref 10–15)
GFR SERPL CREATININE-BSD FRML MDRD: >90 ML/MIN/1.73M2
GLUCOSE BLD-MCNC: 94 MG/DL (ref 70–125)
GLUCOSE BLDC GLUCOMTR-MCNC: 114 MG/DL (ref 70–99)
GLUCOSE BLDC GLUCOMTR-MCNC: 135 MG/DL (ref 70–99)
GLUCOSE BLDC GLUCOMTR-MCNC: 86 MG/DL (ref 70–99)
GLUCOSE BLDC GLUCOMTR-MCNC: 97 MG/DL (ref 70–99)
HCT VFR BLD AUTO: 45.4 % (ref 40–53)
HGB BLD-MCNC: 15.7 G/DL (ref 13.3–17.7)
MCH RBC QN AUTO: 32.1 PG (ref 26.5–33)
MCHC RBC AUTO-ENTMCNC: 34.6 G/DL (ref 31.5–36.5)
MCV RBC AUTO: 93 FL (ref 78–100)
PLATELET # BLD AUTO: 334 10E3/UL (ref 150–450)
POTASSIUM BLD-SCNC: 4.1 MMOL/L (ref 3.5–5)
RBC # BLD AUTO: 4.89 10E6/UL (ref 4.4–5.9)
SODIUM SERPL-SCNC: 136 MMOL/L (ref 136–145)
WBC # BLD AUTO: 6.8 10E3/UL (ref 4–11)

## 2022-01-19 PROCEDURE — 128N000001 HC R&B CD/MH ADULT

## 2022-01-19 PROCEDURE — 99222 1ST HOSP IP/OBS MODERATE 55: CPT

## 2022-01-19 PROCEDURE — 250N000013 HC RX MED GY IP 250 OP 250 PS 637: Performed by: PSYCHIATRY & NEUROLOGY

## 2022-01-19 PROCEDURE — 250N000009 HC RX 250: Performed by: NURSE PRACTITIONER

## 2022-01-19 PROCEDURE — 85027 COMPLETE CBC AUTOMATED: CPT

## 2022-01-19 PROCEDURE — 80048 BASIC METABOLIC PNL TOTAL CA: CPT

## 2022-01-19 PROCEDURE — 250N000013 HC RX MED GY IP 250 OP 250 PS 637: Performed by: NURSE PRACTITIONER

## 2022-01-19 PROCEDURE — 99207 PR CONSULT E&M CHANGED TO INITIAL LEVEL: CPT

## 2022-01-19 PROCEDURE — 250N000013 HC RX MED GY IP 250 OP 250 PS 637

## 2022-01-19 PROCEDURE — 36415 COLL VENOUS BLD VENIPUNCTURE: CPT

## 2022-01-19 RX ORDER — LOPERAMIDE HCL 2 MG
2 CAPSULE ORAL 4 TIMES DAILY PRN
Status: DISCONTINUED | OUTPATIENT
Start: 2022-01-19 | End: 2022-01-21 | Stop reason: HOSPADM

## 2022-01-19 RX ORDER — ONDANSETRON 4 MG/1
4 TABLET, ORALLY DISINTEGRATING ORAL EVERY 6 HOURS PRN
Status: DISCONTINUED | OUTPATIENT
Start: 2022-01-19 | End: 2022-01-19

## 2022-01-19 RX ORDER — CHLORPROMAZINE HYDROCHLORIDE 50 MG/1
200 TABLET, FILM COATED ORAL 2 TIMES DAILY
Status: DISCONTINUED | OUTPATIENT
Start: 2022-01-19 | End: 2022-01-19

## 2022-01-19 RX ORDER — PANTOPRAZOLE SODIUM 40 MG/1
40 TABLET, DELAYED RELEASE ORAL 2 TIMES DAILY
Status: DISCONTINUED | OUTPATIENT
Start: 2022-01-19 | End: 2022-01-21 | Stop reason: HOSPADM

## 2022-01-19 RX ORDER — SUMATRIPTAN 5 MG/1
2 SPRAY NASAL
Status: DISCONTINUED | OUTPATIENT
Start: 2022-01-19 | End: 2022-01-19

## 2022-01-19 RX ORDER — HYDROXYZINE HYDROCHLORIDE 25 MG/1
50 TABLET, FILM COATED ORAL
Status: DISCONTINUED | OUTPATIENT
Start: 2022-01-19 | End: 2022-01-21 | Stop reason: HOSPADM

## 2022-01-19 RX ORDER — ATROPINE SULFATE 10 MG/ML
1 SOLUTION/ DROPS OPHTHALMIC 2 TIMES DAILY
Status: DISCONTINUED | OUTPATIENT
Start: 2022-01-19 | End: 2022-01-21 | Stop reason: HOSPADM

## 2022-01-19 RX ORDER — LURASIDONE HYDROCHLORIDE 20 MG/1
20 TABLET, FILM COATED ORAL DAILY
Status: DISCONTINUED | OUTPATIENT
Start: 2022-01-19 | End: 2022-01-19

## 2022-01-19 RX ORDER — FLUTICASONE PROPIONATE 50 MCG
1-2 SPRAY, SUSPENSION (ML) NASAL DAILY
Status: DISCONTINUED | OUTPATIENT
Start: 2022-01-19 | End: 2022-01-21 | Stop reason: HOSPADM

## 2022-01-19 RX ORDER — HYDROXYZINE HYDROCHLORIDE 25 MG/1
50 TABLET, FILM COATED ORAL 2 TIMES DAILY
Status: DISCONTINUED | OUTPATIENT
Start: 2022-01-19 | End: 2022-01-21 | Stop reason: HOSPADM

## 2022-01-19 RX ORDER — ZIPRASIDONE HYDROCHLORIDE 20 MG/1
20 CAPSULE ORAL EVERY 4 HOURS PRN
Status: DISCONTINUED | OUTPATIENT
Start: 2022-01-19 | End: 2022-01-21 | Stop reason: HOSPADM

## 2022-01-19 RX ORDER — ONDANSETRON 4 MG/1
4 TABLET, ORALLY DISINTEGRATING ORAL EVERY 8 HOURS PRN
Status: DISCONTINUED | OUTPATIENT
Start: 2022-01-19 | End: 2022-01-21 | Stop reason: HOSPADM

## 2022-01-19 RX ORDER — ZIPRASIDONE MESYLATE 20 MG/ML
20 INJECTION, POWDER, LYOPHILIZED, FOR SOLUTION INTRAMUSCULAR EVERY 4 HOURS PRN
Status: DISCONTINUED | OUTPATIENT
Start: 2022-01-19 | End: 2022-01-19

## 2022-01-19 RX ORDER — SUMATRIPTAN 20 MG/1
1 SPRAY NASAL
Status: DISCONTINUED | OUTPATIENT
Start: 2022-01-19 | End: 2022-01-21 | Stop reason: HOSPADM

## 2022-01-19 RX ORDER — ZIPRASIDONE MESYLATE 20 MG/ML
20 INJECTION, POWDER, LYOPHILIZED, FOR SOLUTION INTRAMUSCULAR 2 TIMES DAILY PRN
Status: DISCONTINUED | OUTPATIENT
Start: 2022-01-19 | End: 2022-01-21 | Stop reason: HOSPADM

## 2022-01-19 RX ORDER — AMOXICILLIN 250 MG
1 CAPSULE ORAL 2 TIMES DAILY PRN
Status: DISCONTINUED | OUTPATIENT
Start: 2022-01-19 | End: 2022-01-21 | Stop reason: HOSPADM

## 2022-01-19 RX ORDER — ACETAMINOPHEN 325 MG/1
650 TABLET ORAL EVERY 4 HOURS PRN
Status: DISCONTINUED | OUTPATIENT
Start: 2022-01-19 | End: 2022-01-21 | Stop reason: HOSPADM

## 2022-01-19 RX ORDER — CLONIDINE HYDROCHLORIDE 0.1 MG/1
0.1 TABLET ORAL 2 TIMES DAILY
Status: DISCONTINUED | OUTPATIENT
Start: 2022-01-19 | End: 2022-01-21 | Stop reason: HOSPADM

## 2022-01-19 RX ORDER — ATORVASTATIN CALCIUM 20 MG/1
20 TABLET, FILM COATED ORAL EVERY EVENING
Status: DISCONTINUED | OUTPATIENT
Start: 2022-01-19 | End: 2022-01-21 | Stop reason: HOSPADM

## 2022-01-19 RX ORDER — MAGNESIUM HYDROXIDE/ALUMINUM HYDROXICE/SIMETHICONE 120; 1200; 1200 MG/30ML; MG/30ML; MG/30ML
30 SUSPENSION ORAL EVERY 4 HOURS PRN
Status: DISCONTINUED | OUTPATIENT
Start: 2022-01-19 | End: 2022-01-21 | Stop reason: HOSPADM

## 2022-01-19 RX ORDER — LANOLIN ALCOHOL/MO/W.PET/CERES
3 CREAM (GRAM) TOPICAL
Status: DISCONTINUED | OUTPATIENT
Start: 2022-01-19 | End: 2022-01-21 | Stop reason: HOSPADM

## 2022-01-19 RX ORDER — CHLORPROMAZINE HYDROCHLORIDE 50 MG/1
200 TABLET, FILM COATED ORAL 2 TIMES DAILY
Status: DISCONTINUED | OUTPATIENT
Start: 2022-01-19 | End: 2022-01-20

## 2022-01-19 RX ORDER — LISINOPRIL 5 MG/1
5 TABLET ORAL DAILY
Status: DISCONTINUED | OUTPATIENT
Start: 2022-01-19 | End: 2022-01-21 | Stop reason: HOSPADM

## 2022-01-19 RX ORDER — HYDROXYZINE HYDROCHLORIDE 25 MG/1
25 TABLET, FILM COATED ORAL EVERY 4 HOURS PRN
Status: DISCONTINUED | OUTPATIENT
Start: 2022-01-19 | End: 2022-01-19

## 2022-01-19 RX ORDER — POLYETHYLENE GLYCOL 3350 17 G
2 POWDER IN PACKET (EA) ORAL
Status: DISCONTINUED | OUTPATIENT
Start: 2022-01-19 | End: 2022-01-21 | Stop reason: HOSPADM

## 2022-01-19 RX ORDER — NICOTINE 21 MG/24HR
1 PATCH, TRANSDERMAL 24 HOURS TRANSDERMAL DAILY
Status: DISCONTINUED | OUTPATIENT
Start: 2022-01-19 | End: 2022-01-19

## 2022-01-19 RX ORDER — CLONAZEPAM 0.5 MG/1
0.25 TABLET ORAL AT BEDTIME
Status: DISCONTINUED | OUTPATIENT
Start: 2022-01-19 | End: 2022-01-20

## 2022-01-19 RX ORDER — TOPIRAMATE 100 MG/1
100 TABLET, FILM COATED ORAL 2 TIMES DAILY
Status: DISCONTINUED | OUTPATIENT
Start: 2022-01-19 | End: 2022-01-21 | Stop reason: HOSPADM

## 2022-01-19 RX ORDER — LURASIDONE HYDROCHLORIDE 120 MG/1
120 TABLET, FILM COATED ORAL
Status: DISCONTINUED | OUTPATIENT
Start: 2022-01-19 | End: 2022-01-19

## 2022-01-19 RX ADMIN — HYDROXYZINE HYDROCHLORIDE 50 MG: 25 TABLET, FILM COATED ORAL at 20:08

## 2022-01-19 RX ADMIN — CLONIDINE HYDROCHLORIDE 0.1 MG: 0.1 TABLET ORAL at 12:05

## 2022-01-19 RX ADMIN — METFORMIN HYDROCHLORIDE 1000 MG: 500 TABLET, FILM COATED ORAL at 17:22

## 2022-01-19 RX ADMIN — CLONIDINE HYDROCHLORIDE 0.1 MG: 0.1 TABLET ORAL at 20:08

## 2022-01-19 RX ADMIN — FLUTICASONE PROPIONATE 1 SPRAY: 50 SPRAY, METERED NASAL at 12:06

## 2022-01-19 RX ADMIN — NICOTINE 1 PATCH: 7 PATCH, EXTENDED RELEASE TRANSDERMAL at 12:06

## 2022-01-19 RX ADMIN — CHLORPROMAZINE HYDROCHLORIDE 200 MG: 50 TABLET, FILM COATED ORAL at 21:28

## 2022-01-19 RX ADMIN — LURASIDONE HYDROCHLORIDE 140 MG: 120 TABLET, FILM COATED ORAL at 17:22

## 2022-01-19 RX ADMIN — ATORVASTATIN CALCIUM 20 MG: 20 TABLET, FILM COATED ORAL at 21:30

## 2022-01-19 RX ADMIN — METFORMIN HYDROCHLORIDE 1000 MG: 500 TABLET, FILM COATED ORAL at 12:01

## 2022-01-19 RX ADMIN — CHLORPROMAZINE HYDROCHLORIDE 200 MG: 50 TABLET, FILM COATED ORAL at 17:43

## 2022-01-19 RX ADMIN — PANTOPRAZOLE SODIUM 40 MG: 40 TABLET, DELAYED RELEASE ORAL at 20:08

## 2022-01-19 RX ADMIN — CHLORPROMAZINE HYDROCHLORIDE 200 MG: 50 TABLET, FILM COATED ORAL at 21:29

## 2022-01-19 RX ADMIN — ATROPINE SULFATE 1 DROP: 10 SOLUTION/ DROPS OPHTHALMIC at 21:31

## 2022-01-19 RX ADMIN — HYDROXYZINE HYDROCHLORIDE 50 MG: 25 TABLET, FILM COATED ORAL at 12:02

## 2022-01-19 RX ADMIN — PANTOPRAZOLE SODIUM 40 MG: 40 TABLET, DELAYED RELEASE ORAL at 12:02

## 2022-01-19 RX ADMIN — LISINOPRIL 5 MG: 5 TABLET ORAL at 12:02

## 2022-01-19 RX ADMIN — TOPIRAMATE 100 MG: 100 TABLET, FILM COATED ORAL at 12:07

## 2022-01-19 RX ADMIN — CLONAZEPAM 0.25 MG: 0.5 TABLET ORAL at 20:08

## 2022-01-19 RX ADMIN — TOPIRAMATE 100 MG: 100 TABLET, FILM COATED ORAL at 20:08

## 2022-01-19 RX ADMIN — Medication 1 PATCH: at 08:57

## 2022-01-19 ASSESSMENT — ACTIVITIES OF DAILY LIVING (ADL)
DIFFICULTY_COMMUNICATING: NO
DRESSING/BATHING_DIFFICULTY: NO
LAUNDRY: WITH SUPERVISION
FALL_HISTORY_WITHIN_LAST_SIX_MONTHS: NO
DRESS: INDEPENDENT
CONCENTRATING,_REMEMBERING_OR_MAKING_DECISIONS_DIFFICULTY: NO
HEARING_DIFFICULTY_OR_DEAF: NO
WEAR_GLASSES_OR_BLIND: NO
TOILETING_ISSUES: NO
ORAL_HYGIENE: INDEPENDENT
DOING_ERRANDS_INDEPENDENTLY_DIFFICULTY: NO
WALKING_OR_CLIMBING_STAIRS_DIFFICULTY: NO
DIFFICULTY_EATING/SWALLOWING: NO
HYGIENE/GROOMING: INDEPENDENT

## 2022-01-19 ASSESSMENT — MIFFLIN-ST. JEOR: SCORE: 1913.87

## 2022-01-19 NOTE — CONSULTS
TELEPSYCHIATRY TELEPHONE ADMISSION NOTE    Discussed with nurse .  26-year-old male with history of psychotic illness, IDD, cocaine use, psychiatric hospitalizations, presented with suicidal and homicidal ideation.  Patient has been medically cleared.  PMH significant for obesity, DM on insulin, TBI, seizure (none since 11/21).  Allergy to Haldol, lithium, Depakote, Risperdal, Seroquel, trazodone, peanuts, amphetamine.  No known serious substance withdrawal reactions.      Vital signs: AVSS. Lab findings: COVID negative. Urine drug screen: +barbiturates. Alcohol level: nil.     PLAN:    --Disposition: Admit to psychiatry under involuntary status.  Routine admission orders placed.  --Precautions: Safety/suicide.  --Medications: Confirm home medications.  Medical consult to advise on seizure and DM management.          Hector Stratton MD  Psychiatrist

## 2022-01-19 NOTE — PROGRESS NOTES
01/19/22 1524   Engagement   Intervention Group   Topic Detail sanding, staining, and selecting tile for tile box for sequencing, planning, creativity, leisure, relaxation   Attendance Did not attend   Reason for Not Attending Refused   Thought Content Perseverative   Goals addressed in session today Pt rigid in his thinking and declined to do activity. Pt interested drawing. Later pt seen playing cards with staff
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
## 2022-01-19 NOTE — PROGRESS NOTES
LEGAL:  Patient is currently on a [place] ReCommitment and [facility] in [facility].  Patient is recommitted to the [employer] of Human Services and the [facility] until [facility]; provided however, that Respondent's provisional discharge remains in effect at this time and will  on 2022, unless it has been revoked or extended before that date.     Writer notified provider and CTC.  Writer requested and received court orders for chart records.    EDUIN Nazario  2022 10:46 AM
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
## 2022-01-19 NOTE — PLAN OF CARE
Problem: Behavioral Health Plan of Care  Goal: Plan of Care Review  Outcome: Improving  Flowsheets (Taken 2022 1031)  Plan of Care Reviewed With: patient  Patient Agreement with Plan of Care: agrees    Patient blunted affect, visible and engaged. Patient denies any anxiety, depression, SI/HI. Consents for safety. Patient social, interacts with peers, attends OT group session. Patient was served his 72 hr Hold paperwork that started on 21 at 10:03 am to  on 21, Monday at 10:03 pm. Patient paces intermittently on the unit. Patient able to articulate his concerns regarding his diabetes diagnosis. No other psych behavior noted.     Pt was served his 72 h hold while in ER, PTA

## 2022-01-19 NOTE — PROGRESS NOTES
01/19/22 1049   Engagement   Intervention Group   Topic Detail Ana Lambert (get to know you game) for socializing, leisure, sequencing   Attendance Attended   Concentrated on Task 30 - 45 min   Cognition Goal-directed   Mood/Affect Pleasant   Social/Behavioral Cooperative;Engaged   Goals addressed in session today Pt talked about places he traveled to, sports and past jobs. Pt left one time and returned to finish gp

## 2022-01-19 NOTE — PHARMACY-ADMISSION MEDICATION HISTORY
Admission medication history completed at [facility]. Please see Pharmacy - Admission Medication History note from 01/15/2022.

## 2022-01-20 LAB
GLUCOSE BLDC GLUCOMTR-MCNC: 116 MG/DL (ref 70–99)
GLUCOSE BLDC GLUCOMTR-MCNC: 133 MG/DL (ref 70–99)
GLUCOSE BLDC GLUCOMTR-MCNC: 96 MG/DL (ref 70–99)

## 2022-01-20 PROCEDURE — 128N000001 HC R&B CD/MH ADULT

## 2022-01-20 PROCEDURE — 250N000013 HC RX MED GY IP 250 OP 250 PS 637

## 2022-01-20 PROCEDURE — 99232 SBSQ HOSP IP/OBS MODERATE 35: CPT

## 2022-01-20 PROCEDURE — 250N000013 HC RX MED GY IP 250 OP 250 PS 637: Performed by: NURSE PRACTITIONER

## 2022-01-20 RX ORDER — CHLORPROMAZINE HYDROCHLORIDE 100 MG/1
300 TABLET, FILM COATED ORAL AT BEDTIME
Status: DISCONTINUED | OUTPATIENT
Start: 2022-01-20 | End: 2022-01-21 | Stop reason: HOSPADM

## 2022-01-20 RX ORDER — CHLORPROMAZINE HYDROCHLORIDE 100 MG/1
200 TABLET, FILM COATED ORAL DAILY
Status: DISCONTINUED | OUTPATIENT
Start: 2022-01-20 | End: 2022-01-21 | Stop reason: HOSPADM

## 2022-01-20 RX ORDER — BENZTROPINE MESYLATE 0.5 MG/1
0.5 TABLET ORAL 2 TIMES DAILY
Status: DISCONTINUED | OUTPATIENT
Start: 2022-01-20 | End: 2022-01-21 | Stop reason: HOSPADM

## 2022-01-20 RX ORDER — DESMOPRESSIN ACETATE 0.1 MG/1
200 TABLET ORAL AT BEDTIME
Status: DISCONTINUED | OUTPATIENT
Start: 2022-01-20 | End: 2022-01-21 | Stop reason: HOSPADM

## 2022-01-20 RX ADMIN — ATROPINE SULFATE 1 DROP: 10 SOLUTION/ DROPS OPHTHALMIC at 20:27

## 2022-01-20 RX ADMIN — DESMOPRESSIN ACETATE 200 MCG: 0.1 TABLET ORAL at 22:36

## 2022-01-20 RX ADMIN — LURASIDONE HYDROCHLORIDE 140 MG: 120 TABLET, FILM COATED ORAL at 17:11

## 2022-01-20 RX ADMIN — HYDROXYZINE HYDROCHLORIDE 50 MG: 25 TABLET, FILM COATED ORAL at 08:56

## 2022-01-20 RX ADMIN — PANTOPRAZOLE SODIUM 40 MG: 40 TABLET, DELAYED RELEASE ORAL at 08:55

## 2022-01-20 RX ADMIN — CHLORPROMAZINE HYDROCHLORIDE 200 MG: 50 TABLET, FILM COATED ORAL at 14:03

## 2022-01-20 RX ADMIN — BENZTROPINE MESYLATE 0.5 MG: 0.5 TABLET ORAL at 10:17

## 2022-01-20 RX ADMIN — HYDROXYZINE HYDROCHLORIDE 50 MG: 25 TABLET, FILM COATED ORAL at 20:24

## 2022-01-20 RX ADMIN — CHLORPROMAZINE HYDROCHLORIDE 300 MG: 50 TABLET, FILM COATED ORAL at 20:25

## 2022-01-20 RX ADMIN — LISINOPRIL 5 MG: 5 TABLET ORAL at 08:55

## 2022-01-20 RX ADMIN — CLONIDINE HYDROCHLORIDE 0.1 MG: 0.1 TABLET ORAL at 08:56

## 2022-01-20 RX ADMIN — CLONIDINE HYDROCHLORIDE 0.1 MG: 0.1 TABLET ORAL at 20:24

## 2022-01-20 RX ADMIN — ATORVASTATIN CALCIUM 20 MG: 20 TABLET, FILM COATED ORAL at 20:23

## 2022-01-20 RX ADMIN — PANTOPRAZOLE SODIUM 40 MG: 40 TABLET, DELAYED RELEASE ORAL at 20:25

## 2022-01-20 RX ADMIN — METFORMIN HYDROCHLORIDE 1000 MG: 500 TABLET, FILM COATED ORAL at 08:55

## 2022-01-20 RX ADMIN — ATROPINE SULFATE 1 DROP: 10 SOLUTION/ DROPS OPHTHALMIC at 08:59

## 2022-01-20 RX ADMIN — FLUTICASONE PROPIONATE 2 SPRAY: 50 SPRAY, METERED NASAL at 08:54

## 2022-01-20 RX ADMIN — TOPIRAMATE 100 MG: 100 TABLET, FILM COATED ORAL at 20:25

## 2022-01-20 RX ADMIN — TOPIRAMATE 100 MG: 100 TABLET, FILM COATED ORAL at 08:55

## 2022-01-20 RX ADMIN — BENZTROPINE MESYLATE 0.5 MG: 0.5 TABLET ORAL at 20:23

## 2022-01-20 RX ADMIN — METFORMIN HYDROCHLORIDE 1000 MG: 500 TABLET, FILM COATED ORAL at 17:12

## 2022-01-20 RX ADMIN — NICOTINE 1 PATCH: 7 PATCH, EXTENDED RELEASE TRANSDERMAL at 10:16

## 2022-01-20 ASSESSMENT — ACTIVITIES OF DAILY LIVING (ADL)
DRESS: INDEPENDENT
ORAL_HYGIENE: INDEPENDENT
DRESS: INDEPENDENT
HYGIENE/GROOMING: INDEPENDENT
HYGIENE/GROOMING: INDEPENDENT
LAUNDRY: WITH SUPERVISION
ORAL_HYGIENE: INDEPENDENT

## 2022-01-20 NOTE — PROGRESS NOTES
Pt med compliant claims no pain. Pt denies anxiety, depression, SI, HI, and hallucinations. Pt in common area majority of shift. Pt primarily likes to sit and watch others. Pt talks with staff while sitting on periphery of common area. Pt expected to discharge back to group home tomorrow, Pt wants computer time to do music, face book, and g-mail.  said pt can only use headphones. Pt plays cards with behavioral tech toward end of shift..

## 2022-01-20 NOTE — PLAN OF CARE
"Assessment/Intervention, Care Coordination and Current Symptoms:   CTC met with patient post morning IDT meeting to discuss discharge. It was reported in morning IDT that the patient is scheduled for a pre-op appointment at [facility] on 1/24/22, with surgery of an abdominal mass on 1/27/21. Patient verified these dates and his understanding of the surgical procedure. Patient was calm, pleasant and cooperative. He denied new or worsening psych symptoms. He denies delusions and hallucinations. Patient did not appear to be delusional or responding to auditory or visual stimuli while meeting with writer. Patient appears remorseful and stated he \"regrets his behavior at the group home.\" He displayed insight into better use of his coping skills should a similar situation arise in the future, \"I want to do better next time.\"     CTC contact patient's group home and they reported that the patient could return to the  once stabilized. Patient's attending medical provider, planning discharge tomorrow 1/21/22.     CTC left VM with patient's Sohan SORENSON, regarding date/timing of pending discharge.      Discharge Plan or Goal:  Return to group home      Barriers to Discharge:  symptom stabilization, medication management, coordination of care     Referral Status:  None at this time    Legal Status:  72 - hr hold expires 1/20/22 at 0001     Timoteo Broussard MA, [facility], 1/20/2022, 1:09 PM        "
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
## 2022-01-20 NOTE — PROGRESS NOTES
[facility]    Medicine Progress Note - Hospitalist Service    Date of Admission:  1/19/2022    Assessment & Plan        Eric Fall is a 26 year old male admitted on 1/19/2022. He has a PMH of ADHD, chemical abuse, conversion disorder, fetal alcohol syndrome, MR (moderate), PTSH, schizoaffective disorder, TBI, seizures, DM. Nocturnist paged for low blood sugars, lantus is on hold.    #Diabetes mellitus- insulin dependent  - metformin 1000 mg 2 times daily with meals and insulin glargine 10 Units at bedtime, now on hold  -Blood sugars over the last 24 hours have been 86, 97, 96. Nursing staff contacted nocturnist about last evenings lantus dose with concern that blood sugars were getting too low. Will monitor blood sugars and if they start to get high, will resume lantus   - hgb A1C 5.8 on 1/17/22. Target A1C for type 2 is less then 6.5  - exam of feet: pt states he could not feel me touching his feet. Bottoms of feet dry and cracked. Recommend he applies lotion to feet at bedtime and that he checks the bottom of his feet daily for cracks, sores, foreign objects. May need to start seeing podiatry at discharge.   - consult dietician. Had a lengthy discussion on healthy carbs, sweets, sugary drinks.   -encourage smoking cessation, nicotine patch in place  - diabetic educator consult    #Hypertension  -hypertensive today 141/88  -PTA lisinopril 5 mg, if remains hypertensive will increase lisinopril  -per records, last lipid panel was in 2014. Will check with next lab draw     #Seizures  - denies any recent seizures  - states seizures are more likely when he is stressed, depressed, anxious  - continue PTA topamax     #Mesenteric mass  - is being followed by Dr Velazquez with Surgical Oncology for evaluation for biopsy/removal of mesenteric mass  - mass was not accessible by EUS, PET showed localized lesion  - plan is for open approach as the mas is close to the superior mesenteric artery  and is scheduled on 1/27/22 with Dr Velazquez. A pre-op is scheduled for 1/24/22  - if still here will complete preop assessment   -if stable to discharge so patient may have surgery, only give 60% of lantus dose night before surgery  -if not ready for discharge, surgery will have to be rescheduled            Diet: Consistent Carbohydrate Diet Moderate Consistent Carb (60 g CHO per Meal) Diet    DVT Prophylaxis: Low Risk/Ambulatory with no VTE prophylaxis indicated  Mejia Catheter: Not present  Central Lines: None  Cardiac Monitoring: None  Code Status: Full Code      Disposition Plan   Expected Discharge:  TBD   Anticipated discharge location:  Awaiting care coordination huddle  Delays:    DM2, HTN           Yohana La NP  Hospitalist Service  [facility]  Securely message with the Vocera Web Console (learn more here)  Text page via [employer] Paging/Directory         Clinically Significant Risk Factors Present on Admission             # Obesity: last Body mass index is 35.09 kg/m .      ______________________________________________________________________    Interval History   No significant events besides what is noted above     Data reviewed today: I reviewed all medications, new labs and imaging results over the last 24 hours    .Physical Exam   Vital Signs: Temp: 97.7  F (36.5  C) Temp src: Oral BP: (!) 141/88 Pulse: 98   Resp: 16 SpO2: 98 % O2 Device: None (Room air)    Weight: 217 lbs 6.4 oz

## 2022-01-20 NOTE — PROGRESS NOTES
Patient legal status is stay of committment: Recommitment with with [facility], medications Latuda, Thorazine, Abilify and Clozaril. Patient was noted to be having consistent dry cough intermittently throughout the shift.

## 2022-01-20 NOTE — PROGRESS NOTES
Before dinner as patient was getting labs drawn, patient reported his right leg feeling numb and swollen. Patient was asked to wait for labs to be done for the writer to assess. On assessment, patient was then asked if able to wiggle his toes, which he did as well as wiggle the foot. Patient reported not having any pain at this time but numbness. Patient asking to be seen by a provider and was informed message will be sent to a provider. Patient was observed walking from his room to the dinning area without difficulty and was also observed after dinner standing and walking in the hallway while engaging with peers and staff. The charge nurse is aware because patient approached the charge nurse as well. Staff will continue to monitor and offer support as needed.Srinivas Crocker RN

## 2022-01-20 NOTE — PROGRESS NOTES
01/20/22 1055   Engagement   Intervention Group   Topic Detail chair yoga, breath work, massage and meditation for wellbeing, grounding, leisure   Attendance Attended   Patient Response Was respectful   Concentrated on Task 5 - 10 min   Cognition Restless;Distractible   Mood/Affect Pleasant   Goals addressed in session today Pt engaged for the first 5 minutes and left.

## 2022-01-20 NOTE — PLAN OF CARE
Problem: Behavioral Health Plan of Care  Goal: Plan of Care Review  Outcome: No Change  Flowsheets (Taken 1/19/2022 1031 by Madeline Garsia RN)  Plan of Care Reviewed With: patient  Patient Agreement with Plan of Care: agrees   Patient is up on the unit, engaged with peers and staff. Patient denies pain, anxiety, depression and all psych symptoms. Patient' s blood sugars this shift were 86 & 97 respectively. Insulin order held by provider. Patient c/o right leg feeling numb and swollen. On assessment writer noted patient able to wiggle toes/foot and no signs of swelling. Patient was adamant about seeing a provider and was encouraged to wait a message to be sent. Patient ambulating without difficulty. Patient is med compliant, contracts for safety and offers no other concerns..Srinivas Crocker RN
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
## 2022-01-20 NOTE — CONSULTS
NUTRITION THERAPY    Consult noted to order tube feeding.  No indication that pt requires a tube feeding.  Is eating a diet and is to discharge back to group home tomorrow.  Please reconsult prn.

## 2022-01-21 VITALS
OXYGEN SATURATION: 100 % | BODY MASS INDEX: 34.94 KG/M2 | TEMPERATURE: 98.3 F | RESPIRATION RATE: 18 BRPM | DIASTOLIC BLOOD PRESSURE: 90 MMHG | SYSTOLIC BLOOD PRESSURE: 137 MMHG | WEIGHT: 217.4 LBS | HEIGHT: 66 IN | HEART RATE: 92 BPM

## 2022-01-21 LAB
GLUCOSE BLDC GLUCOMTR-MCNC: 118 MG/DL (ref 70–99)
GLUCOSE BLDC GLUCOMTR-MCNC: 80 MG/DL (ref 70–99)
SARS-COV-2 RNA RESP QL NAA+PROBE: NEGATIVE

## 2022-01-21 PROCEDURE — 250N000013 HC RX MED GY IP 250 OP 250 PS 637

## 2022-01-21 PROCEDURE — 250N000013 HC RX MED GY IP 250 OP 250 PS 637: Performed by: INTERNAL MEDICINE

## 2022-01-21 PROCEDURE — 99239 HOSP IP/OBS DSCHRG MGMT >30: CPT

## 2022-01-21 PROCEDURE — 87635 SARS-COV-2 COVID-19 AMP PRB: CPT

## 2022-01-21 RX ORDER — CLONIDINE HYDROCHLORIDE 0.1 MG/1
0.1 TABLET ORAL 2 TIMES DAILY
Qty: 60 TABLET | Refills: 0 | Status: SHIPPED | OUTPATIENT
Start: 2022-01-21

## 2022-01-21 RX ORDER — CHLORPROMAZINE HYDROCHLORIDE 200 MG/1
200 TABLET, FILM COATED ORAL DAILY
Qty: 30 TABLET | Refills: 0 | Status: ON HOLD | OUTPATIENT
Start: 2022-01-21 | End: 2024-07-04

## 2022-01-21 RX ORDER — BENZTROPINE MESYLATE 0.5 MG/1
0.5 TABLET ORAL 2 TIMES DAILY
Qty: 60 TABLET | Refills: 0 | Status: ON HOLD | OUTPATIENT
Start: 2022-01-21 | End: 2024-07-04

## 2022-01-21 RX ORDER — CHLORPROMAZINE HYDROCHLORIDE 100 MG/1
300 TABLET, FILM COATED ORAL AT BEDTIME
Qty: 30 TABLET | Refills: 0 | Status: ON HOLD | OUTPATIENT
Start: 2022-01-21 | End: 2024-07-04

## 2022-01-21 RX ORDER — LANOLIN ALCOHOL/MO/W.PET/CERES
3 CREAM (GRAM) TOPICAL
Qty: 30 TABLET | Refills: 0 | Status: SHIPPED | OUTPATIENT
Start: 2022-01-21 | End: 2022-06-04

## 2022-01-21 RX ADMIN — FLUTICASONE PROPIONATE 1 SPRAY: 50 SPRAY, METERED NASAL at 08:07

## 2022-01-21 RX ADMIN — LISINOPRIL 5 MG: 5 TABLET ORAL at 08:06

## 2022-01-21 RX ADMIN — ALUMINUM HYDROXIDE, MAGNESIUM HYDROXIDE, AND SIMETHICONE 30 ML: 200; 200; 20 SUSPENSION ORAL at 05:23

## 2022-01-21 RX ADMIN — ATROPINE SULFATE 1 DROP: 10 SOLUTION/ DROPS OPHTHALMIC at 08:07

## 2022-01-21 RX ADMIN — METFORMIN HYDROCHLORIDE 500 MG: 500 TABLET, FILM COATED ORAL at 08:06

## 2022-01-21 RX ADMIN — TOPIRAMATE 100 MG: 100 TABLET, FILM COATED ORAL at 08:06

## 2022-01-21 RX ADMIN — CLONIDINE HYDROCHLORIDE 0.1 MG: 0.1 TABLET ORAL at 08:06

## 2022-01-21 RX ADMIN — NICOTINE 1 PATCH: 7 PATCH, EXTENDED RELEASE TRANSDERMAL at 12:52

## 2022-01-21 RX ADMIN — BENZTROPINE MESYLATE 0.5 MG: 0.5 TABLET ORAL at 08:05

## 2022-01-21 RX ADMIN — PANTOPRAZOLE SODIUM 40 MG: 40 TABLET, DELAYED RELEASE ORAL at 05:24

## 2022-01-21 RX ADMIN — HYDROXYZINE HYDROCHLORIDE 50 MG: 25 TABLET, FILM COATED ORAL at 08:05

## 2022-01-21 ASSESSMENT — ACTIVITIES OF DAILY LIVING (ADL)
LAUNDRY: UNABLE TO COMPLETE
ORAL_HYGIENE: INDEPENDENT
HYGIENE/GROOMING: INDEPENDENT
DRESS: INDEPENDENT
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
## 2022-01-21 NOTE — PROGRESS NOTES
Received call about ongoing concerns in the context of recurrent hypoglycemia blood sugars have been well below one hundred and ten, patient is on metformin 1 g twice a day as well as Lantus which is on hold at this point in time it would be very reasonable to decrease the metformin to 500 mg twice a day and discontinue Lantus specially given the hemoglobin A1c of 5.8

## 2022-01-21 NOTE — CONSULTS
"Diabetes Care Consult Note  Situation:  Diabetes ed consulted for diabetic, Lantus on hold for blood sugars down trending.    Background:  Pt w/ hx of type 2 diabetes. Lives in , they give meds and check BG 1x per day. Notes -145. He was on Trulicity in the past but was taken off it because \"BG were good\". Reports he has had a low BG of 65 before,  did not give him anything sweet to eat.     Home PTA diabetes meds:  -Basaglar 10u HS  -Metformin 1000mg two times per day    Current Inpatient diabetes meds:  -Metformin 500mg two times per day (dose reduced here)    Blood Glucose Labs:  A1C: 5.8% (1/17/21)  Hemoglobin: NA  Creat: 1.07  GFR: >90  Glucose POC:   Results for MELISA WU (MRN [id number]) as of 1/21/2022 09:01   Ref. Range 1/19/2022 11:55 1/19/2022 17:15 1/19/2022 17:17 1/19/2022 20:03 1/20/2022 07:31 1/20/2022 17:05 1/20/2022 20:02 1/21/2022 06:40   GLUCOSE BY METER POCT Latest Ref Range: 70 - 99 mg/dL 114 (H)  86 97 96 116 (H) 133 (H) 80       Assessment: BG at goal this admission without insulin. Agree with stopping insulin and at discharge given A1C 5.8%. Metformin should not cause low BG, could increase back to previous dose 1000mg two times per day. Should check BG and f/u OP with PCP. If BG elevated would resume Trulicity. Will send message to PCP.     For inpatient diabetes management guidelines refer to \"Guidelines for Subcutaneous Insulin Dosing\" found in the DIAB Subcutaneous Insulin Management Adult order set.       Justine Menon RD, MONET, [facility]  Diabetes Educator  Pager: [phone]     "

## 2022-01-21 NOTE — PLAN OF CARE
Discharge plan or goal: Return to Group Home    Today's Plan: Writer and patient discussed plans for discharge back to Group [place].  Writer spoke with Group Home Director and RN, confirming patient can return today.  Their staff will pick him up at 2pm.  Patient is informed and agreeable with plan, stating he is ready for discharge.      Pt has the following outpatient appointment(s) scheduled:   Appointment Type: Medication Management (Phone appointment)  Provider: Dr. Licona  Date & Time: 4/19 at 2pm  Clinic: [facility]  Phone: [phone]  Additional Notes: Scheduling will be contacting your Group Home staff regarding scheduling earlier appointment    Pt has the following professional community support(s):   -[facility]-[phone]  [phone]    Legal Status: Commitment    Diagnosis/Procedure:   Patient Active Problem List   Diagnosis     Constipation     Anal fissure     Dysuria     Psychiatric pseudoseizure     Diabetes mellitus (H)     Foot pain, left     Penile pain     Suicidal ideation     Seizure (H)     Hip pain, bilateral     Aggression     Spells     Psychotic disorder (H)       Care Rounds Attendance:   CARA   RN   Charge RN   OT/TR  MD/CNP    Timoteo Merida, [facility], 1/21/2022, 12:15 PM

## 2022-01-21 NOTE — PLAN OF CARE
Problem: Behavioral Health Plan of Care  Goal: Plan of Care Review  1/21/2022 1522 by Tricia Lama RN  Outcome: Adequate for Discharge     Patient was pleasant and cooperative. He denied anxiety, depression, SI, HI and hallucination. He was meds compliant. He denied of pain. He contracted for safety. Pt reported not feeling good before lunch, he stated may be his blood sugar is low. Staff checked pt's BS for 118. He ate 100% of both meals plus 1400 snack. He was visible on unit, engaged with staff and selective peers. Patient discharged to his group home at 1430. Education regarding meds, activities and appointments provided to patient. All belongings sent with patient.

## 2022-01-21 NOTE — DISCHARGE INSTRUCTIONS
"Behavioral Discharge Planning and Instructions    Summary: You were admitted on 1/19/2022  due to Agressive Behaviors.  You were treated by Sanjiv Rand CNP and discharged on 1/21/22 from [facility] to [facility]    Main Diagnosis: Psychotic disorder (H)    Health Care Follow-up:   Appointment Type: Medication Management (Phone appointment)  Provider: Dr. Licona  Date & Time: 4/19 at 2pm  Clinic: [facility]  Phone: [phone]  Additional Notes: Scheduling will be contacting your Group Home staff regarding scheduling earlier appointment    Attend all scheduled appointments with your outpatient providers. Call at least 24 hours in advance if you need to reschedule an appointment to ensure continued access to your outpatient providers.     Major Treatments, Procedures and Findings:  You were provided with: a psychiatric assessment, assessed for medical stability, medication evaluation and/or management, group therapy and milieu management    Symptoms to Report: feeling more aggressive, increased confusion or mood getting worse    Early warning signs can include: sleep disturbances increased unusual thinking, such as paranoia or hearing voices    Safety and Wellness:  Take all medicines as directed.  Make no changes unless your doctor suggests them.      Follow treatment recommendations.  Refrain from alcohol and non-prescribed drugs.  If there is a concern for safety, call 911.    Resources:   Crisis Intervention: 987.502.7553 or 721-756-3792 (TTY: 227.379.9532).  Call anytime for help.  National [place] on Mental Illness (www.[place].steve.org): 532.947.8914 or 472-413-7289.  [facility] Crisis Response [phone]  Crisis text line: Text \"[place]\" to 077639. Free, confidential, 24/7.  Crisis Intervention: 647.929.7811 or 427-611-6165. Call anytime for help.     General Medication Instructions:   See your medication sheet(s) for instructions.   Take all medicines as " directed.  Make no changes unless your doctor suggests them.   Go to all your doctor visits.  Be sure to have all your required lab tests. This way, your medicines can be refilled on time.  Do not use any drugs not prescribed by your doctor.  Avoid alcohol.    Advance Directives:   Scanned document on file with [employer]?    Is document scanned?    Honoring Choices Your Rights Handout:    Was more information offered?      The Treatment team has appreciated the opportunity to work with you. If you have any questions or concerns about your recent admission, you can contact the unit which can receive your call 24 hours a day, 7 days a week. They will be able to get in touch with a Provider if needed. The unit number is [phone] .

## 2022-01-21 NOTE — PROGRESS NOTES
01/21/22 1112   Engagement   Intervention Group   Topic Detail OT Creative Expressions group-Coloring/Drawing for social engagement, symptom management, healthy distraction, creativity, and focus   Attendance Attended   Patient Response Needs reinforcement/repetition to learn materials;Accepted feedback;Asked questions and/or took notes;Expressed feelings/issues   Concentrated on Task duration of group   Cognition Distractible;Sequences task   Mood/Affect Congruent;Content   Social/Behavioral Cooperative;Engaged;Redirectable   Goals addressed in session today pt engaged in drawing activity. pt chose to copy a picture of jessica hatch for his project. pt asked for a stress ball-pt chose the earth stress ball and engaged in a discussion with staff and peers about countries and oceans

## 2022-01-21 NOTE — PLAN OF CARE
Problem: Behavioral Health Plan of Care  Goal: Plan of Care Review  Outcome: Improving  Flowsheets (Taken 1/21/2022 0900)  Plan of Care Reviewed With: patient  Patient Agreement with Plan of Care: agrees     Problem: Suicidal Behavior  Goal: Suicidal Behavior is Absent or Managed  Outcome: Improving
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
## 2022-01-21 NOTE — PLAN OF CARE
Problem: Behavioral Health Plan of Care  Goal: Plan of Care Review  Outcome: No Change  Flowsheets (Taken 1/20/2022 8521 by Nicholas Umaña RN)  Plan of Care Reviewed With: patient  Patient Agreement with Plan of Care: agrees   Patient is up on the unit, engaged with both peers and staff. Patient attended and participated in group. Patient denies pain and all psych symptoms. Patient's vitals are stable and blood sugars were 116 & 133 before dinner and HS snack respectively. Patient reports he is looking forward to discharging tomorrow but not the upcoming surgery. Writer listened, acknowledged and validated patient's feelings. Patient is otherwise pleasant, contracts for safety and offers no other concerns..Srinivas Crocker RN

## 2022-01-21 NOTE — PLAN OF CARE
Problem: Behavioral Health Plan of Care  Goal: Plan of Care Review  Outcome: Improving  Flowsheets (Taken 1/21/2022 0900)  Plan of Care Reviewed With: patient  Patient Agreement with Plan of Care: agrees

## 2022-01-22 DIAGNOSIS — Z71.89 OTHER SPECIFIED COUNSELING: ICD-10-CM

## 2022-01-24 ENCOUNTER — PATIENT OUTREACH (OUTPATIENT)
Dept: CARE COORDINATION | Facility: CLINIC | Age: 27
End: 2022-01-24
Payer: MEDICARE

## 2022-04-09 ENCOUNTER — HOSPITAL ENCOUNTER (EMERGENCY)
Facility: CLINIC | Age: 27
Discharge: LEFT BEFORE DISCHARGE | End: 2022-04-09
Payer: MEDICARE

## 2022-04-09 VITALS
DIASTOLIC BLOOD PRESSURE: 85 MMHG | SYSTOLIC BLOOD PRESSURE: 130 MMHG | OXYGEN SATURATION: 99 % | HEART RATE: 103 BPM | TEMPERATURE: 98.5 F | RESPIRATION RATE: 16 BRPM

## 2022-04-10 NOTE — ED TRIAGE NOTES
Redness and swelling around Rt eye this evening.  No drainage.  Pt denies any injury to Rt eye.  Denies vision changes.

## 2022-04-11 ENCOUNTER — HOSPITAL ENCOUNTER (EMERGENCY)
Facility: CLINIC | Age: 27
Discharge: LEFT BEFORE DISCHARGE | End: 2022-04-11
Admitting: EMERGENCY MEDICINE
Payer: MEDICARE

## 2022-04-11 VITALS
BODY MASS INDEX: 34.7 KG/M2 | OXYGEN SATURATION: 100 % | WEIGHT: 215 LBS | TEMPERATURE: 99 F | HEART RATE: 100 BPM | SYSTOLIC BLOOD PRESSURE: 136 MMHG | RESPIRATION RATE: 18 BRPM | DIASTOLIC BLOOD PRESSURE: 81 MMHG

## 2022-04-11 LAB
ALBUMIN UR-MCNC: NEGATIVE MG/DL
APPEARANCE UR: CLEAR
BILIRUB UR QL STRIP: NEGATIVE
COLOR UR AUTO: NORMAL
GLUCOSE UR STRIP-MCNC: NEGATIVE MG/DL
HGB UR QL STRIP: NEGATIVE
KETONES UR STRIP-MCNC: NEGATIVE MG/DL
LEUKOCYTE ESTERASE UR QL STRIP: NEGATIVE
NITRATE UR QL: NEGATIVE
PH UR STRIP: 7 [PH] (ref 5–7)
RBC URINE: <1 /HPF
SP GR UR STRIP: 1.01 (ref 1–1.03)
UROBILINOGEN UR STRIP-MCNC: NORMAL MG/DL
WBC URINE: <1 /HPF

## 2022-04-11 PROCEDURE — 999N000104 HC STATISTIC NO CHARGE

## 2022-04-11 PROCEDURE — 81001 URINALYSIS AUTO W/SCOPE: CPT | Performed by: EMERGENCY MEDICINE

## 2022-04-11 NOTE — ED TRIAGE NOTES
"Pt reports that he has been having a cough for a \"few months\" pt DSP is with pt and reports that they are concerned for GI symptoms. PT reports that he hasnt been able to keep anything down and diarrhea associated with constipation off and on, pt also states that he sometime has blood in stool and is having urinary frequency. PT reports that he also has a eye problem where he has darkness under his right eye with increased tearing to that eye. PT VSS and ABC's intact  "

## 2022-06-02 ENCOUNTER — HOSPITAL ENCOUNTER (EMERGENCY)
Facility: CLINIC | Age: 27
Discharge: HOME | End: 2022-06-03
Attending: EMERGENCY MEDICINE | Admitting: EMERGENCY MEDICINE
Payer: MEDICARE

## 2022-06-02 VITALS
SYSTOLIC BLOOD PRESSURE: 149 MMHG | HEART RATE: 123 BPM | BODY MASS INDEX: 34.39 KG/M2 | WEIGHT: 214 LBS | DIASTOLIC BLOOD PRESSURE: 98 MMHG | OXYGEN SATURATION: 100 % | HEIGHT: 66 IN | RESPIRATION RATE: 16 BRPM | TEMPERATURE: 99 F

## 2022-06-02 DIAGNOSIS — K59.00 CONSTIPATION, UNSPECIFIED CONSTIPATION TYPE: ICD-10-CM

## 2022-06-02 LAB
ALBUMIN UR-MCNC: NEGATIVE MG/DL
APPEARANCE UR: CLEAR
BILIRUB UR QL STRIP: NEGATIVE
COLOR UR AUTO: ABNORMAL
GLUCOSE UR STRIP-MCNC: NEGATIVE MG/DL
HGB UR QL STRIP: NEGATIVE
KETONES UR STRIP-MCNC: NEGATIVE MG/DL
LEUKOCYTE ESTERASE UR QL STRIP: NEGATIVE
MUCOUS THREADS #/AREA URNS LPF: PRESENT /LPF
NITRATE UR QL: NEGATIVE
PH UR STRIP: 6.5 [PH] (ref 5–7)
RBC URINE: <1 /HPF
SP GR UR STRIP: 1.02 (ref 1–1.03)
UROBILINOGEN UR STRIP-MCNC: NORMAL MG/DL
WBC URINE: 0 /HPF

## 2022-06-02 PROCEDURE — 99285 EMERGENCY DEPT VISIT HI MDM: CPT | Mod: 25

## 2022-06-02 PROCEDURE — 81001 URINALYSIS AUTO W/SCOPE: CPT | Performed by: EMERGENCY MEDICINE

## 2022-06-03 PROCEDURE — 250N000013 HC RX MED GY IP 250 OP 250 PS 637: Performed by: EMERGENCY MEDICINE

## 2022-06-03 RX ADMIN — MAGNESIUM CITRATE 286 ML: 1.75 LIQUID ORAL at 01:01

## 2022-06-03 ASSESSMENT — ENCOUNTER SYMPTOMS
ABDOMINAL PAIN: 1
CONSTIPATION: 1
VOMITING: 1

## 2022-06-03 NOTE — ED NOTES
Pt requesting to go home with a stool softener and follow up with his pcp, as pt is scheduled to work at 0900 and does not wish to call in.  MD made aware.

## 2022-06-03 NOTE — ED PROVIDER NOTES
"  History   Chief Complaint:  Abdominal Pain and Constipation       The history is provided by the patient.      Eric Fall is a 26 year old male with history of diabetes who presents with abdominal pain and constipation. The patient mentions he has been unable to pass gas, vomiting up any food he tries to eat, and an abdominal pain that he describes as a tightness. He was admitted at [facility] from 5/16-5/31 where he says that he was constipated the whole time. He mentions being given Senna, Milk of magnesia, and Colace without any change in his constipation.      Review of Systems   Gastrointestinal: Positive for abdominal pain, constipation and vomiting.   All other systems reviewed and are negative.      Allergies:  Haloperidol  Lithium  Olanzapine  Quetiapine  Amphetamine-Dextroamphetamine  Dextroamphetamine  Trazodone  Risperdal  Valproic Acid    Medications:  Lipitor  Thorazine  Catapres  Desmopressin  Flonase  Atarax  Zestril  Latuda  Metformin  Zofran  Protonix  Imitrex  Topamax  Lisinopril    Past Medical History:     ADHD  Adult antisocial behavior  Chemical abuse  Conversion disorder  Diabetes  Fetal alcohol syndrome  Hip dysplasia  Intermittent explosive disorder  Involuntary commitment  Mental retardation  Obesity  PTSD  Schizoaffective disorder  Seizures  TBI  Polysubstance abuse  GERD  Asthma    Past Surgical History:    Bilateral hip surgery with pin placement     Family History:    The patient denies past family history.     Social History:  Patient came from home.  Patient is accompanied in the ED.    Physical Exam     Patient Vitals for the past 24 hrs:   BP Temp Pulse Resp SpO2 Height Weight   06/02/22 2103 (!) 149/98 99  F (37.2  C) (!) 123 16 100 % 1.676 m (5' 6\") 97.1 kg (214 lb)       Physical Exam  Constitutional: Alert, attentive  HENT:    Nose: Nose normal.    Mouth/Throat: Oropharynx is clear, mucous membranes are moist   Eyes: EOM are normal.   CV: regular rate and rhythm; no " murmurs, rubs or gallups  Chest: Effort normal and breath sounds normal.   GI:  There is no tenderness. No distension. Normal bowel sounds  MSK: Normal range of motion.   Neurological: Alert, attentive  Skin: Skin is warm and dry.      Emergency Department Course     Laboratory:  Labs Ordered and Resulted from Time of ED Arrival to Time of ED Departure   ROUTINE UA WITH MICROSCOPIC REFLEX TO CULTURE - Abnormal       Result Value    Color Urine Light Yellow      Appearance Urine Clear      Glucose Urine Negative      Bilirubin Urine Negative      Ketones Urine Negative      Specific Gravity Urine 1.020      Blood Urine Negative      pH Urine 6.5      Protein Albumin Urine Negative      Urobilinogen Urine Normal      Nitrite Urine Negative      Leukocyte Esterase Urine Negative      Mucus Urine Present (*)     RBC Urine <1      WBC Urine 0        Emergency Department Course:       Reviewed:  I reviewed nursing notes, vitals, past medical history and Care Everywhere    Assessments:  2437 I obtained history and examined the patient as noted above.    I rechecked the patient and explained findings.     Interventions:  0101 Enema 286 mL Rectal    Disposition:  The patient was discharged to home.     Impression & Plan     CMS Diagnoses: None    Medical Decision Making:  This is a 26-year-old male with history of chronic constipation and significant BH history who presents for evaluation of constipation.  He has a benign abdominal exam.  He is on an aggressive home medication regimen.  He did have a bowel movement after pink lady enema here and remains with a benign abdominal exam.  He declines further work-up and potential CT.  Given chronicity of symptoms, doubt small bowel obstruction, appendicitis, etc., however these cannot be ruled out without further work-up.  Plan GoLytely for additional as needed therapy at home and primary care follow-up for recheck in 2 to 3 days.  Return precautions for pain, intractable  constipation, or any other concerns.    Diagnosis:    ICD-10-CM    1. Constipation, unspecified constipation type  K59.00        Discharge Medications:  Discharge Medication List as of 6/3/2022  1:51 AM      START taking these medications    Details   polyethylene glycol (GOLYTELY) 236 g suspension Take 240 mLs by mouth every 10 minutes as needed (constipation), Disp-4000 mL, R-0, Local Print             Scribe Disclosure:  I, Austin Phoenix, am serving as a scribe at 12:36 AM on 6/3/2022 to document services personally performed by Dinesh Wei MD based on my observations and the provider's statements to me.     I, Karthik Davis, am serving as a scribe at 1:00 AM on 6/3/2022 to document services personally performed by Dinesh Wei MD based on my observations and the provider's statements to me.       Dinesh Wei MD  06/03/22 0693

## 2022-06-03 NOTE — ED TRIAGE NOTES
"Pt states \"I havent peed or pooped in 2 weeks.\" Then reports he is able to go small amounts at a time. Abd distended and TTP. Mass removed from stomach in January - benign. Pt c/o migraine on Monday that's resolved. Vomiting for \"a couple days\".       "

## 2022-06-04 ENCOUNTER — HOSPITAL ENCOUNTER (OUTPATIENT)
Facility: CLINIC | Age: 27
Setting detail: OBSERVATION
Discharge: HOME | End: 2022-06-04
Attending: EMERGENCY MEDICINE | Admitting: INTERNAL MEDICINE
Payer: MEDICARE

## 2022-06-04 ENCOUNTER — APPOINTMENT (OUTPATIENT)
Dept: CT IMAGING | Facility: CLINIC | Age: 27
End: 2022-06-04
Attending: EMERGENCY MEDICINE
Payer: MEDICARE

## 2022-06-04 VITALS
TEMPERATURE: 96.9 F | HEART RATE: 111 BPM | DIASTOLIC BLOOD PRESSURE: 96 MMHG | SYSTOLIC BLOOD PRESSURE: 125 MMHG | OXYGEN SATURATION: 98 % | RESPIRATION RATE: 35 BRPM

## 2022-06-04 DIAGNOSIS — R11.2 NON-INTRACTABLE VOMITING WITH NAUSEA, UNSPECIFIED VOMITING TYPE: ICD-10-CM

## 2022-06-04 DIAGNOSIS — K59.00 CONSTIPATION, UNSPECIFIED CONSTIPATION TYPE: ICD-10-CM

## 2022-06-04 DIAGNOSIS — R10.84 ABDOMINAL PAIN, GENERALIZED: ICD-10-CM

## 2022-06-04 LAB
ALBUMIN SERPL-MCNC: 4.7 G/DL (ref 3.4–5)
ALP SERPL-CCNC: 98 U/L (ref 40–150)
ALT SERPL W P-5'-P-CCNC: 35 U/L (ref 0–70)
ANION GAP SERPL CALCULATED.3IONS-SCNC: 7 MMOL/L (ref 3–14)
AST SERPL W P-5'-P-CCNC: 19 U/L (ref 0–45)
BASOPHILS # BLD AUTO: 0.1 10E3/UL (ref 0–0.2)
BASOPHILS NFR BLD AUTO: 1 %
BILIRUB SERPL-MCNC: 0.7 MG/DL (ref 0.2–1.3)
BUN SERPL-MCNC: 10 MG/DL (ref 7–30)
CALCIUM SERPL-MCNC: 10.1 MG/DL (ref 8.5–10.1)
CHLORIDE BLD-SCNC: 103 MMOL/L (ref 94–109)
CO2 SERPL-SCNC: 24 MMOL/L (ref 20–32)
CREAT SERPL-MCNC: 1.06 MG/DL (ref 0.66–1.25)
EOSINOPHIL # BLD AUTO: 0.1 10E3/UL (ref 0–0.7)
EOSINOPHIL NFR BLD AUTO: 0 %
ERYTHROCYTE [DISTWIDTH] IN BLOOD BY AUTOMATED COUNT: 11.9 % (ref 10–15)
GFR SERPL CREATININE-BSD FRML MDRD: >90 ML/MIN/1.73M2
GLUCOSE BLD-MCNC: 173 MG/DL (ref 70–99)
HCT VFR BLD AUTO: 46.4 % (ref 40–53)
HGB BLD-MCNC: 15.8 G/DL (ref 13.3–17.7)
HOLD SPECIMEN: NORMAL
IMM GRANULOCYTES # BLD: 0 10E3/UL
IMM GRANULOCYTES NFR BLD: 0 %
LYMPHOCYTES # BLD AUTO: 2 10E3/UL (ref 0.8–5.3)
LYMPHOCYTES NFR BLD AUTO: 15 %
MCH RBC QN AUTO: 31.4 PG (ref 26.5–33)
MCHC RBC AUTO-ENTMCNC: 34.1 G/DL (ref 31.5–36.5)
MCV RBC AUTO: 92 FL (ref 78–100)
MONOCYTES # BLD AUTO: 0.7 10E3/UL (ref 0–1.3)
MONOCYTES NFR BLD AUTO: 5 %
NEUTROPHILS # BLD AUTO: 10.5 10E3/UL (ref 1.6–8.3)
NEUTROPHILS NFR BLD AUTO: 79 %
NRBC # BLD AUTO: 0 10E3/UL
NRBC BLD AUTO-RTO: 0 /100
PLATELET # BLD AUTO: 398 10E3/UL (ref 150–450)
POTASSIUM BLD-SCNC: 3.6 MMOL/L (ref 3.4–5.3)
PROT SERPL-MCNC: 9.1 G/DL (ref 6.8–8.8)
RBC # BLD AUTO: 5.03 10E6/UL (ref 4.4–5.9)
SODIUM SERPL-SCNC: 134 MMOL/L (ref 133–144)
TROPONIN I SERPL HS-MCNC: 4 NG/L
WBC # BLD AUTO: 13.4 10E3/UL (ref 4–11)

## 2022-06-04 PROCEDURE — G0378 HOSPITAL OBSERVATION PER HR: HCPCS

## 2022-06-04 PROCEDURE — 96374 THER/PROPH/DIAG INJ IV PUSH: CPT | Mod: 59

## 2022-06-04 PROCEDURE — 250N000011 HC RX IP 250 OP 636: Performed by: EMERGENCY MEDICINE

## 2022-06-04 PROCEDURE — 258N000003 HC RX IP 258 OP 636: Performed by: EMERGENCY MEDICINE

## 2022-06-04 PROCEDURE — 80053 COMPREHEN METABOLIC PANEL: CPT | Performed by: EMERGENCY MEDICINE

## 2022-06-04 PROCEDURE — 250N000013 HC RX MED GY IP 250 OP 250 PS 637: Performed by: EMERGENCY MEDICINE

## 2022-06-04 PROCEDURE — 96376 TX/PRO/DX INJ SAME DRUG ADON: CPT

## 2022-06-04 PROCEDURE — 93005 ELECTROCARDIOGRAM TRACING: CPT

## 2022-06-04 PROCEDURE — 99285 EMERGENCY DEPT VISIT HI MDM: CPT | Mod: 25

## 2022-06-04 PROCEDURE — 36415 COLL VENOUS BLD VENIPUNCTURE: CPT | Performed by: EMERGENCY MEDICINE

## 2022-06-04 PROCEDURE — 84484 ASSAY OF TROPONIN QUANT: CPT | Performed by: EMERGENCY MEDICINE

## 2022-06-04 PROCEDURE — 96361 HYDRATE IV INFUSION ADD-ON: CPT

## 2022-06-04 PROCEDURE — 85025 COMPLETE CBC W/AUTO DIFF WBC: CPT | Performed by: EMERGENCY MEDICINE

## 2022-06-04 PROCEDURE — 96375 TX/PRO/DX INJ NEW DRUG ADDON: CPT

## 2022-06-04 PROCEDURE — 74177 CT ABD & PELVIS W/CONTRAST: CPT | Mod: ME

## 2022-06-04 RX ORDER — ONDANSETRON 4 MG/1
4 TABLET, ORALLY DISINTEGRATING ORAL EVERY 8 HOURS PRN
Qty: 10 TABLET | Refills: 0 | Status: SHIPPED | OUTPATIENT
Start: 2022-06-04 | End: 2023-05-24

## 2022-06-04 RX ORDER — KETOROLAC TROMETHAMINE 15 MG/ML
15 INJECTION, SOLUTION INTRAMUSCULAR; INTRAVENOUS ONCE
Status: COMPLETED | OUTPATIENT
Start: 2022-06-04 | End: 2022-06-04

## 2022-06-04 RX ORDER — IOPAMIDOL 755 MG/ML
100 INJECTION, SOLUTION INTRAVASCULAR ONCE
Status: COMPLETED | OUTPATIENT
Start: 2022-06-04 | End: 2022-06-04

## 2022-06-04 RX ORDER — MAGNESIUM CARB/ALUMINUM HYDROX 105-160MG
296 TABLET,CHEWABLE ORAL ONCE
Status: COMPLETED | OUTPATIENT
Start: 2022-06-04 | End: 2022-06-04

## 2022-06-04 RX ORDER — ONDANSETRON 2 MG/ML
4 INJECTION INTRAMUSCULAR; INTRAVENOUS EVERY 30 MIN PRN
Status: DISCONTINUED | OUTPATIENT
Start: 2022-06-04 | End: 2022-06-04 | Stop reason: HOSPADM

## 2022-06-04 RX ORDER — SODIUM CHLORIDE 9 MG/ML
INJECTION, SOLUTION INTRAVENOUS CONTINUOUS
Status: DISCONTINUED | OUTPATIENT
Start: 2022-06-04 | End: 2022-06-04 | Stop reason: HOSPADM

## 2022-06-04 RX ADMIN — ONDANSETRON 4 MG: 2 INJECTION INTRAMUSCULAR; INTRAVENOUS at 16:08

## 2022-06-04 RX ADMIN — KETOROLAC TROMETHAMINE 15 MG: 15 INJECTION, SOLUTION INTRAMUSCULAR; INTRAVENOUS at 14:24

## 2022-06-04 RX ADMIN — MAGNESIUM CITRATE 296 ML: 1.75 LIQUID ORAL at 16:01

## 2022-06-04 RX ADMIN — ONDANSETRON 4 MG: 2 INJECTION INTRAMUSCULAR; INTRAVENOUS at 13:54

## 2022-06-04 RX ADMIN — IOPAMIDOL 100 ML: 755 INJECTION, SOLUTION INTRAVENOUS at 14:40

## 2022-06-04 RX ADMIN — SODIUM CHLORIDE 1000 ML: 9 INJECTION, SOLUTION INTRAVENOUS at 13:53

## 2022-06-04 ASSESSMENT — ENCOUNTER SYMPTOMS
VOMITING: 1
SHORTNESS OF BREATH: 0
NAUSEA: 1
FEVER: 0
CONSTIPATION: 1

## 2022-06-04 NOTE — ED NOTES
"[facility]  ED Nurse Handoff Report    Eric Fall is a 26 year old male   ED Chief complaint: Constipation and Nausea & Vomiting  . ED Diagnosis:   Final diagnoses:   Constipation, unspecified constipation type   Non-intractable vomiting with nausea, unspecified vomiting type   Abdominal pain, generalized     Allergies:   Allergies   Allergen Reactions     Haloperidol Other (See Comments)     Other reaction(s): Loss of Vision, Tongue Swelling  Per Pt report  \"My eyes would roll up\"       Lithium Hives, Other (See Comments), Rash and Swelling     Olanzapine Rash     Increased blood glucose  Other reaction(s): Throat Swelling/Closing     Quetiapine Hives and Swelling     Other reaction(s): Angioedema, Throat Swelling/Closing     Amphetamine-Dextroamphetamine Headache and Other (See Comments)     Felt like face was swollen  Felt like face was swollen  Felt like face was swollen       Dextroamphetamine Other (See Comments)     Other reaction(s): Headache  Felt like face was swollen  Felt like face was swollen  Felt like face was swollen  Felt like face was swollen  Felt like face was swollen  Felt like face was swollen     Trazodone Itching     Peanut (Diagnostic) Itching     Risperdal      Valproic Acid      Other reaction(s): Headache       Code Status: Full Code  Activity level - Baseline/Home:  Independent. Activity Level - Current:   Assist X 1. Lift room needed: No. Bariatric: No   Needed: No   Isolation: No. Infection: Not Applicable.     Vital Signs:   Vitals:    06/04/22 1310   BP: (!) 148/93   Pulse: (!) 126   Resp: 20   Temp: 96.9  F (36.1  C)   TempSrc: Temporal   SpO2: 98%       Cardiac Rhythm:  ,      Pain level:    Patient confused: No. Patient Falls Risk: Yes.   Elimination Status: Has voided   Patient Report / Focused Assessment:    Tests Performed /Abnormal Results:    Treatments provided:   Family Comments:   OBS brochure/video discussed/provided to patient:  No  ED " Medications:   Medications   0.9% sodium chloride BOLUS (1,000 mLs Intravenous New Bag 6/4/22 1353)     Followed by   sodium chloride 0.9% infusion (has no administration in time range)   ondansetron (ZOFRAN) injection 4 mg (4 mg Intravenous Given 6/4/22 1608)   ketorolac (TORADOL) injection 15 mg (15 mg Intravenous Given 6/4/22 1424)   iopamidol (ISOVUE-370) solution 100 mL (100 mLs Intravenous Given 6/4/22 1440)   sodium chloride (PF) 0.9% PF flush 65 mL (65 mLs Intravenous Given 6/4/22 1441)   magnesium citrate solution 296 mL (296 mLs Oral Given 6/4/22 1601)     Drips infusing:  No  For the majority of the shift, the patient's behavior Green. Interventions performed were none.    Sepsis treatment initiated: No     Patient tested for COVID 19 prior to admission: YES    ED Nurse Name/Phone Number: Los Duff RN, [phone]  4:26 PM

## 2022-06-04 NOTE — DISCHARGE INSTRUCTIONS
Continue taking the Go-Lytely constipation medication that has already been prescribed for you.  Zofran has been prescribed to help with nausea/vomiting as needed.    Discharge Instructions  Constipation  Constipation can cause severe cramping pain and your provider thinks this might be the cause of your abdominal pain (belly pain) today.  People usually recognize that they are constipated because they have difficulty having bowel movements, are not having bowel movements frequently enough, or are not having large enough bowel movements. Sometimes, especially in children or older people, you do not recognize that you are constipated until it becomes severe. The most common causes of constipation are a lack of exercise and not eating enough fruits, vegetables, and whole grains. Constipation can also be a side effect of medications, such as narcotics, or may be caused by a disease of the digestive system.    Generally, every Emergency Department visit should have a follow-up clinic visit with either a primary or a specialty clinic/provider. Please follow-up as instructed by your emergency provider today. Sometimes, chronic constipation requires further testing to determine the cause. If you are over 50 years old, you may need a colonoscopy if you have not had one before.     Return to the Emergency Department if:  Your abdominal pain worsens or does not improve after a bowel movement.  You become very weak.  You get a temperature above 102oF or as directed by your provider.  You have blood in your stools (bright red or black, tarry stools).  You keep vomiting (throwing up) or cannot drink liquids.  Your see blood when you vomit.  Your stomach gets bloated or bigger.  You have new symptoms or anything that worries you.    What can I do to help myself?  If your provider gave you a cathartic medication, like magnesium citrate or GoLytely  (polyethylene glycol), you can expect to have cramps and gas pains after taking it.  You can expect to have a number of bowel movements and even diarrhea (loose or watery stools) in the course of clearing your bowels.  You will know your bowels have been cleaned out after you pass clear liquid. The cramps and gas should let up after you have emptied your bowels. You may want to wait until morning to take this type of medication so you aren t up in the night.   Sometimes instead of cathartics, we recommend laxatives like milk of magnesia to move your bowels more slowly, or an enema to help the bowels to move. Read and follow the package directions, or follow your provider s instructions.  Once you have become very constipated, it takes time for your bowels to return to normal and you need to be very careful to prevent becoming constipated again. Take a laxative if you do not move your bowels at least every two days.     Eat foods that have a lot of fiber. Good choices are fruits, vegetables, prune juice, apple juice, and high fiber cereal. Limit dairy products such as milk and cheese, since these can make constipation worse.   Drink plenty of water.   When you feel the need to go to the bathroom, go to the bathroom. Do not hold it.  Miralax , Metamucil , Colace , Senna or fiber supplements can be used daily.  Miralax  daily is often the best choice for children.  If you were given a prescription for medicine here today, be sure to read all of the information (including the package insert) that comes with your prescription.  This will include important information about the medicine, its side effects, and any warnings that you need to know about.  The pharmacist who fills the prescription can provide more information and answer questions you may have about the medicine.  If you have questions or concerns that the pharmacist cannot address, please call or return to the Emergency Department.   Remember that you can always come back to the Emergency Department if you are not able to see your regular  provider in the amount of time listed above, if you get any new symptoms, or if there is anything that worries you.

## 2022-06-04 NOTE — ED TRIAGE NOTES
Brief Postoperative Note    Nadine Ohara  YOB: 1927  [id number]    Pre-operative Diagnosis and Procedure: 80 yo F with a cholecystostomy tube in place s/p cholecystitis. Here for drainage catheter exchange. Post-operative Diagnosis: Same    Anesthesia: Local    Estimated Blood Loss: < 10 cc    Surgeon: Mary Jane Tolliver MD    Complications: none    Specimen obtained: none    Findings: Successful drainage catheter exchange.      Mary Jane Tolliver MD   12/21/2020 1:08 PM Pt arrives with c/o abdominal pain, constipation, and nausea/vomiting. Pt lives at a group home and reports he is his own guardian. Pt reports he was in the hospital for psychiatric needs about 2 weeks ago and hasn't had a BM since then. Pt reports he was seen here in the last 1-2 days and given an enema but he hasn't been able to poop since. Pt reports he hasn't been able to keep anything down for about a week. Pt tachycardic in triage, endorses some chest tightness. Recent med change to clozaril during psych hospitalization.     Triage Assessment     Row Name 06/04/22 1311       Triage Assessment (Adult)    Airway WDL WDL       Respiratory WDL    Respiratory WDL WDL       Skin Circulation/Temperature WDL    Skin Circulation/Temperature WDL WDL       Cardiac WDL    Cardiac WDL WDL       Peripheral/Neurovascular WDL    Peripheral Neurovascular WDL WDL       Cognitive/Neuro/Behavioral WDL    Cognitive/Neuro/Behavioral WDL WDL       Eleno Coma Scale    Best Eye Response 4-->(E4) spontaneous    Best Motor Response 6-->(M6) obeys commands    Best Verbal Response 5-->(V5) oriented    Eleno Coma Scale Score 15

## 2022-06-05 DIAGNOSIS — Z71.89 OTHER SPECIFIED COUNSELING: ICD-10-CM

## 2022-06-06 ENCOUNTER — PATIENT OUTREACH (OUTPATIENT)
Dept: CARE COORDINATION | Facility: CLINIC | Age: 27
End: 2022-06-06
Payer: MEDICARE

## 2022-06-06 LAB
ATRIAL RATE - MUSE: 115 BPM
DIASTOLIC BLOOD PRESSURE - MUSE: NORMAL MMHG
INTERPRETATION ECG - MUSE: NORMAL
P AXIS - MUSE: 48 DEGREES
PR INTERVAL - MUSE: 138 MS
QRS DURATION - MUSE: 74 MS
QT - MUSE: 308 MS
QTC - MUSE: 426 MS
R AXIS - MUSE: 21 DEGREES
SYSTOLIC BLOOD PRESSURE - MUSE: NORMAL MMHG
T AXIS - MUSE: 40 DEGREES
VENTRICULAR RATE- MUSE: 115 BPM

## 2022-06-06 NOTE — PROGRESS NOTES
Clinic Care Coordination Contact    Background: Care Coordination referral placed from [facility] discharge report for reason of patient meeting criteria for a TCM outreach call by [facility] [place] team.    Assessment: Upon chart review, [facility] Team member will cancel/close the referral for TCM outreach due to reason below:    Patient has discharged to a Group home, Memory Care or Nursing Home     Per chart, patient lives in a group home.     Plan: Care Coordination referral for TCM outreach canceled.    Helga Mccloud RN  [facility], [facility]

## 2022-08-25 ENCOUNTER — APPOINTMENT (OUTPATIENT)
Dept: GENERAL RADIOLOGY | Facility: CLINIC | Age: 27
End: 2022-08-25
Attending: EMERGENCY MEDICINE
Payer: MEDICARE

## 2022-08-25 ENCOUNTER — HOSPITAL ENCOUNTER (EMERGENCY)
Facility: CLINIC | Age: 27
Discharge: HOME | End: 2022-08-25
Attending: EMERGENCY MEDICINE | Admitting: EMERGENCY MEDICINE
Payer: MEDICARE

## 2022-08-25 VITALS
SYSTOLIC BLOOD PRESSURE: 123 MMHG | TEMPERATURE: 97.2 F | HEART RATE: 118 BPM | WEIGHT: 205 LBS | OXYGEN SATURATION: 99 % | RESPIRATION RATE: 17 BRPM | BODY MASS INDEX: 32.18 KG/M2 | HEIGHT: 67 IN | DIASTOLIC BLOOD PRESSURE: 87 MMHG

## 2022-08-25 DIAGNOSIS — J20.8 VIRAL BRONCHITIS: ICD-10-CM

## 2022-08-25 DIAGNOSIS — R04.0 EPISTAXIS: ICD-10-CM

## 2022-08-25 DIAGNOSIS — J32.9 SINUSITIS, UNSPECIFIED CHRONICITY, UNSPECIFIED LOCATION: ICD-10-CM

## 2022-08-25 PROCEDURE — 94640 AIRWAY INHALATION TREATMENT: CPT

## 2022-08-25 PROCEDURE — 250N000013 HC RX MED GY IP 250 OP 250 PS 637: Performed by: EMERGENCY MEDICINE

## 2022-08-25 PROCEDURE — 71046 X-RAY EXAM CHEST 2 VIEWS: CPT

## 2022-08-25 PROCEDURE — 99283 EMERGENCY DEPT VISIT LOW MDM: CPT | Mod: 25

## 2022-08-25 PROCEDURE — 250N000009 HC RX 250: Performed by: EMERGENCY MEDICINE

## 2022-08-25 RX ORDER — OXYMETAZOLINE HYDROCHLORIDE 0.05 G/100ML
2 SPRAY NASAL 2 TIMES DAILY
Qty: 1 ML | Refills: 0 | Status: SHIPPED | OUTPATIENT
Start: 2022-08-25 | End: 2022-08-28

## 2022-08-25 RX ORDER — ALBUTEROL SULFATE 90 UG/1
4 AEROSOL, METERED RESPIRATORY (INHALATION) ONCE
Status: COMPLETED | OUTPATIENT
Start: 2022-08-25 | End: 2022-08-25

## 2022-08-25 RX ORDER — BENZONATATE 200 MG/1
200 CAPSULE ORAL 3 TIMES DAILY PRN
Qty: 21 CAPSULE | Refills: 0 | Status: ON HOLD | OUTPATIENT
Start: 2022-08-25 | End: 2024-07-04

## 2022-08-25 RX ORDER — OXYMETAZOLINE HYDROCHLORIDE 0.05 G/100ML
2 SPRAY NASAL ONCE
Status: COMPLETED | OUTPATIENT
Start: 2022-08-25 | End: 2022-08-25

## 2022-08-25 RX ORDER — BENZONATATE 200 MG/1
200 CAPSULE ORAL 3 TIMES DAILY PRN
Qty: 21 CAPSULE | Refills: 0 | Status: SHIPPED | OUTPATIENT
Start: 2022-08-25 | End: 2022-08-25

## 2022-08-25 RX ADMIN — OXYMETAZOLINE HYDROCHLORIDE 2 SPRAY: 0.05 SPRAY NASAL at 11:59

## 2022-08-25 RX ADMIN — ALBUTEROL SULFATE 4 PUFF: 90 AEROSOL, METERED RESPIRATORY (INHALATION) at 11:59

## 2022-08-25 ASSESSMENT — ENCOUNTER SYMPTOMS
COUGH: 1
SORE THROAT: 1

## 2022-08-25 NOTE — ED PROVIDER NOTES
"  History   Chief Complaint:  Cold Symptoms       HPI   Eric Fall is a 27 year old male with history of TBI, asthma, and ADHD who presents with flu-like symptoms that he has been experiencing for 4 days. He went to urgent care 2 days ago and his lab results (strep, COVID) were negative.    Complains of sinus congestion, sore throat, nonproductive cough has also had multiple nosebleeds over the last few days.    Review of Systems   HENT: Positive for congestion and sore throat.    Respiratory: Positive for cough.      Allergies:  Iodinated contrast media  Paliperidone  Divalproex  Povidone-Iodine  Haloperidol  Lithium  Olanzapine  Quetiapine  Amphetamine-Dextroamphetamine  Dextroamphetamine  Trazodone  Peanut  Risperdal  Valproic Acid    Medications:  Acetaminophen  Atorvastatin  Benztropine  Chlorpromazine  Clonidine  Desmopressin  Fluticasone nasal spray  Hydroxyzine  Lisinopril  Lurasidone  Melatonin  Metformin  Ondansetron  Pantoprazole  Polyethylene glycol  Sumatriptan  Topiramate    Past Medical History:     Anal fissure  Psychiatric pseudoseizure  Diabetes mellitus  Suicidal ideation  Seizure  Psychotic disorder  Schizophrenia  Onychomycosis  Sleep enuresis  Fetal alcohol syndrome  PTSD  Asthma  TBI  Dysphagia  Epilepsy  Chronic tachycardia  ADHD  Hyperglycemia  Polysubstance abuse  Bipolar disorder  Migraine    Past Surgical History:    Circumcision  Genital surgery  Bilateral hip surgery  Left hip surgery  Removal hardware, femur    Family History:    Father: diabetes  Mother: psychiatry, drug/alcohol    Social History:  The patient presents to the ED alone.  PCP: Jessica Wright     Physical Exam     Patient Vitals for the past 24 hrs:   BP Temp Temp src Pulse Resp SpO2 Height Weight   08/25/22 0954 123/87 97.2  F (36.2  C) Temporal 118 17 99 % 1.702 m (5' 7\") 93 kg (205 lb)       Physical Exam    HEENT:         Normal conjunctiva, No  discharge           R TM normal, external ear and EAC " normal         L  TM normal, external ear and EAC normal           Posterior oropharynx:               Yes erythema,               No exudates,               Tonsillar hypertrophy - no              uvula midline - Yes    Neck: no adenopathy      Lungs:     clear to auscultation    Heart: Noted to be tachycardic on triage vitals, regular rate on my examination, no m/r/g, ppi    Neuro: alert, no focal gross focal deficits    Psych: Normal mood and affect            Emergency Department Course     Imaging:  Chest XR,  PA & LAT   Final Result   IMPRESSION: No focal airspace opacities, pleural effusion or   pneumothorax. The cardiac and mediastinal silhouettes are normal.   Curved lucency projecting over the mid trachea is favored to be due to   superimposed soft tissue shadows.      PHOEBE FRAUSTO MD            SYSTEM ID:  [id number]        Report per radiology    Emergency Department Course:     Reviewed:  I reviewed nursing notes, vitals, past medical history and Care Everywhere    Assessments:  1145 I obtained history and examined the patient as noted above.     Interventions:  1159 Albuterol 4 puff inhalation  1159 Afrin 2 spray nasal    Disposition:  The patient was discharged to home.     Impression & Plan     Medical Decision Making:  \27-year-old male here with upper respiratory tract infectious symptoms.  No hypoxia no significant increased work of breathing.  Noted to be tachycardic on triage vitals he is a regular rate on my examination.  I see no evidence of peritonsillar abscess, otitis media etc.  Chest radiograph shows no lobar pneumonia.  2 days ago had COVID and strep testing which was negative by his report.  I do not feel we need to repeat those.      Diagnosis:    ICD-10-CM    1. Viral bronchitis  J20.8    2. Epistaxis  R04.0    3. Sinusitis, unspecified chronicity, unspecified location  J32.9        Discharge Medications:  Discharge Medication List as of 8/25/2022 12:50 PM      START taking these  medications    Details   oxymetazoline (AFRIN NASAL SPRAY) 0.05 % nasal spray Spray 2 sprays in nostril 2 times daily for 3 days, Disp-1 mL, R-0, E-PrescribeNo drip 12 hour formula please             Scribe Disclosure:  I, Mukesh Casanova, am serving as a scribe at 12:18 PM on 8/25/2022 to document services personally performed by Abdirizak Galvan MD based on my observations and the provider's statements to me.        Abdirizak Galvan MD  08/25/22 0588

## 2022-08-25 NOTE — ED TRIAGE NOTES
ABCs intact. Pt lives in a . Pt c/o cough, nasal congestion, sore throat x 4 days. Pt BIBA. Pt went to urgent care 2 days ago and had a negative strep and covid. Pt has been having epistaxis x 4 days ago. C/o n/v. Declines tylenol and zofran at this time.

## 2022-10-29 ENCOUNTER — HOSPITAL ENCOUNTER (EMERGENCY)
Facility: CLINIC | Age: 27
Discharge: HOME | End: 2022-10-29
Attending: PHYSICIAN ASSISTANT | Admitting: PHYSICIAN ASSISTANT
Payer: MEDICARE

## 2022-10-29 ENCOUNTER — APPOINTMENT (OUTPATIENT)
Dept: GENERAL RADIOLOGY | Facility: CLINIC | Age: 27
End: 2022-10-29
Attending: PHYSICIAN ASSISTANT
Payer: MEDICARE

## 2022-10-29 VITALS
SYSTOLIC BLOOD PRESSURE: 127 MMHG | RESPIRATION RATE: 18 BRPM | HEART RATE: 125 BPM | OXYGEN SATURATION: 97 % | TEMPERATURE: 98.1 F | DIASTOLIC BLOOD PRESSURE: 81 MMHG

## 2022-10-29 DIAGNOSIS — M25.552 HIP PAIN, LEFT: ICD-10-CM

## 2022-10-29 PROCEDURE — 73502 X-RAY EXAM HIP UNI 2-3 VIEWS: CPT

## 2022-10-29 PROCEDURE — 250N000013 HC RX MED GY IP 250 OP 250 PS 637: Performed by: PHYSICIAN ASSISTANT

## 2022-10-29 PROCEDURE — 99283 EMERGENCY DEPT VISIT LOW MDM: CPT

## 2022-10-29 RX ORDER — HYDROCODONE BITARTRATE AND ACETAMINOPHEN 5; 325 MG/1; MG/1
1 TABLET ORAL ONCE
Status: COMPLETED | OUTPATIENT
Start: 2022-10-29 | End: 2022-10-29

## 2022-10-29 RX ADMIN — HYDROCODONE BITARTRATE AND ACETAMINOPHEN 1 TABLET: 5; 325 TABLET ORAL at 19:49

## 2022-10-29 ASSESSMENT — ENCOUNTER SYMPTOMS: ARTHRALGIAS: 1

## 2022-10-29 ASSESSMENT — ACTIVITIES OF DAILY LIVING (ADL): ADLS_ACUITY_SCORE: 35

## 2022-10-29 NOTE — ED TRIAGE NOTES
Patient has ongoing issues with his left hip, patient is suppose to have a hip replacement at [place] but was told if the pain got worse to come to the ED and the ED would arrange surgery for him. Patient rates his pain a 10/10. Patient has not seen a orthopedic provider.      Triage Assessment     Row Name 10/29/22 2358       Triage Assessment (Adult)    Airway WDL WDL       Respiratory WDL    Respiratory WDL WDL       Skin Circulation/Temperature WDL    Skin Circulation/Temperature WDL WDL       Cardiac WDL    Cardiac WDL WDL       Peripheral/Neurovascular WDL    Peripheral Neurovascular WDL WDL       Cognitive/Neuro/Behavioral WDL    Cognitive/Neuro/Behavioral WDL WDL

## 2022-10-29 NOTE — LETTER
October 29, 2022      To Whom It May Concern:      Eric Fall was seen in our Emergency Department today, 10/29/22.  I expect his condition to improve over the next 2 days.  He may return to work when improved.    Sincerely,        PAM COLIN PA-C

## 2022-10-30 NOTE — DISCHARGE INSTRUCTIONS
You were seen in the Emergency Department for left hip pain. Your xray shows no fractures. Call you primary care provider or orthopedic provider on Monday for follow-up. The phone number for [facility] is also provided. For pain, you may take Tylenol 1000 mg or Ibuprofen 800 mg every 6 hours as needed. Rest your hip as much as possible. For new or worsening symptoms, return to Emergency Department.

## 2022-10-30 NOTE — ED PROVIDER NOTES
History     Chief Complaint:  Hip Pain       HPI   Eric Fall is a 27 year old male with PMH of hip osteoarthritis, DM, TBI, seizures who presents to ED for evaluation of left hip pain. Patient has chronic OA in left hip seen last on CT from June 2022. Patient states he is trying to get hip replacement but has been unable to schedule with his orthopedist at [place]. Patient states he has constant pain that is keeping him up at night and pain with ambulation. Takes Tylenol without relief. Patient is able to walk. Admits to recent fall. No bowel or bladder changes. No recent fevers.    ROS:  Review of Systems   Musculoskeletal: Positive for arthralgias (left hip pain).   All other systems reviewed and are negative.    Allergies:  Haloperidol  Lithium  Olanzapine  Quetiapine  Amphetamine-Dextroamphetamine  Dextroamphetamine  Trazodone  Adderall [Amphetamine Salt Combo]  Haldol Decanoate [Haloperidol]  Peanut (Diagnostic)  Peanut [Peanut Oil]  Risperdal  Risperdal [Risperidone]  Seroquel [Quetiapine]  Valproic Acid  Zyprexa [Olanzapine]     Medications:    acetaminophen (TYLENOL) 500 MG tablet  albuterol (PROVENTIL HFA;VENTOLIN HFA) 90 mcg/actuation inhaler  atorvastatin (LIPITOR) 20 MG tablet  atropine 1 % ophthalmic solution  benzonatate (TESSALON) 200 MG capsule  benztropine (COGENTIN) 0.5 MG tablet  chlorproMAZINE (THORAZINE) 100 MG tablet  chlorproMAZINE 200 MG TABS  cloNIDine (CATAPRES) 0.1 MG tablet  desmopressin (DDAVP) 0.2 MG tablet  fluticasone (FLONASE) 50 MCG/ACT nasal spray  gabapentin (NEURONTIN) 300 MG capsule  hydrOXYzine (ATARAX) 50 MG tablet  lisinopril (ZESTRIL) 5 MG tablet  lurasidone (LATUDA) 120 MG TABS tablet  lurasidone (LATUDA) 20 MG TABS tablet  melatonin 5 MG tablet  metFORMIN (GLUCOPHAGE) 500 MG tablet  ondansetron (ZOFRAN ODT) 4 MG ODT tab  pantoprazole (PROTONIX) 40 MG EC tablet  phenytoin (DILANTIN) 100 MG ER capsule  polyethylene glycol (GOLYTELY) 236 g suspension  SUMAtriptan  (IMITREX) 5 MG/ACT nasal spray  SUMAtriptan (IMITREX) 50 MG tablet  topiramate (TOPAMAX) 100 MG tablet  traZODone (DESYREL) 100 MG tablet        Past Medical History:    Past Medical History:   Diagnosis Date     ADHD (attention deficit hyperactivity disorder)      Adult antisocial behavior      Chemical abuse (H)      Conversion disorder 2011     Diabetes mellitus (H)      Fetal alcohol syndrome      Hip dysplasia, congenital      Intermittent explosive disorder      Involuntary commitment      MR (mental retardation), moderate      Obesity      PTSD (post-traumatic stress disorder)      Schizoaffective disorder, bipolar type (H)      Seizures (H) 11/2021     TBI (traumatic brain injury) 2009       Past Surgical History:    Past Surgical History:   Procedure Laterality Date     HIP SURGERY  2010 and 2011    SCFE     ORTHOPEDIC SURGERY  2010    Bilateral hip surgery with pin placement, [facility]     ORTHOPEDIC SURGERY  3/2015    L hip        Family History:    family history includes Alcoholism in his mother.    Social History:   reports that he has never smoked. He has never used smokeless tobacco. He reports that he does not drink alcohol and does not use drugs.  PCP: Jessica Wright     Physical Exam     Patient Vitals for the past 24 hrs:   BP Temp Temp src Pulse Resp SpO2   10/29/22 1815 127/81 98.1  F (36.7  C) Oral (!) 125 18 97 %        Physical Exam  Constitutional: Alert, attentive, GCS 15  HENT:    Nose: Nose normal.    Mouth/Throat: Oropharynx is clear, mucous membranes are moist   Eyes: EOM are normal.   CV: regular rate and rhythm; no murmurs, rubs or gallups  Chest: Effort normal and breath sounds normal.   GI:  There is no tenderness. No distension. Normal bowel sounds  MSK: Tenderness with left hip flexion and extension. Pain located along left inguinal area. No crepitus or deformity.  Neurological: Alert, attentive. Normal gait.  Skin: Skin is warm and dry. No rash.      Emergency  Department Course   ECG:  ECG results from 06/04/22   EKG 12-lead, tracing only     Value    Systolic Blood Pressure     Diastolic Blood Pressure     Ventricular Rate 115    Atrial Rate 115    ID Interval 138    QRS Duration 74        QTc 426    P Axis 48    R AXIS 21    T Axis 40    Interpretation ECG      Sinus tachycardia  Otherwise normal ECG  When compared with ECG of 14-NOV-2021 21:38,  No significant change was found  Confirmed by - EMERGENCY ROOM, PHYSICIAN (Thai),  FAVIO LENTZ (Wu) on 6/6/2022 6:41:21 AM         Imaging:  XR Pelvis w Hip Left 1 View   Final Result   IMPRESSION: No acute fracture or dislocation. Moderate degenerative changes in both hips. Cannulated screw fixation of the right femoral neck. Prior cannulated screw fixation with subsequent hardware removal in the left femoral neck. Mild chronic    deformity of both femoral heads.         Report per radiology    Emergency Department Course:    Reviewed:  I reviewed nursing notes, vitals, past medical history and Care Everywhere    Assessments:  1850 I obtained history and examined the patient as noted above.   2040 I rechecked the patient and explained imaging findings.  Interventions:  Medications   HYDROcodone-acetaminophen (NORCO) 5-325 MG per tablet 1 tablet (1 tablet Oral Given 10/29/22 1949)        Disposition:  The patient was discharged to home.     Impression & Plan    CMS Diagnoses: None    Medical Decision Making:  Patient is a well-appearing 26 yo M who presents for left hip pain in the context of chronic osteoarthritis of bilateral hips. Previous surgery of right hip. Patient's main concern is left hip pain today. HR is slightly tachycardic at 125 but this consistent with prior vitals. Physical examination reveals pain with both left hip flexion and extension. Differential diagnosis includes hip fracture, hip dislocation, osteomyelitis.  Imaging of hip obtained and shows degenerative changes in bilateral hips but  no acute findings. Results reviewed and discussed with patient. At this time, patient's symptoms are consistent with chronic degenerative changes to left hip. I suspect he will eventually need hip surgery but there are no indications for emergent referral to orthopedic service today. No fever or evidence of infection. No further imaging or laboratory work indicated today. Patient had pain relief with [place] and has ride home. Patient was given contact information for both [facility] and his orthopedist at [place]. Supportive cares and return precautions to ED discussed. Patient expressed understanding of plan and was ready for discharge.    Diagnosis:    ICD-10-CM    1. Hip pain, left  M25.552            Discharge Medications:  New Prescriptions    No medications on file        10/29/2022   Jocelyne Hernandez PA-C [place], Emily, PA-C  10/29/22 2051

## 2023-01-27 ENCOUNTER — APPOINTMENT (OUTPATIENT)
Dept: ULTRASOUND IMAGING | Facility: CLINIC | Age: 28
End: 2023-01-27
Attending: EMERGENCY MEDICINE
Payer: MEDICARE

## 2023-01-27 ENCOUNTER — HOSPITAL ENCOUNTER (EMERGENCY)
Facility: CLINIC | Age: 28
Discharge: HOME | End: 2023-01-27
Attending: EMERGENCY MEDICINE | Admitting: EMERGENCY MEDICINE
Payer: MEDICARE

## 2023-01-27 ENCOUNTER — APPOINTMENT (OUTPATIENT)
Dept: GENERAL RADIOLOGY | Facility: CLINIC | Age: 28
End: 2023-01-27
Attending: EMERGENCY MEDICINE
Payer: MEDICARE

## 2023-01-27 VITALS
TEMPERATURE: 97 F | OXYGEN SATURATION: 100 % | SYSTOLIC BLOOD PRESSURE: 122 MMHG | RESPIRATION RATE: 16 BRPM | HEART RATE: 118 BPM | DIASTOLIC BLOOD PRESSURE: 91 MMHG

## 2023-01-27 DIAGNOSIS — M79.89 LEFT LEG SWELLING: ICD-10-CM

## 2023-01-27 LAB
ANION GAP SERPL CALCULATED.3IONS-SCNC: 12 MMOL/L (ref 7–15)
BASOPHILS # BLD AUTO: 0 10E3/UL (ref 0–0.2)
BASOPHILS NFR BLD AUTO: 1 %
BUN SERPL-MCNC: 13.5 MG/DL (ref 6–20)
CALCIUM SERPL-MCNC: 8.9 MG/DL (ref 8.6–10)
CHLORIDE SERPL-SCNC: 103 MMOL/L (ref 98–107)
CREAT SERPL-MCNC: 0.8 MG/DL (ref 0.67–1.17)
DEPRECATED HCO3 PLAS-SCNC: 20 MMOL/L (ref 22–29)
EOSINOPHIL # BLD AUTO: 0.3 10E3/UL (ref 0–0.7)
EOSINOPHIL NFR BLD AUTO: 4 %
ERYTHROCYTE [DISTWIDTH] IN BLOOD BY AUTOMATED COUNT: 11.7 % (ref 10–15)
GFR SERPL CREATININE-BSD FRML MDRD: >90 ML/MIN/1.73M2
GLUCOSE SERPL-MCNC: 143 MG/DL (ref 70–99)
HCT VFR BLD AUTO: 34.4 % (ref 40–53)
HGB BLD-MCNC: 11.5 G/DL (ref 13.3–17.7)
IMM GRANULOCYTES # BLD: 0.1 10E3/UL
IMM GRANULOCYTES NFR BLD: 1 %
LYMPHOCYTES # BLD AUTO: 1.9 10E3/UL (ref 0.8–5.3)
LYMPHOCYTES NFR BLD AUTO: 24 %
MCH RBC QN AUTO: 32.8 PG (ref 26.5–33)
MCHC RBC AUTO-ENTMCNC: 33.4 G/DL (ref 31.5–36.5)
MCV RBC AUTO: 98 FL (ref 78–100)
MONOCYTES # BLD AUTO: 0.6 10E3/UL (ref 0–1.3)
MONOCYTES NFR BLD AUTO: 7 %
NEUTROPHILS # BLD AUTO: 5.2 10E3/UL (ref 1.6–8.3)
NEUTROPHILS NFR BLD AUTO: 63 %
NRBC # BLD AUTO: 0 10E3/UL
NRBC BLD AUTO-RTO: 0 /100
PLATELET # BLD AUTO: 304 10E3/UL (ref 150–450)
POTASSIUM SERPL-SCNC: 4.4 MMOL/L (ref 3.4–5.3)
RBC # BLD AUTO: 3.51 10E6/UL (ref 4.4–5.9)
SODIUM SERPL-SCNC: 135 MMOL/L (ref 136–145)
WBC # BLD AUTO: 8.1 10E3/UL (ref 4–11)

## 2023-01-27 PROCEDURE — 93971 EXTREMITY STUDY: CPT | Mod: LT

## 2023-01-27 PROCEDURE — 85025 COMPLETE CBC W/AUTO DIFF WBC: CPT | Performed by: EMERGENCY MEDICINE

## 2023-01-27 PROCEDURE — 36415 COLL VENOUS BLD VENIPUNCTURE: CPT | Performed by: EMERGENCY MEDICINE

## 2023-01-27 PROCEDURE — 82310 ASSAY OF CALCIUM: CPT | Performed by: EMERGENCY MEDICINE

## 2023-01-27 PROCEDURE — 73552 X-RAY EXAM OF FEMUR 2/>: CPT | Mod: LT

## 2023-01-27 PROCEDURE — 99285 EMERGENCY DEPT VISIT HI MDM: CPT | Mod: 25

## 2023-01-27 ASSESSMENT — ACTIVITIES OF DAILY LIVING (ADL): ADLS_ACUITY_SCORE: 37

## 2023-01-28 NOTE — ED PROVIDER NOTES
History     Chief Complaint:  Leg Swelling and Leg Pain       HPI   Eric Fall is a 27 year old male with a history of left hip osteoarthritis, seizure disorder, schizophrenia who presents with left lower leg swelling 1 week after left total hip arthroplasty.  Patient denies fever shortness of breath or chest pain.  He believes the left thigh is more swollen than the right.  Patient presents from group home with group home staff.        Review of External Notes: orthopedic preop and op note from 1/20/23     ROS:  Review of Systems  A 10 point ROS was obtained and negative except as noted here and in HPI    Allergies:  Haloperidol  Lithium  Olanzapine  Quetiapine  Amphetamine-Dextroamphetamine  Dextroamphetamine  Trazodone  Adderall [Amphetamine Salt Combo]  Haldol Decanoate [Haloperidol]  Peanut (Diagnostic)  Peanut [Peanut Oil]  Risperdal  Risperdal [Risperidone]  Seroquel [Quetiapine]  Valproic Acid  Zyprexa [Olanzapine]     Medications:    acetaminophen (TYLENOL) 500 MG tablet  albuterol (PROVENTIL HFA;VENTOLIN HFA) 90 mcg/actuation inhaler  atorvastatin (LIPITOR) 20 MG tablet  atropine 1 % ophthalmic solution  benzonatate (TESSALON) 200 MG capsule  benztropine (COGENTIN) 0.5 MG tablet  chlorproMAZINE (THORAZINE) 100 MG tablet  chlorproMAZINE 200 MG TABS  cloNIDine (CATAPRES) 0.1 MG tablet  desmopressin (DDAVP) 0.2 MG tablet  fluticasone (FLONASE) 50 MCG/ACT nasal spray  gabapentin (NEURONTIN) 300 MG capsule  hydrOXYzine (ATARAX) 50 MG tablet  lisinopril (ZESTRIL) 5 MG tablet  lurasidone (LATUDA) 120 MG TABS tablet  lurasidone (LATUDA) 20 MG TABS tablet  melatonin 5 MG tablet  metFORMIN (GLUCOPHAGE) 500 MG tablet  ondansetron (ZOFRAN ODT) 4 MG ODT tab  pantoprazole (PROTONIX) 40 MG EC tablet  phenytoin (DILANTIN) 100 MG ER capsule  polyethylene glycol (GOLYTELY) 236 g suspension  SUMAtriptan (IMITREX) 5 MG/ACT nasal spray  SUMAtriptan (IMITREX) 50 MG tablet  topiramate (TOPAMAX) 100 MG  tablet  traZODone (DESYREL) 100 MG tablet        Past Medical History:    Past Medical History:   Diagnosis Date     ADHD (attention deficit hyperactivity disorder)      Adult antisocial behavior      Chemical abuse (H)      Conversion disorder 2011     Diabetes mellitus (H)      Fetal alcohol syndrome      Hip dysplasia, congenital      Intermittent explosive disorder      Involuntary commitment      MR (mental retardation), moderate      Obesity      PTSD (post-traumatic stress disorder)      Schizoaffective disorder, bipolar type (H)      Seizures (H) 11/2021     TBI (traumatic brain injury) 2009       Past Surgical History:    Past Surgical History:   Procedure Laterality Date     HIP SURGERY  2010 and 2011    SCFE     ORTHOPEDIC SURGERY  2010    [facility]     ORTHOPEDIC SURGERY  3/2015    L hip        Family History:    family history includes Alcoholism in his mother.    Social History:   reports that he has never smoked. He has never used smokeless tobacco. He reports that he does not drink alcohol and does not use drugs.  PCP: Jessica Wright     Physical Exam     Patient Vitals for the past 24 hrs:   BP Temp Temp src Pulse Resp SpO2   01/27/23 2041 (!) 122/91 97  F (36.1  C) Temporal 118 16 100 %        Physical Exam  VS: Reviewed per above  HENT: normal speech  EYES: sclera anicteric  CV: Rate as noted, regular rhythm.   RESP: Effort normal. Breath sounds are normal bilaterally.  NEURO: Alert, moving all extremities, sensation intact to light touch in left lower extremity.  Moving left lower extremity freely.  MSK: No deformity of the extremities, left lower extremity swelling compared to the right.  Left VIKY surgical incision site is clean dry and intact without surrounding redness or warmth.  Intact left DP pulse.  Left lower extremity compartments are soft.  SKIN: Warm and dry      Emergency Department Course       Imaging:  XR Femur Left 2 Views    Preliminary Result   IMPRESSION: Interval postoperative changes of left hip arthroplasty. Hardware is well seated with good alignment. Small amount of postoperative soft tissue gas.      US Lower Extremity Venous Duplex Left   Final Result   IMPRESSION:   1.  No deep venous thrombosis in the left lower extremity.         Report per radiology    Laboratory:  Labs Ordered and Resulted from Time of ED Arrival to Time of ED Departure   BASIC METABOLIC PANEL - Abnormal       Result Value    Sodium 135 (*)     Potassium 4.4      Chloride 103      Carbon Dioxide (CO2) 20 (*)     Anion Gap 12      Urea Nitrogen 13.5      Creatinine 0.80      Calcium 8.9      Glucose 143 (*)     GFR Estimate >90     CBC WITH PLATELETS AND DIFFERENTIAL - Abnormal    WBC Count 8.1      RBC Count 3.51 (*)     Hemoglobin 11.5 (*)     Hematocrit 34.4 (*)     MCV 98      MCH 32.8      MCHC 33.4      RDW 11.7      Platelet Count 304      % Neutrophils 63      % Lymphocytes 24      % Monocytes 7      % Eosinophils 4      % Basophils 1      % Immature Granulocytes 1      NRBCs per 100 WBC 0      Absolute Neutrophils 5.2      Absolute Lymphocytes 1.9      Absolute Monocytes 0.6      Absolute Eosinophils 0.3      Absolute Basophils 0.0      Absolute Immature Granulocytes 0.1      Absolute NRBCs 0.0          Emergency Department Course & Assessments:             Interventions:  Medications - No data to display     Independent Interpretation (X-rays, CTs, rhythm strip):  Upon review of the femur x-ray, I do not see evidence of fracture or dislocation or hardware malposition.             Disposition:  The patient was discharged to home.     Impression & Plan        Medical Decision Making:  Patient presents to the ER for evaluation of left lower leg swelling 1 week after left total hip arthroplasty.  On arrival he has low-grade regular tachycardia.  There are no clinical signs of compartment syndrome or limb ischemia or surgical site infection.   Ultrasound does not show DVT.  X-ray on my interpretation does not show fracture or dislocation or hardware malposition.  Radiology felt there was a small mount of postoperative soft tissue gas.  I agree that this is likely postoperative in nature and I do not appreciate clinical signs of necrotizing soft tissue infectious process.  Labs show anemia, about 2 points lower than preoperatively.  No indication for emergent transfusion.  At this time it seems that the cause of swelling is most likely related to recent orthopedic procedure, but patient will need close orthopedic follow-up next week to monitor his edema further.  Return precautions discussed with patient and his group home nurse over the phone prior to discharge.    Diagnosis:    ICD-10-CM    1. Left leg swelling  M79.89            Discharge Medications:  New Prescriptions    No medications on file           Denis Kessler MD  01/27/23 [id number]

## 2023-01-28 NOTE — DISCHARGE INSTRUCTIONS
The ultrasound did not show blood clot in your left leg.  The x-ray did not show obvious fracture or complication regarding the surgical hardware.  It is possible you have some swelling related to your recent surgery.  Follow-up with the orthopedic surgeon next week for ongoing evaluation.  Return for fevers, new numbness or weakness in the leg or escalating pain.

## 2023-01-28 NOTE — ED NOTES
Pt staff member left - stated he can come back Monday to get patient and that patient would like to stay here. Informed staff member that if there are no findings warranting admission he will be discharged. Staff member provided multiple phone numbers to call to arrange [facility]. The  phone number: [phone]. The house phone number: [phone]. The nursing line: [phone].

## 2023-01-28 NOTE — ED TRIAGE NOTES
Recent left hip surgery. Patient complains of left thigh swelling and left calf swelling and pain. Patient resides in group home. Nurse from [facility] concerned that patient has blood clot so sent him to the ER for evaluation. Denies chest pain, shortness of breath.

## 2023-05-24 ENCOUNTER — APPOINTMENT (OUTPATIENT)
Dept: GENERAL RADIOLOGY | Facility: CLINIC | Age: 28
End: 2023-05-24
Attending: PHYSICIAN ASSISTANT
Payer: MEDICARE

## 2023-05-24 ENCOUNTER — HOSPITAL ENCOUNTER (EMERGENCY)
Facility: CLINIC | Age: 28
Discharge: HOME | End: 2023-05-24
Attending: PHYSICIAN ASSISTANT | Admitting: PHYSICIAN ASSISTANT
Payer: MEDICARE

## 2023-05-24 ENCOUNTER — APPOINTMENT (OUTPATIENT)
Dept: CT IMAGING | Facility: CLINIC | Age: 28
End: 2023-05-24
Attending: PHYSICIAN ASSISTANT
Payer: MEDICARE

## 2023-05-24 VITALS
DIASTOLIC BLOOD PRESSURE: 70 MMHG | OXYGEN SATURATION: 100 % | SYSTOLIC BLOOD PRESSURE: 126 MMHG | HEART RATE: 120 BPM | TEMPERATURE: 100.1 F | RESPIRATION RATE: 22 BRPM

## 2023-05-24 DIAGNOSIS — R05.9 COUGH: ICD-10-CM

## 2023-05-24 DIAGNOSIS — R07.9 CHEST PAIN: ICD-10-CM

## 2023-05-24 DIAGNOSIS — J06.9 URI (UPPER RESPIRATORY INFECTION): ICD-10-CM

## 2023-05-24 DIAGNOSIS — R10.84 ABDOMINAL PAIN, GENERALIZED: ICD-10-CM

## 2023-05-24 DIAGNOSIS — R11.2 NAUSEA AND VOMITING: ICD-10-CM

## 2023-05-24 DIAGNOSIS — R00.0 TACHYCARDIA: ICD-10-CM

## 2023-05-24 LAB
ALBUMIN SERPL BCG-MCNC: 4.6 G/DL (ref 3.5–5.2)
ALBUMIN UR-MCNC: NEGATIVE MG/DL
ALP SERPL-CCNC: 110 U/L (ref 40–129)
ALT SERPL W P-5'-P-CCNC: 27 U/L (ref 10–50)
ANION GAP SERPL CALCULATED.3IONS-SCNC: 14 MMOL/L (ref 7–15)
APPEARANCE UR: CLEAR
AST SERPL W P-5'-P-CCNC: 26 U/L (ref 10–50)
ATRIAL RATE - MUSE: 113 BPM
BASOPHILS # BLD AUTO: 0.1 10E3/UL (ref 0–0.2)
BASOPHILS NFR BLD AUTO: 1 %
BILIRUB DIRECT SERPL-MCNC: <0.2 MG/DL (ref 0–0.3)
BILIRUB SERPL-MCNC: 0.3 MG/DL
BILIRUB UR QL STRIP: NEGATIVE
BUN SERPL-MCNC: 9.2 MG/DL (ref 6–20)
CALCIUM SERPL-MCNC: 9.4 MG/DL (ref 8.6–10)
CHLORIDE SERPL-SCNC: 103 MMOL/L (ref 98–107)
COLOR UR AUTO: NORMAL
CREAT SERPL-MCNC: 0.99 MG/DL (ref 0.67–1.17)
D DIMER PPP FEU-MCNC: 0.45 UG/ML FEU (ref 0–0.5)
DEPRECATED HCO3 PLAS-SCNC: 19 MMOL/L (ref 22–29)
DIASTOLIC BLOOD PRESSURE - MUSE: NORMAL MMHG
EOSINOPHIL # BLD AUTO: 0.1 10E3/UL (ref 0–0.7)
EOSINOPHIL NFR BLD AUTO: 2 %
ERYTHROCYTE [DISTWIDTH] IN BLOOD BY AUTOMATED COUNT: 14.1 % (ref 10–15)
FLUAV RNA SPEC QL NAA+PROBE: NEGATIVE
FLUBV RNA RESP QL NAA+PROBE: NEGATIVE
GFR SERPL CREATININE-BSD FRML MDRD: >90 ML/MIN/1.73M2
GLUCOSE SERPL-MCNC: 138 MG/DL (ref 70–99)
GLUCOSE UR STRIP-MCNC: NEGATIVE MG/DL
GROUP A STREP BY PCR: NOT DETECTED
HCT VFR BLD AUTO: 36.5 % (ref 40–53)
HGB BLD-MCNC: 12.3 G/DL (ref 13.3–17.7)
HGB UR QL STRIP: NEGATIVE
HOLD SPECIMEN: NORMAL
HOLD SPECIMEN: NORMAL
IMM GRANULOCYTES # BLD: 0 10E3/UL
IMM GRANULOCYTES NFR BLD: 1 %
INTERPRETATION ECG - MUSE: NORMAL
KETONES UR STRIP-MCNC: NEGATIVE MG/DL
LEUKOCYTE ESTERASE UR QL STRIP: NEGATIVE
LIPASE SERPL-CCNC: 57 U/L (ref 13–60)
LYMPHOCYTES # BLD AUTO: 0.9 10E3/UL (ref 0.8–5.3)
LYMPHOCYTES NFR BLD AUTO: 14 %
MCH RBC QN AUTO: 30.9 PG (ref 26.5–33)
MCHC RBC AUTO-ENTMCNC: 33.7 G/DL (ref 31.5–36.5)
MCV RBC AUTO: 92 FL (ref 78–100)
MONOCYTES # BLD AUTO: 0.7 10E3/UL (ref 0–1.3)
MONOCYTES NFR BLD AUTO: 10 %
NEUTROPHILS # BLD AUTO: 4.6 10E3/UL (ref 1.6–8.3)
NEUTROPHILS NFR BLD AUTO: 72 %
NITRATE UR QL: NEGATIVE
NRBC # BLD AUTO: 0 10E3/UL
NRBC BLD AUTO-RTO: 0 /100
P AXIS - MUSE: 52 DEGREES
PH UR STRIP: 6.5 [PH] (ref 5–7)
PLATELET # BLD AUTO: 275 10E3/UL (ref 150–450)
POTASSIUM SERPL-SCNC: 3.9 MMOL/L (ref 3.4–5.3)
PR INTERVAL - MUSE: 154 MS
PROT SERPL-MCNC: 7.6 G/DL (ref 6.4–8.3)
QRS DURATION - MUSE: 78 MS
QT - MUSE: 296 MS
QTC - MUSE: 406 MS
R AXIS - MUSE: 26 DEGREES
RBC # BLD AUTO: 3.98 10E6/UL (ref 4.4–5.9)
RBC URINE: <1 /HPF
RSV RNA SPEC NAA+PROBE: NEGATIVE
SARS-COV-2 RNA RESP QL NAA+PROBE: NEGATIVE
SODIUM SERPL-SCNC: 136 MMOL/L (ref 136–145)
SP GR UR STRIP: 1.01 (ref 1–1.03)
SQUAMOUS EPITHELIAL: <1 /HPF
SYSTOLIC BLOOD PRESSURE - MUSE: NORMAL MMHG
T AXIS - MUSE: 32 DEGREES
TROPONIN T SERPL HS-MCNC: 10 NG/L
UROBILINOGEN UR STRIP-MCNC: NORMAL MG/DL
VENTRICULAR RATE- MUSE: 113 BPM
WBC # BLD AUTO: 6.3 10E3/UL (ref 4–11)
WBC URINE: 0 /HPF

## 2023-05-24 PROCEDURE — 93005 ELECTROCARDIOGRAM TRACING: CPT

## 2023-05-24 PROCEDURE — 99285 EMERGENCY DEPT VISIT HI MDM: CPT | Mod: 25

## 2023-05-24 PROCEDURE — 87637 SARSCOV2&INF A&B&RSV AMP PRB: CPT | Performed by: PHYSICIAN ASSISTANT

## 2023-05-24 PROCEDURE — 87651 STREP A DNA AMP PROBE: CPT | Performed by: EMERGENCY MEDICINE

## 2023-05-24 PROCEDURE — 36415 COLL VENOUS BLD VENIPUNCTURE: CPT | Performed by: EMERGENCY MEDICINE

## 2023-05-24 PROCEDURE — 250N000013 HC RX MED GY IP 250 OP 250 PS 637: Performed by: PHYSICIAN ASSISTANT

## 2023-05-24 PROCEDURE — 87651 STREP A DNA AMP PROBE: CPT | Performed by: PHYSICIAN ASSISTANT

## 2023-05-24 PROCEDURE — C9803 HOPD COVID-19 SPEC COLLECT: HCPCS

## 2023-05-24 PROCEDURE — 80053 COMPREHEN METABOLIC PANEL: CPT | Performed by: EMERGENCY MEDICINE

## 2023-05-24 PROCEDURE — 250N000011 HC RX IP 250 OP 636: Performed by: PHYSICIAN ASSISTANT

## 2023-05-24 PROCEDURE — 250N000009 HC RX 250: Performed by: PHYSICIAN ASSISTANT

## 2023-05-24 PROCEDURE — 85025 COMPLETE CBC W/AUTO DIFF WBC: CPT | Performed by: EMERGENCY MEDICINE

## 2023-05-24 PROCEDURE — 85379 FIBRIN DEGRADATION QUANT: CPT | Performed by: PHYSICIAN ASSISTANT

## 2023-05-24 PROCEDURE — 85025 COMPLETE CBC W/AUTO DIFF WBC: CPT | Performed by: PHYSICIAN ASSISTANT

## 2023-05-24 PROCEDURE — 81003 URINALYSIS AUTO W/O SCOPE: CPT | Performed by: PHYSICIAN ASSISTANT

## 2023-05-24 PROCEDURE — 87637 SARSCOV2&INF A&B&RSV AMP PRB: CPT | Performed by: EMERGENCY MEDICINE

## 2023-05-24 PROCEDURE — 82248 BILIRUBIN DIRECT: CPT | Performed by: PHYSICIAN ASSISTANT

## 2023-05-24 PROCEDURE — 74177 CT ABD & PELVIS W/CONTRAST: CPT | Mod: MA

## 2023-05-24 PROCEDURE — 83690 ASSAY OF LIPASE: CPT | Performed by: PHYSICIAN ASSISTANT

## 2023-05-24 PROCEDURE — 84484 ASSAY OF TROPONIN QUANT: CPT | Performed by: PHYSICIAN ASSISTANT

## 2023-05-24 PROCEDURE — 71046 X-RAY EXAM CHEST 2 VIEWS: CPT

## 2023-05-24 PROCEDURE — 80048 BASIC METABOLIC PNL TOTAL CA: CPT | Performed by: PHYSICIAN ASSISTANT

## 2023-05-24 RX ORDER — IOPAMIDOL 755 MG/ML
120 INJECTION, SOLUTION INTRAVASCULAR ONCE
Status: COMPLETED | OUTPATIENT
Start: 2023-05-24 | End: 2023-05-24

## 2023-05-24 RX ORDER — ONDANSETRON 4 MG/1
4 TABLET, ORALLY DISINTEGRATING ORAL EVERY 8 HOURS PRN
Qty: 10 TABLET | Refills: 0 | Status: SHIPPED | OUTPATIENT
Start: 2023-05-24 | End: 2023-05-26

## 2023-05-24 RX ORDER — FLUTICASONE PROPIONATE 50 MCG
1 SPRAY, SUSPENSION (ML) NASAL DAILY
Qty: 9.9 ML | Refills: 0 | Status: SHIPPED | OUTPATIENT
Start: 2023-05-24 | End: 2023-05-26

## 2023-05-24 RX ORDER — ACETAMINOPHEN 500 MG
1000 TABLET ORAL ONCE
Status: COMPLETED | OUTPATIENT
Start: 2023-05-24 | End: 2023-05-24

## 2023-05-24 RX ADMIN — IOPAMIDOL 100 ML: 755 INJECTION, SOLUTION INTRAVENOUS at 21:56

## 2023-05-24 RX ADMIN — SODIUM CHLORIDE 65 ML: 9 INJECTION, SOLUTION INTRAVENOUS at 21:56

## 2023-05-24 RX ADMIN — ACETAMINOPHEN 1000 MG: 500 TABLET, FILM COATED ORAL at 22:32

## 2023-05-24 ASSESSMENT — ACTIVITIES OF DAILY LIVING (ADL)
ADLS_ACUITY_SCORE: 37
ADLS_ACUITY_SCORE: 37

## 2023-05-24 NOTE — ED TRIAGE NOTES
"Patient reports multiple symptoms upon ED arrival.  He reports SOB, cough, sore throat, chest pain, dizziness, \"sore body\" and he reports he has a \"hard time getting up\". History of DM.  ABCs intact, A&Ox4.     Triage Assessment     Row Name 05/24/23 1829       Triage Assessment (Adult)    Airway WDL WDL       Respiratory WDL    Respiratory WDL X;rhythm/pattern;cough    Rhythm/Pattern, Respiratory shortness of breath    Cough Frequency frequent       Skin Circulation/Temperature WDL    Skin Circulation/Temperature WDL WDL       Cardiac WDL    Cardiac WDL X;chest pain  with breathing per pt       Peripheral/Neurovascular WDL    Peripheral Neurovascular WDL WDL       Cognitive/Neuro/Behavioral WDL    Cognitive/Neuro/Behavioral WDL WDL       Eleno Coma Scale    Best Eye Response 4-->(E4) spontaneous    Best Motor Response 6-->(M6) obeys commands    Best Verbal Response 5-->(V5) oriented    Eleno Coma Scale Score 15              "

## 2023-05-25 NOTE — ED PROVIDER NOTES
History     Chief Complaint:  Chest Pain     HPI   Eric Fall is a 27 year old male who has a history of DM type II and presents with multiple complaints. The patient has had a sore throat, headaches, diffuse abdominal pain, sternal chest pain, cough, and vomiting. He feels feverish and has had chills. He feels constipated as well. He has had vomiting 1-2 times per day. Patient is a group home resident.     ROS  ROS neg other than the symptoms noted above in the HPI.    Review of External Notes:  None      Medications:    albuterol   atorvastatin   atropine  benzonatate   benztropine   chlorpromazine   clonidine   desmopressin  fluticasone   gabapentin  hydroxyzine   lisinopril   lurasidone   metformin   ondansetron   pantoprazole   phenytoin   polyethylene glycol  Sumatriptan   topiramate   trazodone    Past Medical History:    ADHD  Adult antisocial behavior  Chemical abuse  Conversion disorder  Diabetes mellitus  Fetal alcohol syndrome  Intermittent explosive disorder  Involuntary commitment  Moderate mental retardation  PTSD  Schizoaffective disorder, bipolar type   Seizures  TBI    Past Surgical History:    Hip surgery  Orthopedic surgery    Physical Exam     Patient Vitals for the past 24 hrs:   BP Temp Temp src Pulse Resp SpO2   05/24/23 2339 126/70 100.1  F (37.8  C) Oral 120 22 100 %   05/24/23 2219 126/63 100.4  F (38  C) Oral 106 24 99 %   05/24/23 1828 128/88 97.1  F (36.2  C) -- 120 18 97 %        Physical Exam  Constitutional: Pleasant. Cooperative.   Eyes: Pupils equally round and reactive  HENT: Head is normal in appearance. Oropharynx is normal with moist mucus membranes. No swelling noted.  Cardiovascular: Regular rate and rhythm and without murmurs.  Respiratory: Normal respiratory effort, lungs are clear bilaterally.  Musculoskeletal: No asymmetry of the lower extremities.  Skin: Normal, without rash. No urticaria.  Neurologic: Cranial nerves grossly intact, normal cognition, no  focal deficits. Alert and oriented x 3.   Psychiatric: Normal affect.  Nursing notes and vital signs reviewed.      Emergency Department Course   ECG  ECG results from 05/24/23   EKG 12-lead, tracing only     Value    Systolic Blood Pressure     Diastolic Blood Pressure     Ventricular Rate 113    Atrial Rate 113    [place] Interval 154    QRS Duration 78        QTc 406    P Axis 52    R AXIS 26    T Axis 32    Interpretation ECG      Sinus tachycardia  Otherwise normal ECG  When compared with ECG of 04-JUN-2022 13:27,  No significant change was found  Confirmed by GENERATED REPORT, COMPUTER ([id number]),  Emperatriz Logan ([id number]) on 5/24/2023 8:11:00 PM         Imaging:  CT Abdomen Pelvis w Contrast   Final Result   IMPRESSION:       Moderate amount of stool seen throughout the colon including the rectosigmoid suggestive of constipation. No acute abnormalities in the abdomen and pelvis.      XR Chest 2 Views   Final Result   IMPRESSION: Negative chest.        Report per radiology    Laboratory:  Labs Ordered and Resulted from Time of ED Arrival to Time of ED Departure   BASIC METABOLIC PANEL - Abnormal       Result Value    Sodium 136      Potassium 3.9      Chloride 103      Carbon Dioxide (CO2) 19 (*)     Anion Gap 14      Urea Nitrogen 9.2      Creatinine 0.99      Calcium 9.4      Glucose 138 (*)     GFR Estimate >90     CBC WITH PLATELETS AND DIFFERENTIAL - Abnormal    WBC Count 6.3      RBC Count 3.98 (*)     Hemoglobin 12.3 (*)     Hematocrit 36.5 (*)     MCV 92      MCH 30.9      MCHC 33.7      RDW 14.1      Platelet Count 275      % Neutrophils 72      % Lymphocytes 14      % Monocytes 10      % Eosinophils 2      % Basophils 1      % Immature Granulocytes 1      NRBCs per 100 WBC 0      Absolute Neutrophils 4.6      Absolute Lymphocytes 0.9      Absolute Monocytes 0.7      Absolute Eosinophils 0.1      Absolute Basophils 0.1      Absolute Immature Granulocytes 0.0      Absolute NRBCs 0.0     INFLUENZA A/B,  RSV, & SARS-COV2 PCR - Normal    Influenza A PCR Negative      Influenza B PCR Negative      RSV PCR Negative      SARS CoV2 PCR Negative     LIPASE - Normal    Lipase 57     HEPATIC FUNCTION PANEL - Normal    Protein Total 7.6      Albumin 4.6      Bilirubin Total 0.3      Alkaline Phosphatase 110      AST 26      ALT 27      Bilirubin Direct <0.20     TROPONIN T, HIGH SENSITIVITY - Normal    Troponin T, High Sensitivity 10     D DIMER QUANTITATIVE - Normal    D-Dimer Quantitative 0.45     ROUTINE UA WITH MICROSCOPIC REFLEX TO CULTURE - Normal    Color Urine Light Yellow      Appearance Urine Clear      Glucose Urine Negative      Bilirubin Urine Negative      Ketones Urine Negative      Specific Gravity Urine 1.010      Blood Urine Negative      pH Urine 6.5      Protein Albumin Urine Negative      Urobilinogen Urine Normal      Nitrite Urine Negative      Leukocyte Esterase Urine Negative      RBC Urine <1      WBC Urine 0      Squamous Epithelials Urine <1     GROUP A STREPTOCOCCUS PCR THROAT SWAB - Normal    Group A strep by PCR Not Detected        Emergency Department Course & Assessments:    Interventions:  Medications   iopamidol (ISOVUE-370) solution 120 mL (100 mLs Intravenous $Given 5/24/23 2156)   Saline CT scan flush (65 mLs Intravenous $Given 5/24/23 2156)   acetaminophen (TYLENOL) tablet 1,000 mg (1,000 mg Oral $Given 5/24/23 2232)        Assessments:  2117 I obtained history and examined patient.    Independent Interpretation (X-rays, CTs, rhythm strip):  I personally evaluated the patient's CXR, no obvious PNA, PTX.    Consultations/Discussion of Management or Tests:  None     Disposition:  The patient was discharged to home.     Impression & Plan      Medical Decision Making:  Eric Fall is a 27 year old male who presents to the ED for numerous symptoms including sore throat, headache, diffuse abdominal pain, chest pain, cough, vomiting, chills.  See HPI as above for additional details.   Differential is broad and included strep, COVID, influenza, PTA, RPA, epiglottitis, meningitis, appendicitis, pancreatitis, biliary pathology, SBO, perforated viscus, ACS, dissection, PE, pneumothorax, pneumonia, pericarditis, electrolyte abnormality, viral GI illness, amongst others.  Given broad differential, broad work-up obtained as above.  Discussed with patient unclear etiology of his symptoms at this time.  Suspect viral URI and viral GI illness.  We will send patient home with Zofran and Flonase as per his request.  Advised ongoing Tylenol for symptoms, stay hydrated. Discussed reasons to return. All questions answered. Patient discharged to home in stable condition. Of note, patient tachycardic throughout his stay, this appears similar to previous on chart review, moreover patient additionally febrile.    Diagnosis:    ICD-10-CM    1. Abdominal pain, generalized  R10.84       2. Nausea and vomiting  R11.2       3. Cough  R05.9       4. Chest pain  R07.9       5. Tachycardia  R00.0       6. URI (upper respiratory infection)  J06.9            Discharge Medications:  Discharge Medication List as of 5/24/2023 11:30 PM      START taking these medications    Details   !! fluticasone (FLONASE) 50 MCG/ACT nasal spray Spray 1 spray into both nostrils daily, Disp-9.9 mL, R-0, Local Print       !! - Potential duplicate medications found. Please discuss with provider.         Scribe Disclosure:  Dana MENDOZA Hired, am serving as a scribe at 8:52 PM on 5/24/2023 to document services personally performed by Stephan Mock PA-C based on my observations and the provider's statements to me.  5/24/2023   Stephan Mock PA-C     This record was created at least in part using electronic voice recognition software, so please excuse any typographical errors.         Stephan Mock PA-C  05/25/23 0033

## 2023-05-25 NOTE — DISCHARGE INSTRUCTIONS
The cause of your symptoms is unclear at this time. There is no evidence for life-threatening cause at this time.    Use Zofran for nausea.    Use Tylenol 1000mg every 8 hours for fever.    Use Flonase to help with nasal congestion.

## 2023-05-26 RX ORDER — FLUTICASONE PROPIONATE 50 MCG
1 SPRAY, SUSPENSION (ML) NASAL DAILY
Qty: 9.9 ML | Refills: 0 | Status: ON HOLD | OUTPATIENT
Start: 2023-05-26 | End: 2024-07-04

## 2023-05-26 RX ORDER — ONDANSETRON 4 MG/1
4 TABLET, ORALLY DISINTEGRATING ORAL EVERY 8 HOURS PRN
Qty: 10 TABLET | Refills: 0 | Status: SHIPPED | OUTPATIENT
Start: 2023-05-26 | End: 2024-06-18

## 2023-06-12 NOTE — LETTER
August 25, 2022      To Whom It May Concern:      Eric Fall was seen in our Emergency Department today, 08/25/22.  I expect his condition to improve over the next days.  He may return to work/school when improved.    Sincerely,      ASHLI Bobo         Metronidazole Counseling:  I discussed with the patient the risks of metronidazole including but not limited to seizures, nausea/vomiting, a metallic taste in the mouth, nausea/vomiting and severe allergy.

## 2023-08-29 ENCOUNTER — HOSPITAL ENCOUNTER (EMERGENCY)
Facility: CLINIC | Age: 28
Discharge: LEFT BEFORE DISCHARGE | End: 2023-08-29
Payer: MEDICARE

## 2023-08-29 VITALS
TEMPERATURE: 97.7 F | RESPIRATION RATE: 18 BRPM | DIASTOLIC BLOOD PRESSURE: 85 MMHG | SYSTOLIC BLOOD PRESSURE: 127 MMHG | OXYGEN SATURATION: 98 % | HEART RATE: 111 BPM

## 2023-08-29 NOTE — ED TRIAGE NOTES
A&O x4, ABCs intact. Pt presents with caregiver from group home for constipation. Pt was seen at [facility] today, they gave directions for colon cleanse and it's not working. Pt has not had stool for 8 days.         Triage Assessment       Row Name 08/29/23 0955       Triage Assessment (Adult)    Airway WDL WDL       Respiratory WDL    Respiratory WDL WDL       Cardiac WDL    Cardiac WDL X;rhythm    Pulse Rate & Regularity tachycardic

## 2024-01-01 ENCOUNTER — APPOINTMENT (OUTPATIENT)
Dept: CT IMAGING | Facility: CLINIC | Age: 29
End: 2024-01-01
Attending: PHYSICIAN ASSISTANT
Payer: MEDICARE

## 2024-01-01 ENCOUNTER — HOSPITAL ENCOUNTER (EMERGENCY)
Facility: CLINIC | Age: 29
Discharge: HOME | End: 2024-01-01
Attending: PHYSICIAN ASSISTANT | Admitting: PHYSICIAN ASSISTANT
Payer: MEDICARE

## 2024-01-01 VITALS
SYSTOLIC BLOOD PRESSURE: 136 MMHG | OXYGEN SATURATION: 97 % | TEMPERATURE: 98.7 F | RESPIRATION RATE: 30 BRPM | HEART RATE: 104 BPM | DIASTOLIC BLOOD PRESSURE: 94 MMHG

## 2024-01-01 DIAGNOSIS — M79.10 MYALGIA: ICD-10-CM

## 2024-01-01 DIAGNOSIS — N28.1 RENAL CYST, LEFT: ICD-10-CM

## 2024-01-01 DIAGNOSIS — K59.00 CONSTIPATION: ICD-10-CM

## 2024-01-01 DIAGNOSIS — R11.10 VOMITING: ICD-10-CM

## 2024-01-01 LAB
ALBUMIN SERPL BCG-MCNC: 4.7 G/DL (ref 3.5–5.2)
ALBUMIN UR-MCNC: 30 MG/DL
ALP SERPL-CCNC: 89 U/L (ref 40–150)
ALT SERPL W P-5'-P-CCNC: 23 U/L (ref 0–70)
ANION GAP SERPL CALCULATED.3IONS-SCNC: 12 MMOL/L (ref 7–15)
APPEARANCE UR: CLEAR
AST SERPL W P-5'-P-CCNC: 27 U/L (ref 0–45)
BASOPHILS # BLD AUTO: 0 10E3/UL (ref 0–0.2)
BASOPHILS NFR BLD AUTO: 0 %
BILIRUB SERPL-MCNC: 0.5 MG/DL
BILIRUB UR QL STRIP: NEGATIVE
BUN SERPL-MCNC: 13.7 MG/DL (ref 6–20)
CALCIUM SERPL-MCNC: 9 MG/DL (ref 8.6–10)
CHLORIDE SERPL-SCNC: 102 MMOL/L (ref 98–107)
COLOR UR AUTO: YELLOW
CREAT SERPL-MCNC: 1.04 MG/DL (ref 0.67–1.17)
DEPRECATED HCO3 PLAS-SCNC: 22 MMOL/L (ref 22–29)
EGFRCR SERPLBLD CKD-EPI 2021: >90 ML/MIN/1.73M2
EOSINOPHIL # BLD AUTO: 0.1 10E3/UL (ref 0–0.7)
EOSINOPHIL NFR BLD AUTO: 3 %
ERYTHROCYTE [DISTWIDTH] IN BLOOD BY AUTOMATED COUNT: 14.9 % (ref 10–15)
FLUAV RNA SPEC QL NAA+PROBE: NEGATIVE
FLUBV RNA RESP QL NAA+PROBE: NEGATIVE
GLUCOSE BLDC GLUCOMTR-MCNC: 137 MG/DL (ref 70–99)
GLUCOSE SERPL-MCNC: 121 MG/DL (ref 70–99)
GLUCOSE UR STRIP-MCNC: NEGATIVE MG/DL
HCT VFR BLD AUTO: 39.6 % (ref 40–53)
HGB BLD-MCNC: 13.2 G/DL (ref 13.3–17.7)
HGB UR QL STRIP: NEGATIVE
IMM GRANULOCYTES # BLD: 0 10E3/UL
IMM GRANULOCYTES NFR BLD: 0 %
KETONES UR STRIP-MCNC: NEGATIVE MG/DL
LEUKOCYTE ESTERASE UR QL STRIP: NEGATIVE
LIPASE SERPL-CCNC: 33 U/L (ref 13–60)
LYMPHOCYTES # BLD AUTO: 1.2 10E3/UL (ref 0.8–5.3)
LYMPHOCYTES NFR BLD AUTO: 24 %
MCH RBC QN AUTO: 30.3 PG (ref 26.5–33)
MCHC RBC AUTO-ENTMCNC: 33.3 G/DL (ref 31.5–36.5)
MCV RBC AUTO: 91 FL (ref 78–100)
MONOCYTES # BLD AUTO: 0.4 10E3/UL (ref 0–1.3)
MONOCYTES NFR BLD AUTO: 9 %
MUCOUS THREADS #/AREA URNS LPF: PRESENT /LPF
NEUTROPHILS # BLD AUTO: 3.3 10E3/UL (ref 1.6–8.3)
NEUTROPHILS NFR BLD AUTO: 64 %
NITRATE UR QL: NEGATIVE
NRBC # BLD AUTO: 0 10E3/UL
NRBC BLD AUTO-RTO: 0 /100
PH UR STRIP: 7.5 [PH] (ref 5–7)
PLATELET # BLD AUTO: 370 10E3/UL (ref 150–450)
POTASSIUM SERPL-SCNC: 4.2 MMOL/L (ref 3.4–5.3)
PROT SERPL-MCNC: 8.5 G/DL (ref 6.4–8.3)
RBC # BLD AUTO: 4.36 10E6/UL (ref 4.4–5.9)
RBC URINE: 1 /HPF
RSV RNA SPEC NAA+PROBE: NEGATIVE
SARS-COV-2 RNA RESP QL NAA+PROBE: NEGATIVE
SODIUM SERPL-SCNC: 136 MMOL/L (ref 135–145)
SP GR UR STRIP: 1.03 (ref 1–1.03)
SQUAMOUS EPITHELIAL: <1 /HPF
TROPONIN T SERPL HS-MCNC: 11 NG/L
UROBILINOGEN UR STRIP-MCNC: >12 MG/DL
WBC # BLD AUTO: 5.1 10E3/UL (ref 4–11)
WBC URINE: <1 /HPF

## 2024-01-01 PROCEDURE — 81001 URINALYSIS AUTO W/SCOPE: CPT | Performed by: PHYSICIAN ASSISTANT

## 2024-01-01 PROCEDURE — 85041 AUTOMATED RBC COUNT: CPT | Performed by: PHYSICIAN ASSISTANT

## 2024-01-01 PROCEDURE — 82803 BLOOD GASES ANY COMBINATION: CPT

## 2024-01-01 PROCEDURE — 74176 CT ABD & PELVIS W/O CONTRAST: CPT | Mod: MA

## 2024-01-01 PROCEDURE — 87637 SARSCOV2&INF A&B&RSV AMP PRB: CPT | Performed by: PHYSICIAN ASSISTANT

## 2024-01-01 PROCEDURE — 83690 ASSAY OF LIPASE: CPT | Performed by: PHYSICIAN ASSISTANT

## 2024-01-01 PROCEDURE — 84484 ASSAY OF TROPONIN QUANT: CPT | Performed by: PHYSICIAN ASSISTANT

## 2024-01-01 PROCEDURE — 82962 GLUCOSE BLOOD TEST: CPT

## 2024-01-01 PROCEDURE — 80053 COMPREHEN METABOLIC PANEL: CPT | Performed by: PHYSICIAN ASSISTANT

## 2024-01-01 PROCEDURE — 250N000011 HC RX IP 250 OP 636: Performed by: EMERGENCY MEDICINE

## 2024-01-01 PROCEDURE — 99285 EMERGENCY DEPT VISIT HI MDM: CPT | Mod: 25

## 2024-01-01 PROCEDURE — 70450 CT HEAD/BRAIN W/O DYE: CPT | Mod: MA

## 2024-01-01 PROCEDURE — 93005 ELECTROCARDIOGRAM TRACING: CPT

## 2024-01-01 PROCEDURE — 36415 COLL VENOUS BLD VENIPUNCTURE: CPT | Performed by: PHYSICIAN ASSISTANT

## 2024-01-01 RX ORDER — ONDANSETRON 4 MG/1
4 TABLET, ORALLY DISINTEGRATING ORAL ONCE
Status: COMPLETED | OUTPATIENT
Start: 2024-01-01 | End: 2024-01-01

## 2024-01-01 RX ORDER — ONDANSETRON 4 MG/1
4 TABLET, ORALLY DISINTEGRATING ORAL EVERY 6 HOURS PRN
Qty: 10 TABLET | Refills: 0 | Status: SHIPPED | OUTPATIENT
Start: 2024-01-01 | End: 2024-01-04

## 2024-01-01 RX ORDER — ONDANSETRON 2 MG/ML
4 INJECTION INTRAMUSCULAR; INTRAVENOUS ONCE
Status: DISCONTINUED | OUTPATIENT
Start: 2024-01-01 | End: 2024-01-01

## 2024-01-01 RX ADMIN — ONDANSETRON 4 MG: 4 TABLET, ORALLY DISINTEGRATING ORAL at 18:19

## 2024-01-01 ASSESSMENT — ACTIVITIES OF DAILY LIVING (ADL)
ADLS_ACUITY_SCORE: 35
ADLS_ACUITY_SCORE: 37

## 2024-01-02 LAB
ATRIAL RATE - MUSE: 100 BPM
DIASTOLIC BLOOD PRESSURE - MUSE: NORMAL MMHG
HCO3 BLDV-SCNC: 22 MMOL/L (ref 21–28)
INTERPRETATION ECG - MUSE: NORMAL
LACTATE BLD-SCNC: 1.3 MMOL/L
P AXIS - MUSE: 57 DEGREES
PCO2 BLDV: 37 MM HG (ref 40–50)
PH BLDV: 7.38 [PH] (ref 7.32–7.43)
PO2 BLDV: 32 MM HG (ref 25–47)
PR INTERVAL - MUSE: 158 MS
QRS DURATION - MUSE: 90 MS
QT - MUSE: 328 MS
QTC - MUSE: 423 MS
R AXIS - MUSE: 29 DEGREES
SAO2 % BLDV: 62 % (ref 94–100)
SYSTOLIC BLOOD PRESSURE - MUSE: NORMAL MMHG
T AXIS - MUSE: 44 DEGREES
VENTRICULAR RATE- MUSE: 100 BPM

## 2024-01-02 NOTE — DISCHARGE INSTRUCTIONS
Discharge Instructions  Constipation  Constipation can cause severe cramping pain and your provider thinks this might be the cause of your abdominal pain (belly pain) today.  People usually recognize that they are constipated because they have difficulty having bowel movements, are not having bowel movements frequently enough, or are not having large enough bowel movements. Sometimes, especially in children or older people, you do not recognize that you are constipated until it becomes severe. The most common causes of constipation are a lack of exercise and not eating enough fruits, vegetables, and whole grains. Constipation can also be a side effect of medications, such as narcotics, or may be caused by a disease of the digestive system.    Generally, every Emergency Department visit should have a follow-up clinic visit with either a primary or a specialty clinic/provider. Please follow-up as instructed by your emergency provider today. Sometimes, chronic constipation requires further testing to determine the cause. If you are over 50 years old, you may need a colonoscopy if you have not had one before.     Return to the Emergency Department if:  Your abdominal pain worsens or does not improve after a bowel movement.  You become very weak.  You get a temperature above 102oF or as directed by your provider.  You have blood in your stools (bright red or black, tarry stools).  You keep vomiting (throwing up) or cannot drink liquids.  Your see blood when you vomit.  Your stomach gets bloated or bigger.  You have new symptoms or anything that worries you.    What can I do to help myself?  If your provider gave you a cathartic medication, like magnesium citrate or GoLytely  (polyethylene glycol), you can expect to have cramps and gas pains after taking it. You can expect to have a number of bowel movements and even diarrhea (loose or watery stools) in the course of clearing your bowels.  You will know your bowels have  been cleaned out after you pass clear liquid. The cramps and gas should let up after you have emptied your bowels. You may want to wait until morning to take this type of medication so you aren t up in the night.   Sometimes instead of cathartics, we recommend laxatives like milk of magnesia to move your bowels more slowly, or an enema to help the bowels to move. Read and follow the package directions, or follow your provider s instructions.  Once you have become very constipated, it takes time for your bowels to return to normal and you need to be very careful to prevent becoming constipated again. Take a laxative if you do not move your bowels at least every two days.     Eat foods that have a lot of fiber. Good choices are fruits, vegetables, prune juice, apple juice, and high fiber cereal. Limit dairy products such as milk and cheese, since these can make constipation worse.   Drink plenty of water.   When you feel the need to go to the bathroom, go to the bathroom. Do not hold it.  Miralax , Metamucil , Colace , Senna or fiber supplements can be used daily.  Miralax  daily is often the best choice for children.  If you were given a prescription for medicine here today, be sure to read all of the information (including the package insert) that comes with your prescription.  This will include important information about the medicine, its side effects, and any warnings that you need to know about.  The pharmacist who fills the prescription can provide more information and answer questions you may have about the medicine.  If you have questions or concerns that the pharmacist cannot address, please call or return to the Emergency Department.   Remember that you can always come back to the Emergency Department if you are not able to see your regular provider in the amount of time listed above, if you get any new symptoms, or if there is anything that worries you.  Discharge Instructions  Vomiting    You have been  seen today for vomiting (throwing up). This is usually caused by a virus, but some bacteria, parasites, medicines or other medical conditions can cause similar symptoms. At this time your provider does not find that your vomiting is a sign of anything dangerous or life-threatening. However, sometimes the signs of serious illness do not show up right away. If you have new or worse symptoms, you may need to be seen again in the Emergency Department or by your primary provider. Remember that serious problems like appendicitis can start as vomiting.    Generally, every Emergency Department visit should have a follow-up clinic visit with either a primary or a specialty clinic/provider. Please follow-up as instructed by your emergency provider today.    Return to the Emergency Department if:  You keep vomiting and you are not able to keep liquids down.   You feel you are getting dehydrated, such as being very thirsty, not urinating (peeing) at least every 8-12 hours, or feeling faint or lightheaded.   You develop a new fever, or your fever continues for more than 2 days.   You have abdominal (belly pain) that seems worse than cramps, is in one spot, or is getting worse over time. Appendicitis usually causes pain in the right lower abdomen (to the right and below your belly button) so watch for pain in this location.  You have blood in your vomit or stools.   You feel very weak.  You are not starting to improve within 24 hours of your visit here.     What can I do to help myself?  The most important thing to do is to drink clear liquids. If you have been vomiting a lot, it is best to have only small, frequent sips of liquids. Drinking too much at once may cause more vomiting. If you are vomiting often, you must replace minerals, sodium and potassium lost with your illness. Pedialyte  is the best available rehydration liquid but some find that it doesn t taste good so sports drinks are an alterative. You can also drink  clear liquids such as water, weak tea, apple juice, and 7-Up . Avoid acid liquids (orange), caffeine (coffee) or alcohol. Do not drink milk until you no longer have diarrhea (loose stools).   After liquids are staying down, you may start eating mild foods. Soda crackers, toast, plain noodles, gelatin, applesauce and bananas are good first choices. Avoid foods that have acid, are spicy, fatty or have a lot of fiber (such as meats, coarse grains, vegetables). You may start eating these foods again in about 3 days when you are better.   Sometimes treatment includes prescription medicine to prevent nausea (sick to your stomach) and vomiting. If your provider prescribes these for you, take them as directed.   Do not take ibuprofen, naproxen, or other nonsteroidal anti-inflammatory (NSAID) medicines without checking with your healthcare provider.     If you were given a prescription for medicine here today, be sure to read all of the information (including the package insert) that comes with your prescription.  This will include important information about the medicine, its side effects, and any warnings that you need to know about.  The pharmacist who fills the prescription can provide more information and answer questions you may have about the medicine.  If you have questions or concerns that the pharmacist cannot address, please call or return to the Emergency Department.     Remember that you can always come back to the Emergency Department if you are not able to see your regular provider in the amount of time listed above, if you get any new symptoms, or if there is anything that worries you.  Discharge Instructions  Incidental Findings    An incidental finding is something unexpected that was found while you were being treated and is felt to not be related to the reason that you came to the Emergency Department.  While this finding is not an emergency, you need to follow up with your primary provider (or  occasionally a specialist) to determine if anything should be done about it.    These findings can come from:  Checking your vital signs (example: high blood pressure).  Taking your history (example: unexplained weight loss).  The physical exam (example: a heart murmur).  Laboratory study (example: anemia or low blood count).  X-rays/ultrasound/CT or other imaging (example: an unexplained mass).    Generally, every Emergency Department visit should have a follow-up clinic visit with either a primary or a specialty clinic/provider. Please follow-up as instructed by your emergency provider today.    Return to the Emergency Department if:  Your condition worsens.  You develop unexpected pain.  You now develop new symptoms or have new concerns.  If you were given a prescription for medicine here today, be sure to read all of the information (including the package insert) that comes with your prescription.  This will include important information about the medicine, its side effects, and any warnings that you need to know about.  The pharmacist who fills the prescription can provide more information and answer questions you may have about the medicine.  If you have questions or concerns that the pharmacist cannot address, please call or return to the Emergency Department.   Remember that you can always come back to the Emergency Department if you are not able to see your regular provider in the amount of time listed above, if you get any new symptoms, or if there is anything that worries you.

## 2024-01-02 NOTE — ED PROVIDER NOTES
History     Chief Complaint:  Fever and Nausea & Vomiting     The history is provided by the patient.      Eric Fall is a 28 year old male with extensive past medical history including but not limited to diabetes, seizures, chemical use, PTSD, schizoaffective disorder, among others who presents for evaluation of vomiting fever abdominal pain and urinary frequency.  The patient states that he has also felt dizzy and lightheaded.  He notes he had a temp of 102 yesterday.  He reports abdominal pain mostly in the umbilical area.  Denies diarrhea or rash.  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  He has not measured a fever today.  Denies rash.  No recent travel.  No neck stiffness.  Has had a slight headache.  No sore throat cough or rhinorrhea.  He notes increased urinary frequency.  He notes he is already taking multiple medications for constipation at baseline.  No vision changes, or 1 sided numbness/weakness.   No sore throat.  Independent Historian:   None - Patient Only    Review of External Notes:   None     Medications:    Albuterol   Aspirin 81 mg   Atorvastatin   Benzonatate   Benztropine   Chlorpromazine   Clonidine   Clozapine   Desmopressin   Fluticasone   Gabapentin  Hydroxyzine  Lisinopril   Lurasidone   Lurasidone   Metformin   Ondansetron   Oxycodone   Pantoprazole   Sumatriptan   Topiramate  Trazodone     Past Medical History:    ADHD   Adult antisocial behavior  Chemical abuse   Conversion disorder  Diabetes mellitus   Fetal alcohol syndrome  Hip dysplasia  Intermittent explosive disorder  Involuntary commitment  MR   Obesity  PTSD   Schizoaffective disorder  Seizures   TBI  Schizophrenia   Onychomycosis   Sleep enuresis   Psychosis   GERD  Diabetes mellitus   Seizure   Epilepsy   Dysphagia   Emmetropia   Intellectual disability   VUR  Tachycardia   Somatoform disorder   Hyperglycemia     Past Surgical History:    SCFE  Bilateral hip surgery with pin placement    L hip surgery   Joint replacement   Hip arthroscopy   Femur osteoplasty     Physical Exam   Patient Vitals for the past 24 hrs:   BP Temp Temp src Pulse Resp SpO2   01/01/24 2141 (!) 136/94 -- -- 104 30 97 %   01/01/24 1916 (!) 116/97 -- -- 110 -- --   01/01/24 1912 -- 98.7  F (37.1  C) Oral 108 18 100 %      Physical Exam  General: Awake, alert, non-toxic.  Head:  Scalp is NC/AT  Eyes:  Conjunctiva normal, PERRL  ENT:  The external nose and ears are normal.     Oropharynx clear, uvula midline. No tonsillar swelling or lesions.  TM's normal BL.  Neck:  Normal range of motion without rigidity.  CV:  Mildly tachycardic but regular.    No pathologic murmur, rubs, or gallops.  Resp:  Breath sounds are clear bilaterally    Non-labored, no retractions or accessory muscle use  Abdomen: Abdomen is soft, no distension, diffuse Mild tenderness, no rebound or guarding.  no masses. No CVA tenderness.  MS:  No lower extremity edema/swelling. No midline cervical, thoracic, or lumbar tenderness.  Extremities without joint swelling or redness.  Skin:  Warm and dry, No rash or lesions noted.  Neuro:  Alert and oriented.  GCS 15 Moves all extremities normal.  Normal sensation to touch BL in UE, LE, face. CNII-XII intact.  Normal finger to nose and heel to shin testing BL. Gait normal.  Psych:  Awake. Alert. Normal affect. Appropriate interactions.      Emergency Department Course   ECG  ECG results from 01/01/24   EKG 12 lead     Value    Systolic Blood Pressure     Diastolic Blood Pressure     Ventricular Rate 100    Atrial Rate 100    WA Interval 158    QRS Duration 90        QTc 423    P Axis 57    R AXIS 29    T Axis 44    Interpretation ECG      Sinus rhythm  Normal ECG  When compared with ECG of 24-MAY-2023 19:30,  No significant change was found  Confirmed by - EMERGENCY ROOM, PHYSICIAN ([id number]),  NAVEED MCCARTNEY ([id number]) on 1/2/2024 7:29:17 AM       Imaging:  Head CT w/o contrast   Final Result   IMPRESSION:   1.   Normal head CT.      Abd/pelvis CT no contrast - [facility] Protocol   Final Result   IMPRESSION:    1.  Constipation. No evidence for bowel obstruction.   2.  Left renal cyst.            Laboratory:  Labs Ordered and Resulted from Time of ED Arrival to Time of ED Departure   GLUCOSE BY METER - Abnormal       Result Value    GLUCOSE BY METER POCT 137 (*)    COMPREHENSIVE METABOLIC PANEL - Abnormal    Sodium 136      Potassium 4.2      Carbon Dioxide (CO2) 22      Anion Gap 12      Urea Nitrogen 13.7      Creatinine 1.04      GFR Estimate >90      Calcium 9.0      Chloride 102      Glucose 121 (*)     Alkaline Phosphatase 89      AST 27      ALT 23      Protein Total 8.5 (*)     Albumin 4.7      Bilirubin Total 0.5     ROUTINE UA WITH MICROSCOPIC REFLEX TO CULTURE - Abnormal    Color Urine Yellow      Appearance Urine Clear      Glucose Urine Negative      Bilirubin Urine Negative      Ketones Urine Negative      Specific Gravity Urine 1.030      Blood Urine Negative      pH Urine 7.5 (*)     Protein Albumin Urine 30 (*)     Urobilinogen Urine >12.0 (*)     Nitrite Urine Negative      Leukocyte Esterase Urine Negative      Mucus Urine Present (*)     RBC Urine 1      WBC Urine <1      Squamous Epithelials Urine <1     CBC WITH PLATELETS AND DIFFERENTIAL - Abnormal    WBC Count 5.1      RBC Count 4.36 (*)     Hemoglobin 13.2 (*)     Hematocrit 39.6 (*)     MCV 91      MCH 30.3      MCHC 33.3      RDW 14.9      Platelet Count 370      % Neutrophils 64      % Lymphocytes 24      % Monocytes 9      % Eosinophils 3      % Basophils 0      % Immature Granulocytes 0      NRBCs per 100 WBC 0      Absolute Neutrophils 3.3      Absolute Lymphocytes 1.2      Absolute Monocytes 0.4      Absolute Eosinophils 0.1      Absolute Basophils 0.0      Absolute Immature Granulocytes 0.0      Absolute NRBCs 0.0     INFLUENZA A/B, RSV, & SARS-COV2 PCR - Normal    Influenza A PCR Negative      Influenza B PCR Negative      RSV PCR Negative       SARS CoV2 PCR Negative     LIPASE - Normal    Lipase 33     TROPONIN T, HIGH SENSITIVITY - Normal    Troponin T, High Sensitivity 11       Emergency Department Course & Assessments:    Interventions:  Medications   ondansetron (ZOFRAN ODT) ODT tab 4 mg (4 mg Oral $Given 24)      Independent Interpretation (X-rays, CTs, rhythm strip):  I independently interpreted the head CT, no mass or bleed.    Assessments/Consultations/Discussion of Management or Tests:  ED Course as of 24 1255   Mon  I obtained the history and examined the patient as noted above.       I obtained the history and examined the patient as noted above.       I rechecked and updated the patient.        Social Determinants of Health affecting care:   Transportation.  Pt does not have car or transport to pharmacy to  meds this PM    Disposition:  The patient was discharged to home.     Impression & Plan      Medical Decision Makin yo male presents w/vomiting, subjective fever, abdominal pain, aches, headache.  Broad ddx considered.  Workup here re-assuring.  Afebrile and well appearing.  Lactate and WBC count normal, urine clear, nothing to suggest serious bacterial infection.  CT abdomen pelvis w/constipation but no bowel obstruction or other acute abnormality.  LFTS and lipase good.  EKG normal trop w/in normal limits w/ ongoing symptoms, reproduible w/palpation doubt ACS.  No DKA.  Head ct normal neuro exam reassuring do not suspect posterior stroke.    Likely viral syndrome.  Felt better after symptomatic meds here tolerating PO wants to go home.  Likely also component of constipation contributing to ab pain.  Already on bowel regimen will have pt up Miralax to BID/TID dosing for next few days and use PRN meds.  Close follow-up w/pcp and return in new worsening or changing sx.  Follow-up w/pcp on incidental renal cyst finding discussed need for possible additional outpatient imaging to further  identify.    Diagnosis:    ICD-10-CM    1. Vomiting  R11.10       2. Constipation  K59.00       3. Myalgia  M79.10       4. Renal cyst, left  N28.1          Discharge Medications:  Discharge Medication List as of 1/1/2024  9:41 PM        START taking these medications    Details   !! ondansetron (ZOFRAN ODT) 4 MG ODT tab Take 1 tablet (4 mg) by mouth every 6 hours as needed for nausea or vomiting, Disp-10 tablet, R-0, E-Prescribe       !! - Potential duplicate medications found. Please discuss with provider.         Scribe Disclosure:  I, Jessica Castro, am serving as a scribe at 7:22 PM on 1/1/2024 to document services personally performed by Anjum Covington PA-C based on my observations and the provider's statements to me.   1/1/2024   Anjum Covington PA-C Etten, Clark Ellsworth, PA-C  01/02/24 6521

## 2024-01-02 NOTE — ED TRIAGE NOTES
Presents to triage with c/o fever and n/v that have been ongoing for the past 3 days. Hx DMII,  in triage.

## 2024-06-18 ENCOUNTER — HOSPITAL ENCOUNTER (EMERGENCY)
Facility: CLINIC | Age: 29
Discharge: HOME | End: 2024-06-19
Attending: EMERGENCY MEDICINE | Admitting: EMERGENCY MEDICINE
Payer: MEDICARE

## 2024-06-18 DIAGNOSIS — T67.9XXA HEAT EFFECTS, INITIAL ENCOUNTER: ICD-10-CM

## 2024-06-18 DIAGNOSIS — E86.0 DEHYDRATION: ICD-10-CM

## 2024-06-18 DIAGNOSIS — G43.809 OTHER MIGRAINE WITHOUT STATUS MIGRAINOSUS, NOT INTRACTABLE: ICD-10-CM

## 2024-06-18 PROCEDURE — 96374 THER/PROPH/DIAG INJ IV PUSH: CPT

## 2024-06-18 PROCEDURE — 250N000011 HC RX IP 250 OP 636: Mod: JZ | Performed by: EMERGENCY MEDICINE

## 2024-06-18 PROCEDURE — 258N000003 HC RX IP 258 OP 636: Mod: JZ | Performed by: EMERGENCY MEDICINE

## 2024-06-18 PROCEDURE — 99284 EMERGENCY DEPT VISIT MOD MDM: CPT | Mod: 25

## 2024-06-18 PROCEDURE — 96375 TX/PRO/DX INJ NEW DRUG ADDON: CPT

## 2024-06-18 PROCEDURE — 96361 HYDRATE IV INFUSION ADD-ON: CPT

## 2024-06-18 PROCEDURE — 87637 SARSCOV2&INF A&B&RSV AMP PRB: CPT | Performed by: EMERGENCY MEDICINE

## 2024-06-18 RX ORDER — METOCLOPRAMIDE HYDROCHLORIDE 5 MG/ML
10 INJECTION INTRAMUSCULAR; INTRAVENOUS ONCE
Status: COMPLETED | OUTPATIENT
Start: 2024-06-18 | End: 2024-06-18

## 2024-06-18 RX ORDER — DIPHENHYDRAMINE HYDROCHLORIDE 50 MG/ML
25 INJECTION INTRAMUSCULAR; INTRAVENOUS ONCE
Status: COMPLETED | OUTPATIENT
Start: 2024-06-18 | End: 2024-06-18

## 2024-06-18 RX ORDER — DEXAMETHASONE SODIUM PHOSPHATE 10 MG/ML
10 INJECTION, SOLUTION INTRAMUSCULAR; INTRAVENOUS ONCE
Status: COMPLETED | OUTPATIENT
Start: 2024-06-18 | End: 2024-06-18

## 2024-06-18 RX ORDER — KETOROLAC TROMETHAMINE 15 MG/ML
10 INJECTION, SOLUTION INTRAMUSCULAR; INTRAVENOUS ONCE
Status: COMPLETED | OUTPATIENT
Start: 2024-06-18 | End: 2024-06-18

## 2024-06-18 RX ADMIN — SODIUM CHLORIDE 1000 ML: 9 INJECTION, SOLUTION INTRAVENOUS at 23:24

## 2024-06-18 RX ADMIN — DIPHENHYDRAMINE HYDROCHLORIDE 25 MG: 50 INJECTION INTRAMUSCULAR; INTRAVENOUS at 23:26

## 2024-06-18 RX ADMIN — DEXAMETHASONE SODIUM PHOSPHATE 10 MG: 10 INJECTION, SOLUTION INTRAMUSCULAR; INTRAVENOUS at 23:28

## 2024-06-18 RX ADMIN — METOCLOPRAMIDE 10 MG: 5 INJECTION, SOLUTION INTRAMUSCULAR; INTRAVENOUS at 23:24

## 2024-06-18 RX ADMIN — KETOROLAC TROMETHAMINE 10 MG: 15 INJECTION, SOLUTION INTRAMUSCULAR; INTRAVENOUS at 23:25

## 2024-06-18 ASSESSMENT — COLUMBIA-SUICIDE SEVERITY RATING SCALE - C-SSRS
2. HAVE YOU ACTUALLY HAD ANY THOUGHTS OF KILLING YOURSELF IN THE PAST MONTH?: NO
6. HAVE YOU EVER DONE ANYTHING, STARTED TO DO ANYTHING, OR PREPARED TO DO ANYTHING TO END YOUR LIFE?: NO
1. IN THE PAST MONTH, HAVE YOU WISHED YOU WERE DEAD OR WISHED YOU COULD GO TO SLEEP AND NOT WAKE UP?: NO

## 2024-06-18 ASSESSMENT — ACTIVITIES OF DAILY LIVING (ADL): ADLS_ACUITY_SCORE: 37

## 2024-06-19 VITALS
RESPIRATION RATE: 18 BRPM | OXYGEN SATURATION: 99 % | TEMPERATURE: 98.1 F | DIASTOLIC BLOOD PRESSURE: 80 MMHG | HEART RATE: 85 BPM | SYSTOLIC BLOOD PRESSURE: 138 MMHG

## 2024-06-19 LAB
FLUAV RNA SPEC QL NAA+PROBE: NEGATIVE
FLUBV RNA RESP QL NAA+PROBE: NEGATIVE
RSV RNA SPEC NAA+PROBE: NEGATIVE
SARS-COV-2 RNA RESP QL NAA+PROBE: NEGATIVE

## 2024-06-19 ASSESSMENT — ACTIVITIES OF DAILY LIVING (ADL)
ADLS_ACUITY_SCORE: 37
ADLS_ACUITY_SCORE: 37

## 2024-06-19 NOTE — ED PROVIDER NOTES
Emergency Department Note      History of Present Illness     Chief Complaint  Headache    HPI  Eric Fall is a 28 year old male with a history of migraines, ADHD, and diabetes mellitus presenting with a very severe headache. Patient feels feverish, weak, diaphoretic, and warm. Upon examination, the patient's temperature is 98.6 F. He is light sensitive. He typically treats his symptoms with sumatriptan, but he is currently out of this medication. Eric endorses decrease appetite, nausea, and vomiting. He was out in the sun most of the day. No recent fall or trauma.     Independent Historian  None    Review of External Notes  I reviewed Care Everywhere and updated Epic.     Past Medical History   Medical History and Problem List  Past Medical History:   Diagnosis Date    ADHD (attention deficit hyperactivity disorder)     Adult antisocial behavior     Chemical abuse     CKD (chronic kidney disease)     Conversion disorder 2011    Diabetes mellitus     Fetal alcohol syndrome     Hip dysplasia, congenital     Hypertension     Intermittent explosive disorder     Migraine     MR (mental retardation), moderate     Obesity     PTSD (post-traumatic stress disorder)     Schizoaffective disorder, bipolar type     Seizures 11/2021    TBI (traumatic brain injury) 2009    VUR (vesicoureteric reflux)        Medications  albuterol (PROVENTIL HFA;VENTOLIN HFA) 90 mcg/actuation inhaler  atorvastatin (LIPITOR) 20 MG tablet  atropine 1 % ophthalmic solution  benzonatate (TESSALON) 200 MG capsule  benztropine (COGENTIN) 0.5 MG tablet  chlorproMAZINE (THORAZINE) 100 MG tablet  chlorproMAZINE 200 MG TABS  cloNIDine (CATAPRES) 0.1 MG tablet  desmopressin (DDAVP) 0.2 MG tablet  fluticasone (FLONASE) 50 MCG/ACT nasal spray  fluticasone (FLONASE) 50 MCG/ACT nasal spray  gabapentin (NEURONTIN) 300 MG capsule  hydrOXYzine (ATARAX) 50 MG tablet  lisinopril (ZESTRIL) 5 MG tablet  lurasidone (LATUDA) 120 MG TABS tablet  lurasidone  (LATUDA) 20 MG TABS tablet  melatonin 5 MG tablet  metFORMIN (GLUCOPHAGE) 500 MG tablet  pantoprazole (PROTONIX) 40 MG EC tablet  phenytoin (DILANTIN) 100 MG ER capsule  SUMAtriptan (IMITREX) 5 MG/ACT nasal spray  SUMAtriptan (IMITREX) 50 MG tablet  topiramate (TOPAMAX) 100 MG tablet  traZODone (DESYREL) 100 MG tablet        Surgical History   Past Surgical History:   Procedure Laterality Date    HIP SURGERY  03/2015    Hip surgery with pin placement for SCFE  2010     Physical Exam   Patient Vitals for the past 24 hrs:   BP Temp Temp src Pulse Resp SpO2   06/19/24 0007 134/81 -- -- -- -- 99 %   06/18/24 2341 92/67 -- -- 98 -- 98 %   06/18/24 2239 140/81 98.1  F (36.7  C) Temporal 97 18 98 %     Physical Exam  Nursing note and vitals reviewed.  Constitutional: Cooperative.  Resting comfortably in a dark room  HENT:   Mouth/Throat: Dry mucous membranes.   No neck rigidity.   Eyes: Pupils are equal, round, and reactive to light.  Extraocular movements intact  Cardiovascular: Normal rate, regular rhythm and normal heart sounds.  No murmur.  Pulmonary/Chest: Effort normal and breath sounds normal. No respiratory distress. No wheezes. No rales.   Abdominal: Soft. Normal appearance. There is no tenderness. There is no rigidity and no guarding.   Neurological: Alert.  Oriented x 3.  Strength normal  Skin: Skin is warm and dry.   Psychiatric: Normal mood and affect.     Diagnostics   Lab Results   Labs Ordered and Resulted from Time of ED Arrival to Time of ED Departure   INFLUENZA A/B, RSV, & SARS-COV2 PCR - Normal       Result Value    Influenza A PCR Negative      Influenza B PCR Negative      RSV PCR Negative      SARS CoV2 PCR Negative       Imaging  No orders to display     Independent Interpretation  None    ED Course    Medications Administered  Medications   sodium chloride 0.9% BOLUS 1,000 mL (0 mLs Intravenous Stopped 6/19/24 0052)   metoclopramide (REGLAN) injection 10 mg (10 mg Intravenous $Given 6/18/24 8944)    diphenhydrAMINE (BENADRYL) injection 25 mg (25 mg Intravenous $Given 6/18/24 2326)   ketorolac (TORADOL) injection 10 mg (10 mg Intravenous $Given 6/18/24 2325)   dexAMETHasone PF (DECADRON) injection 10 mg (10 mg Intravenous $Given 6/18/24 2328)     Discussion of Management  None    Social Determinants of Health adding to complexity of care  None    ED Course  ED Course as of 06/19/24 0114   Tue Jun 18, 2024   2310 I obtained the history and examined the patient as noted above.      Wed Jun 19, 2024 0111 I rechecked and updated the patient. His headache has resolved.       Medical Decision Making / Diagnosis     MDM  Eric Fall is a 28 year old male who presented with a headache consistent with his normal migraine.  He is out of his home medication.  Contributing to his presentation is the fact he was out in the sun all day and I feel he is mildly dehydrated based on exam.  Evaluation in the emergency department has been negative. The patient has not had any fever, weakness, numbness, paresthesia, neck stiffness or confusion. Meningitis, subarachnoid hemorrhage, CNS tumor, and stroke are considered as part of the differential, and considered unlikely. The pain has improved with medication interventions.  The patient should follow-up with his primary physician within 3 days. If the headache continues or the frequency increases, consultation with neurology will be indicated.  Return if increasing pain, fever, vomiting or weakness.  Take prescribed medications as directed.    Disposition  The patient was discharged.     ICD-10 Codes:    ICD-10-CM    1. Other migraine without status migrainosus, not intractable  G43.809       2. Dehydration  E86.0       3. Heat effects, initial encounter  T67.9XXA          Scribe Disclosure:  Jessica MENDOZA, am serving as a scribe at 11:14 PM on 6/18/2024 to document services personally performed by Dinesh Londono MD based on my observations and the provider's  statements to me.        Dinesh Londono MD  06/19/24 0112

## 2024-06-19 NOTE — ED NOTES
Pt. Had an episode of emesis. Writer helped clean Pt.,  repositioned in bed and provided warm blanket for comfort.

## 2024-06-19 NOTE — ED TRIAGE NOTES
BIBA with concerns for migraine. Has a hx of migraine. Reports light sensitivity, nausea and vomiting. Is out of Imitrex. Reports it is similar to previous migraines.      Triage Assessment (Adult)       Row Name 06/18/24 8211          Triage Assessment    Airway WDL WDL        Respiratory WDL    Respiratory WDL WDL        Skin Circulation/Temperature WDL    Skin Circulation/Temperature WDL WDL        Cardiac WDL    Cardiac WDL WDL        Peripheral/Neurovascular WDL    Peripheral Neurovascular WDL WDL        Cognitive/Neuro/Behavioral WDL    Cognitive/Neuro/Behavioral WDL WDL        Wayne Coma Scale    Best Eye Response 4-->(E4) spontaneous     Best Motor Response 6-->(M6) obeys commands     Best Verbal Response 5-->(V5) oriented     Wayne Coma Scale Score 15

## 2024-07-04 ENCOUNTER — APPOINTMENT (OUTPATIENT)
Dept: GENERAL RADIOLOGY | Facility: CLINIC | Age: 29
End: 2024-07-04
Attending: EMERGENCY MEDICINE
Payer: MEDICARE

## 2024-07-04 ENCOUNTER — APPOINTMENT (OUTPATIENT)
Dept: CT IMAGING | Facility: CLINIC | Age: 29
End: 2024-07-04
Attending: EMERGENCY MEDICINE
Payer: MEDICARE

## 2024-07-04 ENCOUNTER — HOSPITAL ENCOUNTER (OUTPATIENT)
Facility: CLINIC | Age: 29
Setting detail: OBSERVATION
Discharge: HOME | End: 2024-07-05
Attending: EMERGENCY MEDICINE | Admitting: STUDENT IN AN ORGANIZED HEALTH CARE EDUCATION/TRAINING PROGRAM
Payer: MEDICARE

## 2024-07-04 ENCOUNTER — MEDICAL CORRESPONDENCE (OUTPATIENT)
Dept: HEALTH INFORMATION MANAGEMENT | Facility: CLINIC | Age: 29
End: 2024-07-04

## 2024-07-04 DIAGNOSIS — R11.2 NAUSEA AND VOMITING, UNSPECIFIED VOMITING TYPE: ICD-10-CM

## 2024-07-04 DIAGNOSIS — E87.20 ACIDOSIS: ICD-10-CM

## 2024-07-04 LAB
ALBUMIN SERPL BCG-MCNC: 4.6 G/DL (ref 3.5–5.2)
ALBUMIN UR-MCNC: NEGATIVE MG/DL
ALP SERPL-CCNC: 138 U/L (ref 40–150)
ALT SERPL W P-5'-P-CCNC: 30 U/L (ref 0–70)
ANION GAP SERPL CALCULATED.3IONS-SCNC: 13 MMOL/L (ref 7–15)
APPEARANCE UR: CLEAR
AST SERPL W P-5'-P-CCNC: 25 U/L (ref 0–45)
B-OH-BUTYR SERPL-SCNC: <0.18 MMOL/L
BASE EXCESS BLDV CALC-SCNC: -2.6 MMOL/L (ref -3–3)
BASOPHILS # BLD AUTO: 0.1 10E3/UL (ref 0–0.2)
BASOPHILS NFR BLD AUTO: 1 %
BILIRUB SERPL-MCNC: 0.3 MG/DL
BILIRUB UR QL STRIP: NEGATIVE
BUN SERPL-MCNC: 10.4 MG/DL (ref 6–20)
CALCIUM SERPL-MCNC: 9.1 MG/DL (ref 8.6–10)
CHLORIDE SERPL-SCNC: 104 MMOL/L (ref 98–107)
CK SERPL-CCNC: 297 U/L (ref 39–308)
COLOR UR AUTO: ABNORMAL
CREAT SERPL-MCNC: 0.91 MG/DL (ref 0.67–1.17)
D DIMER PPP FEU-MCNC: 0.49 UG/ML FEU (ref 0–0.5)
DEPRECATED HCO3 PLAS-SCNC: 19 MMOL/L (ref 22–29)
EGFRCR SERPLBLD CKD-EPI 2021: >90 ML/MIN/1.73M2
EOSINOPHIL # BLD AUTO: 0.1 10E3/UL (ref 0–0.7)
EOSINOPHIL NFR BLD AUTO: 1 %
ERYTHROCYTE [DISTWIDTH] IN BLOOD BY AUTOMATED COUNT: 14.8 % (ref 10–15)
FLUAV RNA SPEC QL NAA+PROBE: NEGATIVE
FLUBV RNA RESP QL NAA+PROBE: NEGATIVE
GLUCOSE BLDC GLUCOMTR-MCNC: 146 MG/DL (ref 70–99)
GLUCOSE BLDC GLUCOMTR-MCNC: 169 MG/DL (ref 70–99)
GLUCOSE BLDC GLUCOMTR-MCNC: 199 MG/DL (ref 70–99)
GLUCOSE BLDC GLUCOMTR-MCNC: 267 MG/DL (ref 70–99)
GLUCOSE SERPL-MCNC: 286 MG/DL (ref 70–99)
GLUCOSE UR STRIP-MCNC: >=1000 MG/DL
HBA1C MFR BLD: 6.7 %
HCO3 BLDV-SCNC: 24 MMOL/L (ref 21–28)
HCT VFR BLD AUTO: 37.1 % (ref 40–53)
HGB BLD-MCNC: 11.7 G/DL (ref 13.3–17.7)
HGB UR QL STRIP: NEGATIVE
IMM GRANULOCYTES # BLD: 0 10E3/UL
IMM GRANULOCYTES NFR BLD: 0 %
KETONES UR STRIP-MCNC: NEGATIVE MG/DL
LACTATE SERPL-SCNC: 1.5 MMOL/L (ref 0.7–2)
LEUKOCYTE ESTERASE UR QL STRIP: NEGATIVE
LYMPHOCYTES # BLD AUTO: 1.4 10E3/UL (ref 0.8–5.3)
LYMPHOCYTES NFR BLD AUTO: 15 %
MAGNESIUM SERPL-MCNC: 1.9 MG/DL (ref 1.7–2.3)
MCH RBC QN AUTO: 28.7 PG (ref 26.5–33)
MCHC RBC AUTO-ENTMCNC: 31.5 G/DL (ref 31.5–36.5)
MCV RBC AUTO: 91 FL (ref 78–100)
MONOCYTES # BLD AUTO: 0.3 10E3/UL (ref 0–1.3)
MONOCYTES NFR BLD AUTO: 4 %
NEUTROPHILS # BLD AUTO: 7.3 10E3/UL (ref 1.6–8.3)
NEUTROPHILS NFR BLD AUTO: 80 %
NITRATE UR QL: NEGATIVE
NRBC # BLD AUTO: 0 10E3/UL
NRBC BLD AUTO-RTO: 0 /100
O2/TOTAL GAS SETTING VFR VENT: 98 %
OSMOLALITY SERPL: 298 MMOL/KG (ref 275–295)
OXYHGB MFR BLDV: 57 % (ref 70–75)
PCO2 BLDV: 50 MM HG (ref 40–50)
PH BLDV: 7.29 [PH] (ref 7.32–7.43)
PH UR STRIP: 7.5 [PH] (ref 5–7)
PLATELET # BLD AUTO: 290 10E3/UL (ref 150–450)
PO2 BLDV: 34 MM HG (ref 25–47)
POTASSIUM SERPL-SCNC: 4.2 MMOL/L (ref 3.4–5.3)
PROT SERPL-MCNC: 7.7 G/DL (ref 6.4–8.3)
RBC # BLD AUTO: 4.08 10E6/UL (ref 4.4–5.9)
RBC URINE: <1 /HPF
RSV RNA SPEC NAA+PROBE: NEGATIVE
SAO2 % BLDV: 59.5 % (ref 70–75)
SARS-COV-2 RNA RESP QL NAA+PROBE: NEGATIVE
SODIUM SERPL-SCNC: 136 MMOL/L (ref 135–145)
SP GR UR STRIP: 1.03 (ref 1–1.03)
TROPONIN T SERPL HS-MCNC: 8 NG/L
UROBILINOGEN UR STRIP-MCNC: NORMAL MG/DL
WBC # BLD AUTO: 9.1 10E3/UL (ref 4–11)
WBC URINE: 1 /HPF

## 2024-07-04 PROCEDURE — 82550 ASSAY OF CK (CPK): CPT | Performed by: STUDENT IN AN ORGANIZED HEALTH CARE EDUCATION/TRAINING PROGRAM

## 2024-07-04 PROCEDURE — 81001 URINALYSIS AUTO W/SCOPE: CPT | Performed by: EMERGENCY MEDICINE

## 2024-07-04 PROCEDURE — 96365 THER/PROPH/DIAG IV INF INIT: CPT

## 2024-07-04 PROCEDURE — 83605 ASSAY OF LACTIC ACID: CPT | Performed by: EMERGENCY MEDICINE

## 2024-07-04 PROCEDURE — 99222 1ST HOSP IP/OBS MODERATE 55: CPT | Mod: AI | Performed by: STUDENT IN AN ORGANIZED HEALTH CARE EDUCATION/TRAINING PROGRAM

## 2024-07-04 PROCEDURE — 80053 COMPREHEN METABOLIC PANEL: CPT | Performed by: EMERGENCY MEDICINE

## 2024-07-04 PROCEDURE — 85025 COMPLETE CBC W/AUTO DIFF WBC: CPT | Performed by: EMERGENCY MEDICINE

## 2024-07-04 PROCEDURE — 83735 ASSAY OF MAGNESIUM: CPT | Performed by: EMERGENCY MEDICINE

## 2024-07-04 PROCEDURE — 250N000011 HC RX IP 250 OP 636: Performed by: EMERGENCY MEDICINE

## 2024-07-04 PROCEDURE — 258N000003 HC RX IP 258 OP 636: Performed by: EMERGENCY MEDICINE

## 2024-07-04 PROCEDURE — 82805 BLOOD GASES W/O2 SATURATION: CPT | Performed by: EMERGENCY MEDICINE

## 2024-07-04 PROCEDURE — 70450 CT HEAD/BRAIN W/O DYE: CPT | Mod: MA

## 2024-07-04 PROCEDURE — S5010 5% DEXTROSE AND 0.45% SALINE: HCPCS | Performed by: EMERGENCY MEDICINE

## 2024-07-04 PROCEDURE — 96375 TX/PRO/DX INJ NEW DRUG ADDON: CPT

## 2024-07-04 PROCEDURE — 83036 HEMOGLOBIN GLYCOSYLATED A1C: CPT | Performed by: EMERGENCY MEDICINE

## 2024-07-04 PROCEDURE — 84484 ASSAY OF TROPONIN QUANT: CPT | Performed by: EMERGENCY MEDICINE

## 2024-07-04 PROCEDURE — 82962 GLUCOSE BLOOD TEST: CPT

## 2024-07-04 PROCEDURE — 99285 EMERGENCY DEPT VISIT HI MDM: CPT | Mod: 25

## 2024-07-04 PROCEDURE — 71046 X-RAY EXAM CHEST 2 VIEWS: CPT

## 2024-07-04 PROCEDURE — 36415 COLL VENOUS BLD VENIPUNCTURE: CPT | Performed by: EMERGENCY MEDICINE

## 2024-07-04 PROCEDURE — 250N000013 HC RX MED GY IP 250 OP 250 PS 637: Performed by: STUDENT IN AN ORGANIZED HEALTH CARE EDUCATION/TRAINING PROGRAM

## 2024-07-04 PROCEDURE — 87637 SARSCOV2&INF A&B&RSV AMP PRB: CPT | Performed by: EMERGENCY MEDICINE

## 2024-07-04 PROCEDURE — 258N000003 HC RX IP 258 OP 636: Performed by: STUDENT IN AN ORGANIZED HEALTH CARE EDUCATION/TRAINING PROGRAM

## 2024-07-04 PROCEDURE — 250N000009 HC RX 250: Performed by: EMERGENCY MEDICINE

## 2024-07-04 PROCEDURE — G0378 HOSPITAL OBSERVATION PER HR: HCPCS

## 2024-07-04 PROCEDURE — 82010 KETONE BODYS QUAN: CPT | Performed by: EMERGENCY MEDICINE

## 2024-07-04 PROCEDURE — 93005 ELECTROCARDIOGRAM TRACING: CPT

## 2024-07-04 PROCEDURE — 85379 FIBRIN DEGRADATION QUANT: CPT | Performed by: EMERGENCY MEDICINE

## 2024-07-04 PROCEDURE — 74176 CT ABD & PELVIS W/O CONTRAST: CPT | Mod: MA

## 2024-07-04 PROCEDURE — 83930 ASSAY OF BLOOD OSMOLALITY: CPT | Performed by: STUDENT IN AN ORGANIZED HEALTH CARE EDUCATION/TRAINING PROGRAM

## 2024-07-04 RX ORDER — SUMATRIPTAN 50 MG/1
100 TABLET, FILM COATED ORAL
Status: DISCONTINUED | OUTPATIENT
Start: 2024-07-04 | End: 2024-07-05 | Stop reason: HOSPADM

## 2024-07-04 RX ORDER — DESMOPRESSIN ACETATE 0.1 MG/1
0.2 TABLET ORAL AT BEDTIME
Status: DISCONTINUED | OUTPATIENT
Start: 2024-07-04 | End: 2024-07-05 | Stop reason: HOSPADM

## 2024-07-04 RX ORDER — TOPIRAMATE 25 MG/1
100 TABLET, FILM COATED ORAL 2 TIMES DAILY
Status: DISCONTINUED | OUTPATIENT
Start: 2024-07-04 | End: 2024-07-05 | Stop reason: HOSPADM

## 2024-07-04 RX ORDER — GABAPENTIN 300 MG/1
300 CAPSULE ORAL 3 TIMES DAILY
Status: DISCONTINUED | OUTPATIENT
Start: 2024-07-04 | End: 2024-07-04

## 2024-07-04 RX ORDER — SODIUM CHLORIDE 9 MG/ML
INJECTION, SOLUTION INTRAVENOUS CONTINUOUS
Status: DISCONTINUED | OUTPATIENT
Start: 2024-07-04 | End: 2024-07-05 | Stop reason: HOSPADM

## 2024-07-04 RX ORDER — LISINOPRIL 5 MG/1
5 TABLET ORAL DAILY
Status: DISCONTINUED | OUTPATIENT
Start: 2024-07-05 | End: 2024-07-05 | Stop reason: HOSPADM

## 2024-07-04 RX ORDER — LURASIDONE HYDROCHLORIDE 80 MG/1
80 TABLET, FILM COATED ORAL
COMMUNITY

## 2024-07-04 RX ORDER — AMOXICILLIN 250 MG
1 CAPSULE ORAL 2 TIMES DAILY PRN
Status: DISCONTINUED | OUTPATIENT
Start: 2024-07-04 | End: 2024-07-05 | Stop reason: HOSPADM

## 2024-07-04 RX ORDER — LURASIDONE HYDROCHLORIDE 40 MG/1
80 TABLET, FILM COATED ORAL
Status: DISCONTINUED | OUTPATIENT
Start: 2024-07-04 | End: 2024-07-05 | Stop reason: HOSPADM

## 2024-07-04 RX ORDER — SODIUM BICARBONATE 650 MG/1
1300 TABLET ORAL 2 TIMES DAILY
COMMUNITY

## 2024-07-04 RX ORDER — AMOXICILLIN 250 MG
2 CAPSULE ORAL 2 TIMES DAILY PRN
Status: DISCONTINUED | OUTPATIENT
Start: 2024-07-04 | End: 2024-07-05 | Stop reason: HOSPADM

## 2024-07-04 RX ORDER — ONDANSETRON 4 MG/1
4 TABLET, ORALLY DISINTEGRATING ORAL EVERY 6 HOURS PRN
Status: DISCONTINUED | OUTPATIENT
Start: 2024-07-04 | End: 2024-07-05 | Stop reason: HOSPADM

## 2024-07-04 RX ORDER — CHLORPROMAZINE HYDROCHLORIDE 100 MG/1
300 TABLET, FILM COATED ORAL AT BEDTIME
Status: DISCONTINUED | OUTPATIENT
Start: 2024-07-04 | End: 2024-07-04

## 2024-07-04 RX ORDER — CLOZAPINE 100 MG/1
300 TABLET ORAL AT BEDTIME
Status: ON HOLD | COMMUNITY
End: 2024-07-04

## 2024-07-04 RX ORDER — ATORVASTATIN CALCIUM 10 MG/1
10 TABLET, FILM COATED ORAL AT BEDTIME
COMMUNITY

## 2024-07-04 RX ORDER — LURASIDONE HYDROCHLORIDE 20 MG/1
20 TABLET, FILM COATED ORAL DAILY
Status: DISCONTINUED | OUTPATIENT
Start: 2024-07-04 | End: 2024-07-04 | Stop reason: DRUGHIGH

## 2024-07-04 RX ORDER — ACETAMINOPHEN 500 MG
1000 TABLET ORAL EVERY 6 HOURS PRN
Status: ON HOLD | COMMUNITY
End: 2024-07-04

## 2024-07-04 RX ORDER — ONDANSETRON 2 MG/ML
4 INJECTION INTRAMUSCULAR; INTRAVENOUS EVERY 30 MIN PRN
Status: DISCONTINUED | OUTPATIENT
Start: 2024-07-04 | End: 2024-07-04

## 2024-07-04 RX ORDER — ONDANSETRON 2 MG/ML
4 INJECTION INTRAMUSCULAR; INTRAVENOUS EVERY 6 HOURS PRN
Status: DISCONTINUED | OUTPATIENT
Start: 2024-07-04 | End: 2024-07-05 | Stop reason: HOSPADM

## 2024-07-04 RX ORDER — PHENYTOIN SODIUM 100 MG/1
200 CAPSULE, EXTENDED RELEASE ORAL 2 TIMES DAILY
Status: DISCONTINUED | OUTPATIENT
Start: 2024-07-04 | End: 2024-07-04

## 2024-07-04 RX ORDER — POLYETHYLENE GLYCOL 3350 17 G
2 POWDER IN PACKET (EA) ORAL
COMMUNITY

## 2024-07-04 RX ORDER — CHLORPROMAZINE HYDROCHLORIDE 200 MG/1
200 TABLET, FILM COATED ORAL DAILY
Status: DISCONTINUED | OUTPATIENT
Start: 2024-07-04 | End: 2024-07-04

## 2024-07-04 RX ORDER — SUMATRIPTAN 100 MG/1
100 TABLET, FILM COATED ORAL
COMMUNITY

## 2024-07-04 RX ORDER — AMOXICILLIN 250 MG
1 CAPSULE ORAL 2 TIMES DAILY PRN
COMMUNITY

## 2024-07-04 RX ORDER — DEXTROSE MONOHYDRATE AND SODIUM CHLORIDE 5; .45 G/100ML; G/100ML
1000 INJECTION, SOLUTION INTRAVENOUS CONTINUOUS PRN
Status: DISCONTINUED | OUTPATIENT
Start: 2024-07-04 | End: 2024-07-04

## 2024-07-04 RX ORDER — CLONIDINE HYDROCHLORIDE 0.1 MG/1
0.1 TABLET ORAL 2 TIMES DAILY
Status: DISCONTINUED | OUTPATIENT
Start: 2024-07-04 | End: 2024-07-05 | Stop reason: HOSPADM

## 2024-07-04 RX ORDER — CLOZAPINE 100 MG/1
300 TABLET, ORALLY DISINTEGRATING ORAL AT BEDTIME
COMMUNITY

## 2024-07-04 RX ORDER — BENZTROPINE MESYLATE 0.5 MG/1
0.5 TABLET ORAL 2 TIMES DAILY
Status: DISCONTINUED | OUTPATIENT
Start: 2024-07-04 | End: 2024-07-04

## 2024-07-04 RX ORDER — PANTOPRAZOLE SODIUM 40 MG/1
40 TABLET, DELAYED RELEASE ORAL 2 TIMES DAILY
Status: DISCONTINUED | OUTPATIENT
Start: 2024-07-04 | End: 2024-07-05 | Stop reason: HOSPADM

## 2024-07-04 RX ORDER — ACETAMINOPHEN 500 MG
1000 TABLET ORAL 3 TIMES DAILY
COMMUNITY

## 2024-07-04 RX ORDER — TRAZODONE HYDROCHLORIDE 50 MG/1
100 TABLET, FILM COATED ORAL AT BEDTIME
Status: DISCONTINUED | OUTPATIENT
Start: 2024-07-04 | End: 2024-07-04

## 2024-07-04 RX ORDER — ONDANSETRON 4 MG/1
4 TABLET, ORALLY DISINTEGRATING ORAL EVERY 8 HOURS PRN
COMMUNITY

## 2024-07-04 RX ORDER — ALBUTEROL SULFATE 90 UG/1
2 AEROSOL, METERED RESPIRATORY (INHALATION) 4 TIMES DAILY PRN
Status: DISCONTINUED | OUTPATIENT
Start: 2024-07-04 | End: 2024-07-05 | Stop reason: HOSPADM

## 2024-07-04 RX ADMIN — INSULIN HUMAN 1.5 UNITS/HR: 1 INJECTION, SOLUTION INTRAVENOUS at 18:00

## 2024-07-04 RX ADMIN — PANTOPRAZOLE SODIUM 40 MG: 40 TABLET, DELAYED RELEASE ORAL at 21:34

## 2024-07-04 RX ADMIN — DEXTROSE AND SODIUM CHLORIDE 1000 ML: 5; 450 INJECTION, SOLUTION INTRAVENOUS at 17:57

## 2024-07-04 RX ADMIN — CLONIDINE HYDROCHLORIDE 0.1 MG: 0.1 TABLET ORAL at 21:34

## 2024-07-04 RX ADMIN — TOPIRAMATE 100 MG: 25 TABLET, FILM COATED ORAL at 21:34

## 2024-07-04 RX ADMIN — DESMOPRESSIN ACETATE 0.2 MG: 0.1 TABLET ORAL at 21:34

## 2024-07-04 RX ADMIN — SODIUM CHLORIDE: 9 INJECTION, SOLUTION INTRAVENOUS at 22:25

## 2024-07-04 RX ADMIN — SODIUM CHLORIDE 1000 ML: 9 INJECTION, SOLUTION INTRAVENOUS at 16:26

## 2024-07-04 RX ADMIN — ONDANSETRON 4 MG: 2 INJECTION INTRAMUSCULAR; INTRAVENOUS at 16:26

## 2024-07-04 ASSESSMENT — COLUMBIA-SUICIDE SEVERITY RATING SCALE - C-SSRS
6. HAVE YOU EVER DONE ANYTHING, STARTED TO DO ANYTHING, OR PREPARED TO DO ANYTHING TO END YOUR LIFE?: NO
2. HAVE YOU ACTUALLY HAD ANY THOUGHTS OF KILLING YOURSELF IN THE PAST MONTH?: NO
1. IN THE PAST MONTH, HAVE YOU WISHED YOU WERE DEAD OR WISHED YOU COULD GO TO SLEEP AND NOT WAKE UP?: NO

## 2024-07-04 ASSESSMENT — ACTIVITIES OF DAILY LIVING (ADL)
ADLS_ACUITY_SCORE: 37
ADLS_ACUITY_SCORE: 36
ADLS_ACUITY_SCORE: 37
ADLS_ACUITY_SCORE: 35
ADLS_ACUITY_SCORE: 37
ADLS_ACUITY_SCORE: 34
ADLS_ACUITY_SCORE: 37
ADLS_ACUITY_SCORE: 36
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
## 2024-07-04 NOTE — ED NOTES
"[facility]  ED Nurse Handoff Report    ED Chief complaint: Loss of Consciousness  . ED Diagnosis:   Final diagnoses:   Nausea and vomiting, unspecified vomiting type   Acidosis       Allergies:   Allergies   Allergen Reactions    Haloperidol Other (See Comments)     Other reaction(s): Loss of Vision, Tongue Swelling  Per Pt report  \"My eyes would roll up\"      Lithium Hives, Other (See Comments), Rash and Swelling    Olanzapine Rash     Increased blood glucose  Other reaction(s): Throat Swelling/Closing    Quetiapine Hives and Swelling     Other reaction(s): Angioedema, Throat Swelling/Closing    Amphetamine-Dextroamphetamine Headache and Other (See Comments)     Felt like face was swollen  Felt like face was swollen  Felt like face was swollen      Iodinated Contrast Media Other (See Comments) and Swelling     Pt arm swollen from contrast    Trazodone Itching    Peanut [Peanut Oil] Unknown    Risperdal [Risperidone] Unknown    Valproic Acid      Other reaction(s): Headache       Code Status: Full Code    Activity level - Baseline/Home:  independent.  Activity Level - Current:   standby.   Lift room needed: No.   Bariatric: No   Needed: No   Isolation: No.   Infection: Not Applicable.     Respiratory status: Room air    Vital Signs (within 30 minutes):   Vitals:    07/04/24 1545 07/04/24 1600 07/04/24 1615 07/04/24 1630   BP: 107/64 108/66 106/73 108/68   Pulse:  98 90 90   Resp:  16     Temp:       TempSrc:       SpO2:  96% 96% 99%   Weight:       Height:           Cardiac Rhythm:  ,      Pain level:    Patient confused: No.   Patient Falls Risk: patient and family education.   Elimination Status: Has voided     Patient Report - Initial Complaint: loss of consciousness.   Focused Assessment: Eric Fall is a 28 year old male from group home who has a history of diabetes along with migraine headache.  He was brought in by [facility] staff given that he has been feeling " sick all day and woke up lightheaded.  He states that he felt weak all over and that may have started yesterday.  He did play basketball yesterday and fell little winded.  He is very vague on some his complaints as he talks about having chronic abdominal pain but then states that his abdominal pain today is new.  He states that he vomited 3 times today although is nonbilious nonbloody.  He feels nauseous right now.  He does have a history of frequent migraines although he does not feel right now he has a bad headache.  He did pass out while feeling lightheaded.  He denies hitting his head on the floor.  Blood sugar has been elevated in 200s which she states is slightly more elevated than normal.  Denies any fevers and chills.  His group home staff did take his temperature this morning and it was 99.9.  He denies any sick contacts or recent travel.  Denies any diarrhea but states he is not eating and drinking well today.  Denies cough and runny nose.       Abnormal Results:   Labs Ordered and Resulted from Time of ED Arrival to Time of ED Departure   GLUCOSE BY METER - Abnormal       Result Value    GLUCOSE BY METER POCT 267 (*)    COMPREHENSIVE METABOLIC PANEL - Abnormal    Sodium 136      Potassium 4.2      Carbon Dioxide (CO2) 19 (*)     Anion Gap 13      Urea Nitrogen 10.4      Creatinine 0.91      GFR Estimate >90      Calcium 9.1      Chloride 104      Glucose 286 (*)     Alkaline Phosphatase 138      AST 25      ALT 30      Protein Total 7.7      Albumin 4.6      Bilirubin Total 0.3     ROUTINE UA WITH MICROSCOPIC REFLEX TO CULTURE - Abnormal    Color Urine Light Yellow      Appearance Urine Clear      Glucose Urine >=1000 (*)     Bilirubin Urine Negative      Ketones Urine Negative      Specific Gravity Urine 1.027      Blood Urine Negative      pH Urine 7.5 (*)     Protein Albumin Urine Negative      Urobilinogen Urine Normal      Nitrite Urine Negative      Leukocyte Esterase Urine Negative      RBC Urine  <1      WBC Urine 1     BLOOD GAS VENOUS - Abnormal    pH Venous 7.29 (*)     pCO2 Venous 50      pO2 Venous 34      Bicarbonate Venous 24      Base Excess/Deficit Venous -2.6      FIO2 98      Oxyhemoglobin Venous 57 (*)     O2 Sat, Venous 59.5 (*)    CBC WITH PLATELETS AND DIFFERENTIAL - Abnormal    WBC Count 9.1      RBC Count 4.08 (*)     Hemoglobin 11.7 (*)     Hematocrit 37.1 (*)     MCV 91      MCH 28.7      MCHC 31.5      RDW 14.8      Platelet Count 290      % Neutrophils 80      % Lymphocytes 15      % Monocytes 4      % Eosinophils 1      % Basophils 1      % Immature Granulocytes 0      NRBCs per 100 WBC 0      Absolute Neutrophils 7.3      Absolute Lymphocytes 1.4      Absolute Monocytes 0.3      Absolute Eosinophils 0.1      Absolute Basophils 0.1      Absolute Immature Granulocytes 0.0      Absolute NRBCs 0.0     HEMOGLOBIN A1C - Abnormal    Hemoglobin A1C 6.7 (*)    GLUCOSE BY METER - Abnormal    GLUCOSE BY METER POCT 199 (*)    LACTIC ACID WHOLE BLOOD WITH 1X REPEAT IN 2 HR WHEN >2 - Normal    Lactic Acid, Initial 1.5     TROPONIN T, HIGH SENSITIVITY - Normal    Troponin T, High Sensitivity 8     MAGNESIUM - Normal    Magnesium 1.9     D DIMER QUANTITATIVE - Normal    D-Dimer Quantitative 0.49     INFLUENZA A/B, RSV, & SARS-COV2 PCR - Normal    Influenza A PCR Negative      Influenza B PCR Negative      RSV PCR Negative      SARS CoV2 PCR Negative     KETONE BETA-HYDROXYBUTYRATE QUANTITATIVE, RAPID - Normal    Ketone (Beta-Hydroxybutyrate) Quantitative <0.18     GLUCOSE MONITOR NURSING POCT   OSMOLALITY        CT Abdomen Pelvis w/o Contrast   Preliminary Result   IMPRESSION:    1.  Moderate formed stool throughout the colon similar to the prior study. No obstruction or inflammation.   2.  Small esophageal hiatal hernia.   3.  Left renal cyst requires no follow-up.   4.  Small fat-containing ventral hernia. Remainder unremarkable.         XR Chest 2 Views   Final Result   IMPRESSION: Negative chest.  Low lung volumes.      CT Head w/o Contrast   Final Result   IMPRESSION:     1.  No evidence of acute intracranial hemorrhage or mass effect.          Treatments provided: see MAR  Family Comments: N/A  OBS brochure/video discussed/provided to patient:  Yes  ED Medications:   Medications   ondansetron (ZOFRAN) injection 4 mg (4 mg Intravenous $Given 7/4/24 1626)   sodium chloride 0.9% BOLUS 1,000 mL (1,000 mLs Intravenous $New Bag 7/4/24 1626)       Drips infusing:  Yes  For the majority of the shift this patient was Green.   Interventions performed were N/A.    Sepsis treatment initiated: No    Cares/treatment/interventions/medications to be completed following ED care: see inpatient admit orders.    ED Nurse Name: Darling Mendez RN  6:49 PM    RECEIVING UNIT ED HANDOFF REVIEW    Above ED Nurse Handoff Report was reviewed: Yes  Reviewed by: Clara Griffith RN on July 4, 2024 at 7:57 PM   REJI Hernández called the ED to inform them the note was read: Yes

## 2024-07-04 NOTE — ED TRIAGE NOTES
Pt presents to the ED with complaint of syncope at work. Pt Pt states he was eating and couldn't keep his eyes open. Pt states his body feels weak and fatigued. Pt states he vomited 3x before he came in. Pt states he did not fall when he passed out. Pt also complains of middle abd pain and headache pain 9/10. Pt has hx diabetes and migraines. BG in triage 267

## 2024-07-04 NOTE — ED PROVIDER NOTES
Emergency Department Note      History of Present Illness     Chief Complaint   Loss of Consciousness      HPI   Eric Fall is a 28 year old male from group home who has a history of diabetes along with migraine headache.  He was brought in by senior care staff given that he has been feeling sick all day and woke up lightheaded.  He states that he felt weak all over and that may have started yesterday.  He did play basketball yesterday and fell little winded.  He is very vague on some his complaints as he talks about having chronic abdominal pain but then states that his abdominal pain today is new.  He states that he vomited 3 times today although is nonbilious nonbloody.  He feels nauseous right now.  He does have a history of frequent migraines although he does not feel right now he has a bad headache.  He did pass out while feeling lightheaded.  He denies hitting his head on the floor.  Blood sugar has been elevated in 200s which she states is slightly more elevated than normal.  Denies any fevers and chills.  His group home staff did take his temperature this morning and it was 99.9.  He denies any sick contacts or recent travel.  Denies any diarrhea but states he is not eating and drinking well today.  Denies cough and runny nose.    Independent Historian   None    Review of External Notes   none    Past Medical History     Medical History and Problem List   Past Medical History:   Diagnosis Date    ADHD (attention deficit hyperactivity disorder)     Adult antisocial behavior     Chemical abuse     CKD (chronic kidney disease)     Conversion disorder 2011    Diabetes mellitus     Fetal alcohol syndrome     Hip dysplasia, congenital     Hypertension     Intermittent explosive disorder     Migraine     MR (mental retardation), moderate     Obesity     PTSD (post-traumatic stress disorder)     Schizoaffective disorder, bipolar type     Seizures 11/2021    TBI (traumatic brain injury) 2009    VUR  "(vesicoureteric reflux)        Medications   albuterol (PROVENTIL HFA;VENTOLIN HFA) 90 mcg/actuation inhaler  atorvastatin (LIPITOR) 20 MG tablet  atropine 1 % ophthalmic solution  benzonatate (TESSALON) 200 MG capsule  benztropine (COGENTIN) 0.5 MG tablet  chlorproMAZINE (THORAZINE) 100 MG tablet  chlorproMAZINE 200 MG TABS  cloNIDine (CATAPRES) 0.1 MG tablet  desmopressin (DDAVP) 0.2 MG tablet  fluticasone (FLONASE) 50 MCG/ACT nasal spray  fluticasone (FLONASE) 50 MCG/ACT nasal spray  gabapentin (NEURONTIN) 300 MG capsule  hydrOXYzine (ATARAX) 50 MG tablet  lisinopril (ZESTRIL) 5 MG tablet  lurasidone (LATUDA) 120 MG TABS tablet  lurasidone (LATUDA) 20 MG TABS tablet  melatonin 5 MG tablet  metFORMIN (GLUCOPHAGE) 500 MG tablet  pantoprazole (PROTONIX) 40 MG EC tablet  phenytoin (DILANTIN) 100 MG ER capsule  SUMAtriptan (IMITREX) 5 MG/ACT nasal spray  SUMAtriptan (IMITREX) 50 MG tablet  topiramate (TOPAMAX) 100 MG tablet  traZODone (DESYREL) 100 MG tablet        Surgical History   Past Surgical History:   Procedure Laterality Date    HIP SURGERY  03/2015    Hip surgery with pin placement for SCFE  2010       Physical Exam     Patient Vitals for the past 24 hrs:   BP Temp Temp src Pulse Resp SpO2 Height Weight   07/04/24 1459 (!) 146/97 98.1  F (36.7  C) Oral 109 18 100 % 1.702 m (5' 7\") 106.1 kg (234 lb)     Physical Exam  Constitutional:       Appearance: He is well-developed.   HENT:      Right Ear: External ear normal.      Left Ear: External ear normal.      Mouth/Throat:      Mouth: Mucous membranes are dry.      Pharynx: Oropharynx is clear. No oropharyngeal exudate or posterior oropharyngeal erythema.   Eyes:      General: No scleral icterus.     Extraocular Movements: Extraocular movements intact.      Conjunctiva/sclera: Conjunctivae normal.      Pupils: Pupils are equal, round, and reactive to light.   Cardiovascular:      Rate and Rhythm: Normal rate and regular rhythm.      Heart sounds: Normal heart " sounds. No murmur heard.     No friction rub. No gallop.   Pulmonary:      Effort: Pulmonary effort is normal. No respiratory distress.      Breath sounds: Normal breath sounds. No wheezing or rales.   Abdominal:      General: Bowel sounds are normal. There is no distension.      Palpations: Abdomen is soft. There is no mass.      Tenderness: There is abdominal tenderness. There is no right CVA tenderness, left CVA tenderness, guarding or rebound.      Comments: Mild diffuse abd TTP   Musculoskeletal:         General: Normal range of motion.   Skin:     General: Skin is warm and dry.      Capillary Refill: Capillary refill takes less than 2 seconds.      Findings: No rash.   Neurological:      Mental Status: He is alert.           Diagnostics     Lab Results   Labs Ordered and Resulted from Time of ED Arrival to Time of ED Departure   GLUCOSE BY METER - Abnormal       Result Value    GLUCOSE BY METER POCT 267 (*)    COMPREHENSIVE METABOLIC PANEL - Abnormal    Sodium 136      Potassium 4.2      Carbon Dioxide (CO2) 19 (*)     Anion Gap 13      Urea Nitrogen 10.4      Creatinine 0.91      GFR Estimate >90      Calcium 9.1      Chloride 104      Glucose 286 (*)     Alkaline Phosphatase 138      AST 25      ALT 30      Protein Total 7.7      Albumin 4.6      Bilirubin Total 0.3     ROUTINE UA WITH MICROSCOPIC REFLEX TO CULTURE - Abnormal    Color Urine Light Yellow      Appearance Urine Clear      Glucose Urine >=1000 (*)     Bilirubin Urine Negative      Ketones Urine Negative      Specific Gravity Urine 1.027      Blood Urine Negative      pH Urine 7.5 (*)     Protein Albumin Urine Negative      Urobilinogen Urine Normal      Nitrite Urine Negative      Leukocyte Esterase Urine Negative      RBC Urine <1      WBC Urine 1     BLOOD GAS VENOUS - Abnormal    pH Venous 7.29 (*)     pCO2 Venous 50      pO2 Venous 34      Bicarbonate Venous 24      Base Excess/Deficit Venous -2.6      FIO2 98      Oxyhemoglobin Venous 57  (*)     O2 Sat, Venous 59.5 (*)    CBC WITH PLATELETS AND DIFFERENTIAL - Abnormal    WBC Count 9.1      RBC Count 4.08 (*)     Hemoglobin 11.7 (*)     Hematocrit 37.1 (*)     MCV 91      MCH 28.7      MCHC 31.5      RDW 14.8      Platelet Count 290      % Neutrophils 80      % Lymphocytes 15      % Monocytes 4      % Eosinophils 1      % Basophils 1      % Immature Granulocytes 0      NRBCs per 100 WBC 0      Absolute Neutrophils 7.3      Absolute Lymphocytes 1.4      Absolute Monocytes 0.3      Absolute Eosinophils 0.1      Absolute Basophils 0.1      Absolute Immature Granulocytes 0.0      Absolute NRBCs 0.0     HEMOGLOBIN A1C - Abnormal    Hemoglobin A1C 6.7 (*)    GLUCOSE BY METER - Abnormal    GLUCOSE BY METER POCT 199 (*)    LACTIC ACID WHOLE BLOOD WITH 1X REPEAT IN 2 HR WHEN >2 - Normal    Lactic Acid, Initial 1.5     TROPONIN T, HIGH SENSITIVITY - Normal    Troponin T, High Sensitivity 8     MAGNESIUM - Normal    Magnesium 1.9     D DIMER QUANTITATIVE - Normal    D-Dimer Quantitative 0.49     INFLUENZA A/B, RSV, & SARS-COV2 PCR - Normal    Influenza A PCR Negative      Influenza B PCR Negative      RSV PCR Negative      SARS CoV2 PCR Negative     KETONE BETA-HYDROXYBUTYRATE QUANTITATIVE, RAPID - Normal    Ketone (Beta-Hydroxybutyrate) Quantitative <0.18     GLUCOSE MONITOR NURSING POCT   OSMOLALITY       Imaging   CT Abdomen Pelvis w/o Contrast   Preliminary Result   IMPRESSION:    1.  Moderate formed stool throughout the colon similar to the prior study. No obstruction or inflammation.   2.  Small esophageal hiatal hernia.   3.  Left renal cyst requires no follow-up.   4.  Small fat-containing ventral hernia. Remainder unremarkable.         XR Chest 2 Views   Final Result   IMPRESSION: Negative chest. Low lung volumes.      CT Head w/o Contrast   Final Result   IMPRESSION:     1.  No evidence of acute intracranial hemorrhage or mass effect.          EKG   ECG taken at 1500, ECG read at 1507  Sinus  tachycardia without ST changes  Rate 208 bpm. OR interval 150 ms. QRS duration 76 ms. QT/QTc 328/439 ms. P-R-T axes 52 23 46.    Independent Interpretation   None    ED Course      Medications Administered   Medications   ondansetron (ZOFRAN) injection 4 mg (4 mg Intravenous $Given 7/4/24 5472)   sodium chloride 0.9% BOLUS 1,000 mL (1,000 mLs Intravenous $New Bag 7/4/24 1624)       Procedures   Procedures     Discussion of Management   Admitting Hospitalist,     ED Course    9610 Exam    Optional/Additional Documentation  None    Medical Decision Making / Diagnosis     CMS Diagnoses: None    MIPS       None    MDM   Eric Fall is a 28 year old male with multiple medical problem who presents with nausea and vomiting along with lightheadedness and dizziness.  Patient was initially found to be hyperglycemic.  Initial pH was slightly acidotic at 7.29.  Ketones was negative and lactic acid was in the normal range.  This makes DKA less likely.  It is possible that this may be starvation ketosis given the vomiting.  Uncertain what is the cause of all this.  Patient denies any severe headache so not likely a migraine headache that caused the vomiting.  He is afebrile here.  He otherwise appears improved.  Given the acidosis, patient requires admission for monitoring and continue IV fluid.  We will hold off on insulin drip for now given that we believe this is not likely to be DKA.  Patient admitted to observation telemetry under Dr. Hernández.    Disposition   The patient was admitted to the hospital.     Diagnosis     ICD-10-CM    1. Nausea and vomiting, unspecified vomiting type  R11.2       2. Acidosis  E87.20                    MD Chiki Douglas Wenlan, MD  07/04/24 3074

## 2024-07-05 VITALS
HEART RATE: 80 BPM | RESPIRATION RATE: 16 BRPM | DIASTOLIC BLOOD PRESSURE: 61 MMHG | HEIGHT: 67 IN | OXYGEN SATURATION: 97 % | SYSTOLIC BLOOD PRESSURE: 118 MMHG | BODY MASS INDEX: 36.78 KG/M2 | TEMPERATURE: 98.1 F | WEIGHT: 234.3 LBS

## 2024-07-05 LAB
ALBUMIN SERPL BCG-MCNC: 4.1 G/DL (ref 3.5–5.2)
ALP SERPL-CCNC: 126 U/L (ref 40–150)
ALT SERPL W P-5'-P-CCNC: 24 U/L (ref 0–70)
ANION GAP SERPL CALCULATED.3IONS-SCNC: 12 MMOL/L (ref 7–15)
AST SERPL W P-5'-P-CCNC: 18 U/L (ref 0–45)
ATRIAL RATE - MUSE: 108 BPM
BILIRUB SERPL-MCNC: 0.3 MG/DL
BUN SERPL-MCNC: 9.1 MG/DL (ref 6–20)
CALCIUM SERPL-MCNC: 8.8 MG/DL (ref 8.6–10)
CHLORIDE SERPL-SCNC: 106 MMOL/L (ref 98–107)
CREAT SERPL-MCNC: 0.85 MG/DL (ref 0.67–1.17)
DEPRECATED HCO3 PLAS-SCNC: 19 MMOL/L (ref 22–29)
DIASTOLIC BLOOD PRESSURE - MUSE: NORMAL MMHG
EGFRCR SERPLBLD CKD-EPI 2021: >90 ML/MIN/1.73M2
ERYTHROCYTE [DISTWIDTH] IN BLOOD BY AUTOMATED COUNT: 14.6 % (ref 10–15)
GLUCOSE BLDC GLUCOMTR-MCNC: 163 MG/DL (ref 70–99)
GLUCOSE BLDC GLUCOMTR-MCNC: 271 MG/DL (ref 70–99)
GLUCOSE SERPL-MCNC: 191 MG/DL (ref 70–99)
HCT VFR BLD AUTO: 34.5 % (ref 40–53)
HGB BLD-MCNC: 10.8 G/DL (ref 13.3–17.7)
INTERPRETATION ECG - MUSE: NORMAL
MCH RBC QN AUTO: 28.8 PG (ref 26.5–33)
MCHC RBC AUTO-ENTMCNC: 31.3 G/DL (ref 31.5–36.5)
MCV RBC AUTO: 92 FL (ref 78–100)
P AXIS - MUSE: 52 DEGREES
PLATELET # BLD AUTO: 254 10E3/UL (ref 150–450)
POTASSIUM SERPL-SCNC: 4.1 MMOL/L (ref 3.4–5.3)
PR INTERVAL - MUSE: 150 MS
PROT SERPL-MCNC: 6.7 G/DL (ref 6.4–8.3)
QRS DURATION - MUSE: 76 MS
QT - MUSE: 328 MS
QTC - MUSE: 439 MS
R AXIS - MUSE: 23 DEGREES
RBC # BLD AUTO: 3.75 10E6/UL (ref 4.4–5.9)
SODIUM SERPL-SCNC: 137 MMOL/L (ref 135–145)
SYSTOLIC BLOOD PRESSURE - MUSE: NORMAL MMHG
T AXIS - MUSE: 46 DEGREES
VENTRICULAR RATE- MUSE: 108 BPM
WBC # BLD AUTO: 5.5 10E3/UL (ref 4–11)

## 2024-07-05 PROCEDURE — 85027 COMPLETE CBC AUTOMATED: CPT | Performed by: STUDENT IN AN ORGANIZED HEALTH CARE EDUCATION/TRAINING PROGRAM

## 2024-07-05 PROCEDURE — 80053 COMPREHEN METABOLIC PANEL: CPT | Performed by: STUDENT IN AN ORGANIZED HEALTH CARE EDUCATION/TRAINING PROGRAM

## 2024-07-05 PROCEDURE — 82962 GLUCOSE BLOOD TEST: CPT

## 2024-07-05 PROCEDURE — G0378 HOSPITAL OBSERVATION PER HR: HCPCS

## 2024-07-05 PROCEDURE — 250N000013 HC RX MED GY IP 250 OP 250 PS 637: Performed by: STUDENT IN AN ORGANIZED HEALTH CARE EDUCATION/TRAINING PROGRAM

## 2024-07-05 PROCEDURE — 36415 COLL VENOUS BLD VENIPUNCTURE: CPT | Performed by: STUDENT IN AN ORGANIZED HEALTH CARE EDUCATION/TRAINING PROGRAM

## 2024-07-05 PROCEDURE — 99239 HOSP IP/OBS DSCHRG MGMT >30: CPT | Performed by: NURSE PRACTITIONER

## 2024-07-05 RX ORDER — ONDANSETRON 4 MG/1
4 TABLET, ORALLY DISINTEGRATING ORAL EVERY 6 HOURS PRN
Qty: 20 TABLET | Refills: 1 | Status: SHIPPED | OUTPATIENT
Start: 2024-07-05

## 2024-07-05 RX ADMIN — TOPIRAMATE 100 MG: 25 TABLET, FILM COATED ORAL at 08:42

## 2024-07-05 RX ADMIN — PANTOPRAZOLE SODIUM 40 MG: 40 TABLET, DELAYED RELEASE ORAL at 08:42

## 2024-07-05 RX ADMIN — CLONIDINE HYDROCHLORIDE 0.1 MG: 0.1 TABLET ORAL at 08:42

## 2024-07-05 RX ADMIN — LISINOPRIL 5 MG: 5 TABLET ORAL at 08:42

## 2024-07-05 ASSESSMENT — ACTIVITIES OF DAILY LIVING (ADL)
ADLS_ACUITY_SCORE: 36

## 2024-07-05 NOTE — PLAN OF CARE
ROOM # 204-2    Living Situation (if not independent, order SW consult): Group home  Facility name:  : [facility]     Activity level at baseline: IND  Activity level on admit: SBA     Who will be transporting you at discharge: TBD    Patient registered to observation; given Patient Bill of Rights; given the opportunity to ask questions about observation status and their plan of care.  Patient has been oriented to the observation room, bathroom and call light is in place.    Discussed discharge goals and expectations with patient/family.

## 2024-07-05 NOTE — PHARMACY-ADMISSION MEDICATION HISTORY
Pharmacist Admission Medication History    Admission medication history is complete. The information provided in this note is only as accurate as the sources available at the time of the update.    Information Source(s): Facility (TCU/NH/) medication list/MAR from [facility] in [facility].    Pertinent Information: last doses not indicated on Med list.    Changes made to PTA medication list:  Added: Certavits, Clozapine, Linzess, Sodium Bicarb, Nicotine disha prn, Tizanidine prn  Deleted: Albuterol hfa prn, Benzonatate prn; Chlorpromazine, Flonase, Gabapentin, Hydroxyzine, Phenytoin, Trazodone  Changed: Atorvastatin, Lurazidone, Melatonin,    Allergies reviewed with patient and updates made in EHR: no    Medication History Completed By: Korey Gamboa RPH 7/4/2024 11:08 PM    PTA Med List   Medication Sig Last Dose    acetaminophen (TYLENOL) 500 MG tablet Take 1,000 mg by mouth 3 times daily     atorvastatin (LIPITOR) 10 MG tablet Take 10 mg by mouth at bedtime     atropine 1 % ophthalmic solution Place 1 drop under the tongue 2 times daily     cloNIDine (CATAPRES) 0.1 MG tablet Take 1 tablet (0.1 mg) by mouth 2 times daily     cloZAPine (FAZACLO) 100 MG ODT Take 300 mg by mouth at bedtime Dissolve and swallow     desmopressin (DDAVP) 0.2 MG tablet Take 0.2 mg by mouth At Bedtime     linaclotide (LINZESS) 290 MCG capsule Take 290 mcg by mouth every morning (before breakfast)     lisinopril (ZESTRIL) 5 MG tablet Take 5 mg by mouth daily     lurasidone (LATUDA) 80 MG TABS tablet Take 80 mg by mouth daily (with dinner) Take with full meal of at least 350 calories     melatonin 5 MG tablet Take 5 mg by mouth at bedtime     metFORMIN (GLUCOPHAGE) 500 MG tablet Take 500 mg by mouth 2 times daily (with meals)     Multiple Vitamins-Minerals (CERTA-JUAN-LUTEIN PO) Take 1 tablet by mouth daily     nicotine (COMMIT) 2 MG lozenge Place 2 mg inside cheek every hour as needed for nicotine withdrawal symptoms Place  1 lozenge in between cheek and gum every 1 hour while awake as needed for nicotine craving     ondansetron (ZOFRAN ODT) 4 MG ODT tab Take 4 mg by mouth every 8 hours as needed for nausea     pantoprazole (PROTONIX) 40 MG EC tablet Take 40 mg by mouth 2 times daily (before meals)     senna-docusate (SENOKOT-S/PERICOLACE) 8.6-50 MG tablet Take 1 tablet by mouth 2 times daily as needed for constipation     sodium bicarbonate 650 MG tablet Take 1,300 mg by mouth 2 times daily     SUMAtriptan (IMITREX) 100 MG tablet Take 100 mg by mouth at onset of headache for migraine may repeat after 2 hours if needed;  mg/24 hours     tiZANidine (ZANAFLEX) 4 MG tablet Take 4 mg by mouth 3 times daily as needed for muscle spasms     topiramate (TOPAMAX) 100 MG tablet Take 100 mg by mouth 2 times daily

## 2024-07-05 NOTE — PLAN OF CARE
"PRIMARY DIAGNOSIS: Syncopal episode    OUTPATIENT/OBSERVATION GOALS TO BE MET BEFORE DISCHARGE  1. Orthostatic performed: No    2. Tolerating PO medications: Yes    3. Return to near baseline physical activity: Yes    4. Cleared for discharge by consultants (if involved): No    Discharge Planner Nurse   Safe discharge environment identified: Yes  Barriers to discharge: Yes       Entered by: Jama Wright RN 07/05/2024 4:30 AM     Please review provider order for any additional goals.   Nurse to notify provider when observation goals have been met and patient is ready for discharge.        Pt is Aox4 and calm. Pt up SBA in room and is running NS at 75mL/hr in peripheral IV. Telemetry showed sinus rhythm at 91 bpm. No complaints of pain, nausea, or discomfort at this time. Pt was tolerating food well at start of shift. Pt lives at group home and will likely discharge there when medically ready. Continue to follow plan of care.     /88 (BP Location: Right arm)   Pulse 83   Temp 98.2  F (36.8  C)   Resp 18   Ht 1.702 m (5' 7\")   Wt 106.3 kg (234 lb 4.8 oz)   SpO2 98%   BMI 36.70 kg/m     "

## 2024-07-05 NOTE — PLAN OF CARE
PRIMARY DIAGNOSIS: Syncopal episode    OUTPATIENT/OBSERVATION GOALS TO BE MET BEFORE DISCHARGE  1. Orthostatic performed: No    2. Tolerating PO medications: Yes    3. Return to near baseline physical activity: Yes    4. Cleared for discharge by consultants (if involved): No    Discharge Planner Nurse   Safe discharge environment identified: Yes  Barriers to discharge: Yes       Entered by: Jama Wright RN 07/05/2024 1:34 AM     Please review provider order for any additional goals.   Nurse to notify provider when observation goals have been met and patient is ready for discharge.    Pt is Aox4 and calm. Pt reports some stomach discomfort but says it might be because he is hungry. Baskin was requested but pt was asleep upon return to the room. NS infusing at 75mL/hr. Pt ambulates SBA to the bathroom. Continue to follow plan of care.

## 2024-07-05 NOTE — DISCHARGE SUMMARY
"[facility]  Hospitalist Discharge Summary      Date of Admission:  7/4/2024  Date of Discharge:  7/5/2024  Discharging Provider: EDUIN Mcmillan CNP  Discharge Service: Hospitalist Service    Discharge Diagnoses   See below    Clinically Significant Risk Factors     # DMII: A1C = 6.7 % (Ref range: <5.7 %) within past 6 months  # Obesity: Estimated body mass index is 36.7 kg/m  as calculated from the following:    Height as of this encounter: 1.702 m (5' 7\").    Weight as of this encounter: 106.3 kg (234 lb 4.8 oz).       Follow-ups Needed After Discharge   Follow-up Appointments     Follow-up and recommended labs and tests       Follow up with primary care provider, Jessica Wright DO, within 7   days for hospital follow- up.  The following labs/tests are recommended:   BMP, CBC.            Unresulted Labs Ordered in the Past 30 Days of this Admission       No orders found for last 31 day(s).        These results will be followed up by     Discharge Disposition   Discharged to home  Condition at discharge: Stable    Hospital Course   Eric Fall is a 28 year old male admitted on 7/4/2024. He has a history of type 2 diabetes mellitus, migraines, TBI, seizure disorder, schizophrenia presented to the ED for evaluation of generalized weakness, abdominal pain/vomiting, syncopal episode.  Generally not been feeling well for several days prior to admission.  Started with bodyaches and weakness, developed abdominal pain and vomiting day of admission.  Did have 1 episode where he felt very lightheaded, passed out, denies hitting his head.  Blood sugars been slightly more elevated than usual, in the 200s but not higher than that.  Labs not consistent with DKA, HHS, or metformin toxicity.  Admitting to observation.     Syncopal episode  Nausea, abdominal pain  Reportedly has been feeling sick for several days.  Group home staff stated that he is generally just not been feeling well. "  Has felt generalized weakness since day prior to admission, developed abdominal pain and several episodes of nausea day of admission.  [facility] living manages all of his medications, so has been receiving these regularly.  No recent medication changes.  Did have an episode where he felt lightheaded and passed out day of admission.  Does not believe he hit his head when he fell after passing out.  COVID/influenza testing negative.  Unclear exactly what is causing patient's symptoms, possibly viral illness.  Received IV fluids in the ED, will continue maintenance fluids.  Admitting to observation overnight. Treated with IV fluids.  Repeat labs stable.  Encourage PO intake.  Symptoms resolved.        Metabolic acidosis  Admission labs showed a pH of 7.29 on VBG, bicarb slightly low at 19.  Normal anion gap.  Serum and urine ketones negative.  Lactic acid was also normal.  Differential included DKA, HHS, and metformin toxicity but labs are not consistent with any of these etiologies.  Received IV fluids in ED.  Tolerating PO intake.    Diabetes mellitus  Takes metformin PTA.  Hemoglobin A1c this admission was 6.7%.  Blood glucose on admission was 267.  Some initial concern for possible DKA given presenting symptoms, however labs are not consistent with this.  No ketones in the blood or urine, but urine glucose very elevated.  Also considered HHS, but glucose was only 250 so this is less likely.  For now we will order sliding scale insulin and every 4 hours blood sugar checks.  Okay to resume metformin.      History of TBI  Seizure disorder  Noted per chart review.  Continue topiramate, phenytoin.     Schizoaffective disorder, bipolar type  Both diagnoses found per chart review.  Takes several antipsychotic medications at home, no recent changes to these.  Continue clonidine, clozapine, lurasidone.      Hypertension  Continue lisinopril.    Consultations This Hospital Stay   CARE MANAGEMENT / SOCIAL WORK IP CONSULT    Code  Status   Full Code    Time Spent on this Encounter   I, EDUIN Mcmillan CNP, personally saw the patient today and spent greater than 30 minutes discharging this patient.       EDUIN Mcmillan CNP  [facility] OBSERVATION DEPT  [address] [place] [address]  Phone: [phone]  ______________________________________________________________________    Physical Exam   Vital Signs: Temp: 98.1  F (36.7  C) Temp src: Oral BP: 118/61 Pulse: 80   Resp: 16 SpO2: 97 % O2 Device: None (Room air)    Weight: 234 lbs 4.8 oz  GEN:   Alert, oriented x 3, appears comfortable, NAD.  NECK:   Supple ,no mass or thyromegaly   HEENT:  Normocephalic/atraumatic, no scleral icterus, no nasal discharge, mouth moist.  CV:   Regular rate and rhythm, no murmur or JVD.  S1 + S2 noted, no S3 or S4.  LUNGS:   Clear to auscultation bilaterally without rales/rhonchi/wheezing/retractions.  Symmetric chest rise on inhalation noted.  ABD:   Active bowel sounds, soft, non-tender/non-distended.  No rebound/guarding/rigidity.  EXT:   No edema.  No cyanosis.  No joint synovitis noted.  SKIN:   Dry to touch, no exanthems noted in the visualized areas.  Neurologic: Grossly intact,non focal. Spine:   Neuropsychiatric:  General: normal, calm and normal eye contact  Level of consciousness: alert / normal  Affect: normal  Orientation: oriented to self, place, time and situation        Primary Care Physician   Jessica Wright, DO    Discharge Orders      Reason for your hospital stay    Reported syncopal episode  Abdominal pain  Mild metabolic acidosis  T2DM  H of TBI and seizures  Schizoaffective Bipolar disorder  Hypertension    Work up was overall reassuring.  No cardiac cause for syncope noted.  Likely due to pain. Received IV fluids.  On day of discharge, labs were stable and he was ambulating without assistance and tolerating oral intake without any significant pain.     Follow-up and recommended labs and  tests     Follow up with primary care provider, Jessica Wright DO, within 7 days for hospital follow- up.  The following labs/tests are recommended: BMP, CBC.     Activity    Your activity upon discharge: activity as tolerated     When to contact your care team    Call your primary doctor if you have any of the following: any questions or concerns.     Diet    Follow this diet upon discharge: Orders Placed This Encounter      Regular Diet Adult       Significant Results and Procedures     CBC RESULTS:   Recent Labs   Lab Test 07/05/24 0523   WBC 5.5   RBC 3.75*   HGB 10.8*   HCT 34.5*   MCV 92   MCH 28.8   MCHC 31.3*   RDW 14.6           Last Comprehensive Metabolic Panel:  Sodium   Date Value Ref Range Status   07/05/2024 137 135 - 145 mmol/L Final   10/27/2020 135 133 - 144 mmol/L Final     Potassium   Date Value Ref Range Status   07/05/2024 4.1 3.4 - 5.3 mmol/L Final   06/04/2022 3.6 3.4 - 5.3 mmol/L Final   10/27/2020 4.1 3.4 - 5.3 mmol/L Final     Chloride   Date Value Ref Range Status   07/05/2024 106 98 - 107 mmol/L Final   06/04/2022 103 94 - 109 mmol/L Final   10/27/2020 104 94 - 109 mmol/L Final     Carbon Dioxide   Date Value Ref Range Status   10/27/2020 23 20 - 32 mmol/L Final     Carbon Dioxide (CO2)   Date Value Ref Range Status   07/05/2024 19 (L) 22 - 29 mmol/L Final   06/04/2022 24 20 - 32 mmol/L Final     Anion Gap   Date Value Ref Range Status   07/05/2024 12 7 - 15 mmol/L Final   06/04/2022 7 3 - 14 mmol/L Final   10/27/2020 8 3 - 14 mmol/L Final     Glucose   Date Value Ref Range Status   07/05/2024 191 (H) 70 - 99 mg/dL Final   06/04/2022 173 (H) 70 - 99 mg/dL Final   10/27/2020 218 (H) 70 - 99 mg/dL Final     GLUCOSE BY METER POCT   Date Value Ref Range Status   07/05/2024 271 (H) 70 - 99 mg/dL Final     Urea Nitrogen   Date Value Ref Range Status   07/05/2024 9.1 6.0 - 20.0 mg/dL Final   06/04/2022 10 7 - 30 mg/dL Final   10/27/2020 10 7 - 30 mg/dL Final     Creatinine   Date  Value Ref Range Status   07/05/2024 0.85 0.67 - 1.17 mg/dL Final   10/27/2020 0.84 0.66 - 1.25 mg/dL Final     GFR Estimate   Date Value Ref Range Status   07/05/2024 >90 >60 mL/min/1.73m2 Final     Comment:     eGFR calculated using 2021 CKD-EPI equation.   10/27/2020 >90 >60 mL/min/[1.73_m2] Final     Comment:     Non  GFR Calc  Starting 12/18/2018, serum creatinine based estimated GFR (eGFR) will be   calculated using the Chronic Kidney Disease Epidemiology Collaboration   (CKD-EPI) equation.       Calcium   Date Value Ref Range Status   07/05/2024 8.8 8.6 - 10.0 mg/dL Final   10/27/2020 8.7 8.5 - 10.1 mg/dL Final     Bilirubin Total   Date Value Ref Range Status   07/05/2024 0.3 <=1.2 mg/dL Final   10/23/2020 0.5 0.2 - 1.3 mg/dL Final     Alkaline Phosphatase   Date Value Ref Range Status   07/05/2024 126 40 - 150 U/L Final   10/23/2020 107 40 - 150 U/L Final     ALT   Date Value Ref Range Status   07/05/2024 24 0 - 70 U/L Final     Comment:     Reference intervals for this test were updated on 6/12/2023 to more accurately reflect our healthy population. There may be differences in the flagging of prior results with similar values performed with this method. Interpretation of those prior results can be made in the context of the updated reference intervals.     10/23/2020 63 0 - 70 U/L Final     AST   Date Value Ref Range Status   07/05/2024 18 0 - 45 U/L Final     Comment:     Reference intervals for this test were updated on 6/12/2023 to more accurately reflect our healthy population. There may be differences in the flagging of prior results with similar values performed with this method. Interpretation of those prior results can be made in the context of the updated reference intervals.   10/23/2020 35 0 - 45 U/L Final       EKG ST with a rate of 108.  No dynamic ST or T wave changes.        CT abdomen/pelvis without contrast:  small esophageal hiatal hernia and small fat containing ventral  hernia.  L renal cyst (no follow up required)    CXR: negative chest.    CT head: no evidence of acute intracranial hemorrhage or mass effect.           Discharge Medications   Current Discharge Medication List        START taking these medications    Details   !! ondansetron (ZOFRAN ODT) 4 MG ODT tab Take 1 tablet (4 mg) by mouth every 6 hours as needed for nausea or vomiting  Qty: 20 tablet, Refills: 1    Associated Diagnoses: Nausea and vomiting, unspecified vomiting type       !! - Potential duplicate medications found. Please discuss with provider.        CONTINUE these medications which have NOT CHANGED    Details   acetaminophen (TYLENOL) 500 MG tablet Take 1,000 mg by mouth 3 times daily      atorvastatin (LIPITOR) 10 MG tablet Take 10 mg by mouth at bedtime      atropine 1 % ophthalmic solution Place 1 drop under the tongue 2 times daily      cloNIDine (CATAPRES) 0.1 MG tablet Take 1 tablet (0.1 mg) by mouth 2 times daily  Qty: 60 tablet, Refills: 0    Associated Diagnoses: Aggression      cloZAPine (FAZACLO) 100 MG ODT Take 300 mg by mouth at bedtime Dissolve and swallow      desmopressin (DDAVP) 0.2 MG tablet Take 0.2 mg by mouth At Bedtime      linaclotide (LINZESS) 290 MCG capsule Take 290 mcg by mouth every morning (before breakfast)      lisinopril (ZESTRIL) 5 MG tablet Take 5 mg by mouth daily      lurasidone (LATUDA) 80 MG TABS tablet Take 80 mg by mouth daily (with dinner) Take with full meal of at least 350 calories      melatonin 5 MG tablet Take 5 mg by mouth at bedtime      metFORMIN (GLUCOPHAGE) 500 MG tablet Take 500 mg by mouth 2 times daily (with meals)      Multiple Vitamins-Minerals (CERTA-JUAN-LUTEIN PO) Take 1 tablet by mouth daily      nicotine (COMMIT) 2 MG lozenge Place 2 mg inside cheek every hour as needed for nicotine withdrawal symptoms Place 1 lozenge in between cheek and gum every 1 hour while awake as needed for nicotine craving      !! ondansetron (ZOFRAN ODT) 4 MG ODT tab  "Take 4 mg by mouth every 8 hours as needed for nausea      pantoprazole (PROTONIX) 40 MG EC tablet Take 40 mg by mouth 2 times daily (before meals)      senna-docusate (SENOKOT-S/PERICOLACE) 8.6-50 MG tablet Take 1 tablet by mouth 2 times daily as needed for constipation      sodium bicarbonate 650 MG tablet Take 1,300 mg by mouth 2 times daily      SUMAtriptan (IMITREX) 100 MG tablet Take 100 mg by mouth at onset of headache for migraine may repeat after 2 hours if needed;  mg/24 hours      tiZANidine (ZANAFLEX) 4 MG tablet Take 4 mg by mouth 3 times daily as needed for muscle spasms      topiramate (TOPAMAX) 100 MG tablet Take 100 mg by mouth 2 times daily       !! - Potential duplicate medications found. Please discuss with provider.        STOP taking these medications       albuterol (PROVENTIL HFA;VENTOLIN HFA) 90 mcg/actuation inhaler Comments:   Reason for Stopping:             Allergies   Allergies   Allergen Reactions    Haloperidol Other (See Comments)     Other reaction(s): Loss of Vision, Tongue Swelling  Per Pt report  \"My eyes would roll up\"      Lithium Hives, Other (See Comments), Rash and Swelling    Olanzapine Rash     Increased blood glucose  Other reaction(s): Throat Swelling/Closing    Quetiapine Hives and Swelling     Other reaction(s): Angioedema, Throat Swelling/Closing    Amphetamine-Dextroamphetamine Headache and Other (See Comments)     Felt like face was swollen  Felt like face was swollen  Felt like face was swollen      Iodinated Contrast Media Other (See Comments) and Swelling     Pt arm swollen from contrast    Trazodone Itching    Peanut [Peanut Oil] Unknown    Risperdal [Risperidone] Unknown    Valproic Acid      Other reaction(s): Headache     "

## 2024-07-05 NOTE — PLAN OF CARE
PRIMARY DIAGNOSIS: METABOLIC ACIDOSIS   OUTPATIENT/OBSERVATION GOALS TO BE MET BEFORE DISCHARGE:  1. Pain Status: Pain free.    2. Return to near baseline physical activity: Yes    3. Cleared for discharge by consultants (if involved): N/A    Discharge Planner Nurse   Safe discharge environment identified: Yes  Barriers to discharge: No       Entered by: LAURA DEMPSEY RN 07/05/2024    Vital signs:  Temp: 98.1  F (36.7  C) Temp src: Oral BP: 118/61 Pulse: 80   Resp: 16 SpO2: 97 % O2 Device: None (Room air)     Alert and oriented x4. Denies pain, abdominal discomfort, nausea, vomiting. Tolerated regular diet well. Last BM yesterday. BG after breakfast 271. IVF NS @75mL/hr. Eager to leave this morning saying he has work at 1100. Provider aware. Plan discharge this morning. Pt will call  staff member for ride. Independent. Call light in reach    Please review provider order for any additional goals.   Nurse to notify provider when observation goals have been met and patient is ready for discharge.Goal Outcome Evaluation:      Plan of Care Reviewed With: patient    Overall Patient Progress: improvingOverall Patient Progress: improving    Outcome Evaluation: denies abdominal symptoms/pain

## 2024-07-05 NOTE — CONSULTS
Care Management Discharge Note    Discharge Date: 07/05/2024     Discharge Disposition:  [facility]    Discharge Services: None anticipated     Discharge Transportation:  staff    Private pay costs discussed: Not applicable    Does the patient's insurance plan have a 3 day qualifying hospital stay waiver?  No    Patient/Family in Agreement with the Plan: Yes     Handoff Referral Completed: No    Additional Information:  SW consulted for discharge planning. Patient was admitted from [facility] in [place]. Patient reports being fairly independent at home, receiving assistance with med administration, housekeeping, meals and transportation. He reports that he has talked to  staff Faisi ([phone]) who will be transportation patient home from the hospital this morning. Called number listed for Faisi but phone kept ringing with no answer and no option to leave a VM. Pt provided additional contact info for , [phone]. Was able to reach  staff at this number and they confirmed being able to accept pt back to  today. They do not need discharge orders faxed, packet can be sent with patient. SW will remain available for any further needs.     EDGARDO Angeles, [facility]   Inpatient Care Coordination  [facility]   [phone]

## 2024-08-02 ENCOUNTER — HOSPITAL ENCOUNTER (EMERGENCY)
Facility: CLINIC | Age: 29
Discharge: HOME | End: 2024-08-02
Attending: EMERGENCY MEDICINE | Admitting: EMERGENCY MEDICINE
Payer: MEDICARE

## 2024-08-02 VITALS
BODY MASS INDEX: 34.78 KG/M2 | HEART RATE: 93 BPM | HEIGHT: 69 IN | TEMPERATURE: 97.9 F | RESPIRATION RATE: 16 BRPM | OXYGEN SATURATION: 100 % | SYSTOLIC BLOOD PRESSURE: 121 MMHG | DIASTOLIC BLOOD PRESSURE: 83 MMHG | WEIGHT: 234.79 LBS

## 2024-08-02 DIAGNOSIS — R11.2 NAUSEA AND VOMITING, UNSPECIFIED VOMITING TYPE: ICD-10-CM

## 2024-08-02 DIAGNOSIS — R53.1 GENERAL WEAKNESS: ICD-10-CM

## 2024-08-02 LAB
ALBUMIN SERPL BCG-MCNC: 4.4 G/DL (ref 3.5–5.2)
ALBUMIN UR-MCNC: 10 MG/DL
ALP SERPL-CCNC: 129 U/L (ref 40–150)
ALT SERPL W P-5'-P-CCNC: 28 U/L (ref 0–70)
ANION GAP SERPL CALCULATED.3IONS-SCNC: 14 MMOL/L (ref 7–15)
APPEARANCE UR: CLEAR
AST SERPL W P-5'-P-CCNC: 44 U/L (ref 0–45)
BASE EXCESS BLDV CALC-SCNC: -4.4 MMOL/L (ref -3–3)
BASOPHILS # BLD AUTO: 0.1 10E3/UL (ref 0–0.2)
BASOPHILS NFR BLD AUTO: 1 %
BILIRUB SERPL-MCNC: 0.4 MG/DL
BILIRUB UR QL STRIP: NEGATIVE
BUN SERPL-MCNC: 11.3 MG/DL (ref 6–20)
CALCIUM SERPL-MCNC: 8.9 MG/DL (ref 8.8–10.4)
CHLORIDE SERPL-SCNC: 105 MMOL/L (ref 98–107)
CK SERPL-CCNC: 285 U/L (ref 39–308)
COLOR UR AUTO: ABNORMAL
CREAT SERPL-MCNC: 0.92 MG/DL (ref 0.67–1.17)
EGFRCR SERPLBLD CKD-EPI 2021: >90 ML/MIN/1.73M2
EOSINOPHIL # BLD AUTO: 0.3 10E3/UL (ref 0–0.7)
EOSINOPHIL NFR BLD AUTO: 5 %
ERYTHROCYTE [DISTWIDTH] IN BLOOD BY AUTOMATED COUNT: 14.4 % (ref 10–15)
FLUAV RNA SPEC QL NAA+PROBE: NEGATIVE
FLUBV RNA RESP QL NAA+PROBE: NEGATIVE
GLUCOSE SERPL-MCNC: 318 MG/DL (ref 70–99)
GLUCOSE UR STRIP-MCNC: >=1000 MG/DL
HCO3 BLDV-SCNC: 21 MMOL/L (ref 21–28)
HCO3 SERPL-SCNC: 18 MMOL/L (ref 22–29)
HCT VFR BLD AUTO: 36.7 % (ref 40–53)
HGB BLD-MCNC: 12 G/DL (ref 13.3–17.7)
HGB UR QL STRIP: NEGATIVE
IMM GRANULOCYTES # BLD: 0 10E3/UL
IMM GRANULOCYTES NFR BLD: 0 %
KETONES UR STRIP-MCNC: NEGATIVE MG/DL
LEUKOCYTE ESTERASE UR QL STRIP: NEGATIVE
LIPASE SERPL-CCNC: 54 U/L (ref 13–60)
LYMPHOCYTES # BLD AUTO: 2.1 10E3/UL (ref 0.8–5.3)
LYMPHOCYTES NFR BLD AUTO: 35 %
MCH RBC QN AUTO: 29.3 PG (ref 26.5–33)
MCHC RBC AUTO-ENTMCNC: 32.7 G/DL (ref 31.5–36.5)
MCV RBC AUTO: 90 FL (ref 78–100)
MONOCYTES # BLD AUTO: 0.3 10E3/UL (ref 0–1.3)
MONOCYTES NFR BLD AUTO: 6 %
MUCOUS THREADS #/AREA URNS LPF: PRESENT /LPF
NEUTROPHILS # BLD AUTO: 3.3 10E3/UL (ref 1.6–8.3)
NEUTROPHILS NFR BLD AUTO: 54 %
NITRATE UR QL: NEGATIVE
NRBC # BLD AUTO: 0 10E3/UL
NRBC BLD AUTO-RTO: 0 /100
O2/TOTAL GAS SETTING VFR VENT: 0 %
OXYHGB MFR BLDV: 88 % (ref 70–75)
PCO2 BLDV: 38 MM HG (ref 40–50)
PH BLDV: 7.35 [PH] (ref 7.32–7.43)
PH UR STRIP: 7 [PH] (ref 5–7)
PLATELET # BLD AUTO: 281 10E3/UL (ref 150–450)
PO2 BLDV: 62 MM HG (ref 25–47)
POTASSIUM SERPL-SCNC: 4.3 MMOL/L (ref 3.4–5.3)
PROT SERPL-MCNC: 7.7 G/DL (ref 6.4–8.3)
RBC # BLD AUTO: 4.09 10E6/UL (ref 4.4–5.9)
RBC URINE: <1 /HPF
RSV RNA SPEC NAA+PROBE: NEGATIVE
SAO2 % BLDV: 91.9 % (ref 70–75)
SARS-COV-2 RNA RESP QL NAA+PROBE: NEGATIVE
SODIUM SERPL-SCNC: 137 MMOL/L (ref 135–145)
SP GR UR STRIP: 1.03 (ref 1–1.03)
UROBILINOGEN UR STRIP-MCNC: 3 MG/DL
WBC # BLD AUTO: 6.1 10E3/UL (ref 4–11)
WBC URINE: <1 /HPF

## 2024-08-02 PROCEDURE — 93005 ELECTROCARDIOGRAM TRACING: CPT

## 2024-08-02 PROCEDURE — 83690 ASSAY OF LIPASE: CPT | Performed by: EMERGENCY MEDICINE

## 2024-08-02 PROCEDURE — 96374 THER/PROPH/DIAG INJ IV PUSH: CPT

## 2024-08-02 PROCEDURE — 82805 BLOOD GASES W/O2 SATURATION: CPT | Performed by: EMERGENCY MEDICINE

## 2024-08-02 PROCEDURE — 81001 URINALYSIS AUTO W/SCOPE: CPT | Performed by: EMERGENCY MEDICINE

## 2024-08-02 PROCEDURE — 250N000011 HC RX IP 250 OP 636: Performed by: EMERGENCY MEDICINE

## 2024-08-02 PROCEDURE — 36415 COLL VENOUS BLD VENIPUNCTURE: CPT | Performed by: EMERGENCY MEDICINE

## 2024-08-02 PROCEDURE — 82550 ASSAY OF CK (CPK): CPT | Performed by: EMERGENCY MEDICINE

## 2024-08-02 PROCEDURE — 87637 SARSCOV2&INF A&B&RSV AMP PRB: CPT | Performed by: EMERGENCY MEDICINE

## 2024-08-02 PROCEDURE — 99284 EMERGENCY DEPT VISIT MOD MDM: CPT | Mod: 25

## 2024-08-02 PROCEDURE — 96375 TX/PRO/DX INJ NEW DRUG ADDON: CPT

## 2024-08-02 PROCEDURE — 80053 COMPREHEN METABOLIC PANEL: CPT | Performed by: EMERGENCY MEDICINE

## 2024-08-02 PROCEDURE — 85025 COMPLETE CBC W/AUTO DIFF WBC: CPT | Performed by: EMERGENCY MEDICINE

## 2024-08-02 PROCEDURE — 258N000003 HC RX IP 258 OP 636: Performed by: EMERGENCY MEDICINE

## 2024-08-02 RX ORDER — KETOROLAC TROMETHAMINE 15 MG/ML
15 INJECTION, SOLUTION INTRAMUSCULAR; INTRAVENOUS ONCE
Status: COMPLETED | OUTPATIENT
Start: 2024-08-02 | End: 2024-08-02

## 2024-08-02 RX ORDER — ONDANSETRON 2 MG/ML
4 INJECTION INTRAMUSCULAR; INTRAVENOUS ONCE
Status: COMPLETED | OUTPATIENT
Start: 2024-08-02 | End: 2024-08-02

## 2024-08-02 RX ADMIN — KETOROLAC TROMETHAMINE 15 MG: 15 INJECTION, SOLUTION INTRAMUSCULAR; INTRAVENOUS at 21:48

## 2024-08-02 RX ADMIN — SODIUM CHLORIDE, POTASSIUM CHLORIDE, SODIUM LACTATE AND CALCIUM CHLORIDE 1000 ML: 600; 310; 30; 20 INJECTION, SOLUTION INTRAVENOUS at 21:49

## 2024-08-02 RX ADMIN — ONDANSETRON 4 MG: 2 INJECTION INTRAMUSCULAR; INTRAVENOUS at 21:44

## 2024-08-02 ASSESSMENT — COLUMBIA-SUICIDE SEVERITY RATING SCALE - C-SSRS
1. IN THE PAST MONTH, HAVE YOU WISHED YOU WERE DEAD OR WISHED YOU COULD GO TO SLEEP AND NOT WAKE UP?: NO
2. HAVE YOU ACTUALLY HAD ANY THOUGHTS OF KILLING YOURSELF IN THE PAST MONTH?: NO
6. HAVE YOU EVER DONE ANYTHING, STARTED TO DO ANYTHING, OR PREPARED TO DO ANYTHING TO END YOUR LIFE?: NO

## 2024-08-02 ASSESSMENT — ACTIVITIES OF DAILY LIVING (ADL)
ADLS_ACUITY_SCORE: 37
ADLS_ACUITY_SCORE: 37
ADLS_ACUITY_SCORE: 35

## 2024-08-03 NOTE — ED PROVIDER NOTES
"  Emergency Department Note      History of Present Illness     Chief Complaint   Generalized weakness and headache     HPI   Eric Fall is a 29 year old male history of type 2 diabetes mellitus, migraines, TBI, seizure disorder, schizophrenia here for evaluation of generalized weakness and headache. Patient reports generalized weakness, body aches, diaphoresis and headache that started around 1730. States that this headache feels similar to last time he was admitted here on 6/18. Patient has history of migraines. Denies fever, urinary or bowel symptoms. No known sick contacts.        Independent Historian   None    Review of External Notes   See ed course     Past Medical History     Medical History and Problem List   ADHD   CKD  Conversion disorder   Diabetes   Fetal alcohol syndrome   Explosive disorder   Migraine   Mental retardation   Obesity   PTSD  Schizoaffective disorder   TBI  Seizures   Vesicoureteric reflux   Chemical abuse  Acidosis   Bipolar affective disorder   GERD  Onychomycosis   Anal fissure   Asthma   Prediabetes   Pes planus   Psychosis   Mesenteric mass   Nocturnal enuresis   Hypertension     Medications   Catapres   Zofran   Fazaclo   Ddavp   Zestril   Latuda   Lipitor   Metformin   Protonix   Imitrex   Zanaflex   Topamax   Senna-docusate     Surgical History   Joint replacement   Removal hardware femur   Arthroplasty, both hips   Circumcision       Physical Exam     Patient Vitals for the past 24 hrs:   BP Temp Temp src Pulse Resp SpO2 Height Weight   08/02/24 2309 121/83 -- -- 93 16 100 % -- --   08/02/24 2039 (!) 150/82 97.9  F (36.6  C) Temporal 118 16 100 % 1.753 m (5' 9\") 106.5 kg (234 lb 12.6 oz)     Physical Exam    HENT:  mmm, no rhinorrhea  Eyes: periorbital tissues and sclera normal   Neck: supple, no abnormal swelling  Lungs:  CTAB,  no resp distress  CV: rrr, no m/r/g, ppi  Abd: soft, nontender, nondistended, no rebound/masses/guarding/hsm  Ext: no peripheral " edema  Skin: warm, dry, well perfused, no rashes/bruising/lesions on exposed skin  Neuro: alert, MAEE, no gross motor or sensory deficits, gait stable  Psych: Normal mood, normal affect      Diagnostics     Lab Results   Labs Ordered and Resulted from Time of ED Arrival to Time of ED Departure   COMPREHENSIVE METABOLIC PANEL - Abnormal       Result Value    Sodium 137      Potassium 4.3      Carbon Dioxide (CO2) 18 (*)     Anion Gap 14      Urea Nitrogen 11.3      Creatinine 0.92      GFR Estimate >90      Calcium 8.9      Chloride 105      Glucose 318 (*)     Alkaline Phosphatase 129      AST 44      ALT 28      Protein Total 7.7      Albumin 4.4      Bilirubin Total 0.4     BLOOD GAS VENOUS - Abnormal    pH Venous 7.35      pCO2 Venous 38 (*)     pO2 Venous 62 (*)     Bicarbonate Venous 21      Base Excess/Deficit Venous -4.4 (*)     FIO2 0      Oxyhemoglobin Venous 88 (*)     O2 Sat, Venous 91.9 (*)    ROUTINE UA WITH MICROSCOPIC - Abnormal    Color Urine Light Yellow      Appearance Urine Clear      Glucose Urine >=1000 (*)     Bilirubin Urine Negative      Ketones Urine Negative      Specific Gravity Urine 1.031      Blood Urine Negative      pH Urine 7.0      Protein Albumin Urine 10 (*)     Urobilinogen Urine 3.0 (*)     Nitrite Urine Negative      Leukocyte Esterase Urine Negative      Mucus Urine Present (*)     RBC Urine <1      WBC Urine <1     CBC WITH PLATELETS AND DIFFERENTIAL - Abnormal    WBC Count 6.1      RBC Count 4.09 (*)     Hemoglobin 12.0 (*)     Hematocrit 36.7 (*)     MCV 90      MCH 29.3      MCHC 32.7      RDW 14.4      Platelet Count 281      % Neutrophils 54      % Lymphocytes 35      % Monocytes 6      % Eosinophils 5      % Basophils 1      % Immature Granulocytes 0      NRBCs per 100 WBC 0      Absolute Neutrophils 3.3      Absolute Lymphocytes 2.1      Absolute Monocytes 0.3      Absolute Eosinophils 0.3      Absolute Basophils 0.1      Absolute Immature Granulocytes 0.0       Absolute NRBCs 0.0     LIPASE - Normal    Lipase 54     CK TOTAL - Normal         INFLUENZA A/B, RSV, & SARS-COV2 PCR - Normal    Influenza A PCR Negative      Influenza B PCR Negative      RSV PCR Negative      SARS CoV2 PCR Negative         Imaging   No orders to display       EKG   ECG taken at 2102, ECG read at 2107  Sinus tachycardia    Rate 108 bpm. DE interval 166 ms. QRS duration 88 ms. QT/QTc 322/431 ms. P-R-T axes 54 26 38.    Independent Interpretation       ED Course      Medications Administered   Medications   lactated ringers BOLUS 1,000 mL (0 mLs Intravenous Stopped 8/2/24 2214)   ketorolac (TORADOL) injection 15 mg (15 mg Intravenous $Given 8/2/24 2148)   ondansetron (ZOFRAN) injection 4 mg (4 mg Intravenous $Given 8/2/24 2144)       Procedures   Procedures     Discussion of Management       ED Course   ED Course as of 08/03/24 0326   Fri Aug 02, 2024   2046 I reviewed discharge summary from July 5 after admission for nausea vomiting and generalized weakness associated with a syncopal episode.   2052 I obtained history and examined the patient as noted above.        Additional Documentation      Medical Decision Making / Diagnosis     CMS Diagnoses:     [facility]   Eric Fall is a 29 year old male presenting with generalized weakness headache and nausea.  No falls or trauma.  No fever sore throat or cough.  Examination here is unremarkable.  No localizing neurologic signs or symptoms.  I do not think he needs advanced imaging of the head nor does he need a spinal tap.  His abdomen is benign.  He felt better with time and medications here in the emergency department.  His EKG and blood tests were unremarkable other than glucose urea.  No significant acidosis.  He was able to tolerate a p.o. challenge and was requesting discharge.  This certainly seems reasonable.  Discharged home and welcome return with any new or worsening symptoms.    Disposition   The patient was  discharged.     Diagnosis     ICD-10-CM    1. General weakness  R53.1       2. Nausea and vomiting, unspecified vomiting type  R11.2            Discharge Medications   Discharge Medication List as of 8/2/2024 11:10 PM            Scribe Disclosure:  I, Katlyn Estrella, am serving as a scribe at 9:10 PM on 8/2/2024 to document services personally performed by Abdirizak Galvan, * based on my observations and the provider's statements to me.        Abdirizak Galvan MD  08/03/24 0326

## 2024-08-03 NOTE — ED TRIAGE NOTES
"Gen weakness, headache started at 1730. Pt states \"it feels the same as it did last time I was here and they admitted me\".        "

## 2024-08-05 LAB
ATRIAL RATE - MUSE: 108 BPM
DIASTOLIC BLOOD PRESSURE - MUSE: NORMAL MMHG
INTERPRETATION ECG - MUSE: NORMAL
P AXIS - MUSE: 54 DEGREES
PR INTERVAL - MUSE: 166 MS
QRS DURATION - MUSE: 88 MS
QT - MUSE: 322 MS
QTC - MUSE: 431 MS
R AXIS - MUSE: 26 DEGREES
SYSTOLIC BLOOD PRESSURE - MUSE: NORMAL MMHG
T AXIS - MUSE: 38 DEGREES
VENTRICULAR RATE- MUSE: 108 BPM

## 2024-09-04 ENCOUNTER — APPOINTMENT (OUTPATIENT)
Dept: GENERAL RADIOLOGY | Facility: CLINIC | Age: 29
End: 2024-09-04
Attending: PHYSICIAN ASSISTANT
Payer: MEDICARE

## 2024-09-04 ENCOUNTER — APPOINTMENT (OUTPATIENT)
Dept: CT IMAGING | Facility: CLINIC | Age: 29
End: 2024-09-04
Attending: PHYSICIAN ASSISTANT
Payer: MEDICARE

## 2024-09-04 ENCOUNTER — HOSPITAL ENCOUNTER (EMERGENCY)
Facility: CLINIC | Age: 29
Discharge: HOME | End: 2024-09-04
Attending: PHYSICIAN ASSISTANT | Admitting: PHYSICIAN ASSISTANT
Payer: MEDICARE

## 2024-09-04 VITALS
DIASTOLIC BLOOD PRESSURE: 97 MMHG | BODY MASS INDEX: 36.92 KG/M2 | HEART RATE: 120 BPM | HEIGHT: 67 IN | RESPIRATION RATE: 18 BRPM | OXYGEN SATURATION: 97 % | WEIGHT: 235.23 LBS | TEMPERATURE: 97.3 F | SYSTOLIC BLOOD PRESSURE: 121 MMHG

## 2024-09-04 DIAGNOSIS — R11.2 NAUSEA AND VOMITING: ICD-10-CM

## 2024-09-04 DIAGNOSIS — R10.9 ABDOMINAL PAIN: ICD-10-CM

## 2024-09-04 DIAGNOSIS — K59.00 CONSTIPATION: ICD-10-CM

## 2024-09-04 DIAGNOSIS — R05.9 COUGH: ICD-10-CM

## 2024-09-04 DIAGNOSIS — R06.00 DYSPNEA: ICD-10-CM

## 2024-09-04 DIAGNOSIS — R07.9 CHEST PAIN: ICD-10-CM

## 2024-09-04 LAB
ALBUMIN SERPL BCG-MCNC: 4.9 G/DL (ref 3.5–5.2)
ALBUMIN UR-MCNC: NEGATIVE MG/DL
ALP SERPL-CCNC: 133 U/L (ref 40–150)
ALT SERPL W P-5'-P-CCNC: 26 U/L (ref 0–70)
ANION GAP SERPL CALCULATED.3IONS-SCNC: 14 MMOL/L (ref 7–15)
APPEARANCE UR: CLEAR
AST SERPL W P-5'-P-CCNC: 22 U/L (ref 0–45)
BASOPHILS # BLD AUTO: 0.1 10E3/UL (ref 0–0.2)
BASOPHILS NFR BLD AUTO: 1 %
BILIRUB DIRECT SERPL-MCNC: <0.2 MG/DL (ref 0–0.3)
BILIRUB SERPL-MCNC: 0.5 MG/DL
BILIRUB UR QL STRIP: NEGATIVE
BUN SERPL-MCNC: 13.2 MG/DL (ref 6–20)
CALCIUM SERPL-MCNC: 9.7 MG/DL (ref 8.8–10.4)
CHLORIDE SERPL-SCNC: 99 MMOL/L (ref 98–107)
COLOR UR AUTO: NORMAL
CREAT SERPL-MCNC: 0.96 MG/DL (ref 0.67–1.17)
EGFRCR SERPLBLD CKD-EPI 2021: >90 ML/MIN/1.73M2
EOSINOPHIL # BLD AUTO: 0.3 10E3/UL (ref 0–0.7)
EOSINOPHIL NFR BLD AUTO: 5 %
ERYTHROCYTE [DISTWIDTH] IN BLOOD BY AUTOMATED COUNT: 13.2 % (ref 10–15)
GLUCOSE SERPL-MCNC: 221 MG/DL (ref 70–99)
GLUCOSE UR STRIP-MCNC: NEGATIVE MG/DL
HCO3 SERPL-SCNC: 20 MMOL/L (ref 22–29)
HCT VFR BLD AUTO: 39.7 % (ref 40–53)
HGB BLD-MCNC: 13.5 G/DL (ref 13.3–17.7)
HGB UR QL STRIP: NEGATIVE
HOLD SPECIMEN: NORMAL
IMM GRANULOCYTES # BLD: 0 10E3/UL
IMM GRANULOCYTES NFR BLD: 0 %
KETONES UR STRIP-MCNC: NEGATIVE MG/DL
LEUKOCYTE ESTERASE UR QL STRIP: NEGATIVE
LIPASE SERPL-CCNC: 52 U/L (ref 13–60)
LYMPHOCYTES # BLD AUTO: 2.1 10E3/UL (ref 0.8–5.3)
LYMPHOCYTES NFR BLD AUTO: 34 %
MCH RBC QN AUTO: 30.2 PG (ref 26.5–33)
MCHC RBC AUTO-ENTMCNC: 34 G/DL (ref 31.5–36.5)
MCV RBC AUTO: 89 FL (ref 78–100)
MONOCYTES # BLD AUTO: 0.4 10E3/UL (ref 0–1.3)
MONOCYTES NFR BLD AUTO: 6 %
NEUTROPHILS # BLD AUTO: 3.4 10E3/UL (ref 1.6–8.3)
NEUTROPHILS NFR BLD AUTO: 54 %
NITRATE UR QL: NEGATIVE
NRBC # BLD AUTO: 0 10E3/UL
NRBC BLD AUTO-RTO: 0 /100
PH UR STRIP: 7 [PH] (ref 5–7)
PLATELET # BLD AUTO: 317 10E3/UL (ref 150–450)
POTASSIUM SERPL-SCNC: 3.9 MMOL/L (ref 3.4–5.3)
PROT SERPL-MCNC: 8 G/DL (ref 6.4–8.3)
RBC # BLD AUTO: 4.47 10E6/UL (ref 4.4–5.9)
RBC URINE: 0 /HPF
SODIUM SERPL-SCNC: 133 MMOL/L (ref 135–145)
SP GR UR STRIP: 1.01 (ref 1–1.03)
TROPONIN T SERPL HS-MCNC: 11 NG/L
UROBILINOGEN UR STRIP-MCNC: NORMAL MG/DL
WBC # BLD AUTO: 6.3 10E3/UL (ref 4–11)
WBC URINE: <1 /HPF

## 2024-09-04 PROCEDURE — 85041 AUTOMATED RBC COUNT: CPT | Performed by: PHYSICIAN ASSISTANT

## 2024-09-04 PROCEDURE — 83690 ASSAY OF LIPASE: CPT | Performed by: PHYSICIAN ASSISTANT

## 2024-09-04 PROCEDURE — 93005 ELECTROCARDIOGRAM TRACING: CPT

## 2024-09-04 PROCEDURE — 74176 CT ABD & PELVIS W/O CONTRAST: CPT | Mod: MA

## 2024-09-04 PROCEDURE — 99285 EMERGENCY DEPT VISIT HI MDM: CPT | Mod: 25

## 2024-09-04 PROCEDURE — 36415 COLL VENOUS BLD VENIPUNCTURE: CPT | Performed by: PHYSICIAN ASSISTANT

## 2024-09-04 PROCEDURE — 81001 URINALYSIS AUTO W/SCOPE: CPT | Performed by: PHYSICIAN ASSISTANT

## 2024-09-04 PROCEDURE — 82248 BILIRUBIN DIRECT: CPT | Performed by: PHYSICIAN ASSISTANT

## 2024-09-04 PROCEDURE — 80053 COMPREHEN METABOLIC PANEL: CPT | Performed by: PHYSICIAN ASSISTANT

## 2024-09-04 PROCEDURE — 80048 BASIC METABOLIC PNL TOTAL CA: CPT | Performed by: PHYSICIAN ASSISTANT

## 2024-09-04 PROCEDURE — 84484 ASSAY OF TROPONIN QUANT: CPT | Performed by: PHYSICIAN ASSISTANT

## 2024-09-04 PROCEDURE — 71046 X-RAY EXAM CHEST 2 VIEWS: CPT

## 2024-09-04 ASSESSMENT — ACTIVITIES OF DAILY LIVING (ADL)
ADLS_ACUITY_SCORE: 37
ADLS_ACUITY_SCORE: 37

## 2024-09-04 ASSESSMENT — COLUMBIA-SUICIDE SEVERITY RATING SCALE - C-SSRS
2. HAVE YOU ACTUALLY HAD ANY THOUGHTS OF KILLING YOURSELF IN THE PAST MONTH?: NO
1. IN THE PAST MONTH, HAVE YOU WISHED YOU WERE DEAD OR WISHED YOU COULD GO TO SLEEP AND NOT WAKE UP?: NO
6. HAVE YOU EVER DONE ANYTHING, STARTED TO DO ANYTHING, OR PREPARED TO DO ANYTHING TO END YOUR LIFE?: NO

## 2024-09-04 NOTE — DISCHARGE INSTRUCTIONS
Continue with current medications for constipation. You need to contact GI again to discuss ongoing constipation and need for possible medication changes. Push fluids.

## 2024-09-04 NOTE — ED TRIAGE NOTES
Pt presents to the ED with complaint of generalized abdominal pain, constipation, and vomiting. Pt states he hasn't had a normal bowel movement in 4 weeks. Pt states the abd pain and vomiting has been going on for a month. Pt was in contact with [facility] and they instructed pt to come here. Pt states he's on multiple meds to promote bowel movement but they aren't working. Pain 9/10.

## 2024-09-04 NOTE — ED PROVIDER NOTES
Emergency Department Note      History of Present Illness     Chief Complaint   Constipation and Abdominal Pain      HPI   Eric Fall is a 29 year old male with a history of schizoaffective, conversion disorder, s/p distant abdominal surgery to remove mass from stomach per patient, who presents to the ED for evaluation of abdominal pain, chest pain, and constipation.  Patient endorses diffuse abdominal pain and bloating that is been present over the past month.  He states he has had intermittent nausea and vomiting as well, and states he has not had a normal bowel movement within the past month either, saying he is always constipated.  No diarrhea.  No black or blood in stool.  No fevers or chills.  He does endorse chest pain and dyspnea  that has been constant over the past month  As well as a cough.  He endorses urinary frequency, however no dysuria or hematuria.  Patient states he has seen [facility] with respect to this without clear etiology of symptoms.     Independent Historian   None    Review of External Notes   None    Past Medical History     Medical History and Problem List   Past Medical History:   Diagnosis Date    ADHD (attention deficit hyperactivity disorder)     Adult antisocial behavior     Chemical abuse     CKD (chronic kidney disease)     Conversion disorder 2011    Diabetes mellitus     Fetal alcohol syndrome     Hip dysplasia, congenital     Hypertension     Intermittent explosive disorder     Migraine     MR (mental retardation), moderate     Obesity     PTSD (post-traumatic stress disorder)     Schizoaffective disorder, bipolar type     Seizures 11/2021    TBI (traumatic brain injury) 2009    VUR (vesicoureteric reflux)        Medications   acetaminophen (TYLENOL) 500 MG tablet  atorvastatin (LIPITOR) 10 MG tablet  atropine 1 % ophthalmic solution  cloNIDine (CATAPRES) 0.1 MG tablet  cloZAPine (FAZACLO) 100 MG ODT  desmopressin (DDAVP) 0.2 MG tablet  linaclotide (LINZESS) 290 MCG  "capsule  lisinopril (ZESTRIL) 5 MG tablet  lurasidone (LATUDA) 80 MG TABS tablet  melatonin 5 MG tablet  metFORMIN (GLUCOPHAGE) 500 MG tablet  Multiple Vitamins-Minerals (CERTA-JUAN-LUTEIN PO)  nicotine (COMMIT) 2 MG lozenge  ondansetron (ZOFRAN ODT) 4 MG ODT tab  ondansetron (ZOFRAN ODT) 4 MG ODT tab  pantoprazole (PROTONIX) 40 MG EC tablet  senna-docusate (SENOKOT-S/PERICOLACE) 8.6-50 MG tablet  sodium bicarbonate 650 MG tablet  SUMAtriptan (IMITREX) 100 MG tablet  tiZANidine (ZANAFLEX) 4 MG tablet  topiramate (TOPAMAX) 100 MG tablet        Surgical History   Past Surgical History:   Procedure Laterality Date    HIP SURGERY  03/2015    Hip surgery with pin placement for SCFE  2010       Physical Exam     Patient Vitals for the past 24 hrs:   BP Temp Temp src Pulse Resp SpO2 Height Weight   09/04/24 1425 (!) 121/97 97.3  F (36.3  C) Temporal 120 18 97 % 1.702 m (5' 7\") 106.7 kg (235 lb 3.7 oz)     Physical Exam  Constitutional: Pleasant. Cooperative.   Eyes: Pupils equally round and reactive  HENT: Head is normal in appearance. Oropharynx is normal with moist mucus membranes.  Cardiovascular: Tachycardic. Regular rhythm, without murmurs.  Respiratory: Normal respiratory effort, lungs are clear bilaterally.  GI: Abdomen distended. Diffuse TTP.   Musculoskeletal: No asymmetry of the lower extremities.  Skin: Surgical scar noted to upper part of abdomen  Neurologic: Cranial nerves grossly intact, normal cognition, no focal deficits. Alert and oriented x 3.   Psychiatric: Normal affect.  Nursing notes and vital signs reviewed.      Diagnostics     Lab Results   Labs Ordered and Resulted from Time of ED Arrival to Time of ED Departure   BASIC METABOLIC PANEL - Abnormal       Result Value    Sodium 133 (*)     Potassium 3.9      Chloride 99      Carbon Dioxide (CO2) 20 (*)     Anion Gap 14      Urea Nitrogen 13.2      Creatinine 0.96      GFR Estimate >90      Calcium 9.7      Glucose 221 (*)    CBC WITH PLATELETS AND " DIFFERENTIAL - Abnormal    WBC Count 6.3      RBC Count 4.47      Hemoglobin 13.5      Hematocrit 39.7 (*)     MCV 89      MCH 30.2      MCHC 34.0      RDW 13.2      Platelet Count 317      % Neutrophils 54      % Lymphocytes 34      % Monocytes 6      % Eosinophils 5      % Basophils 1      % Immature Granulocytes 0      NRBCs per 100 WBC 0      Absolute Neutrophils 3.4      Absolute Lymphocytes 2.1      Absolute Monocytes 0.4      Absolute Eosinophils 0.3      Absolute Basophils 0.1      Absolute Immature Granulocytes 0.0      Absolute NRBCs 0.0     HEPATIC FUNCTION PANEL - Normal    Protein Total 8.0      Albumin 4.9      Bilirubin Total 0.5      Alkaline Phosphatase 133      AST 22      ALT 26      Bilirubin Direct <0.20     LIPASE - Normal    Lipase 52     TROPONIN T, HIGH SENSITIVITY - Normal    Troponin T, High Sensitivity 11     ROUTINE UA WITH MICROSCOPIC REFLEX TO CULTURE       Imaging   Chest XR,  PA & LAT   Final Result   IMPRESSION: No focal consolidation, pleural effusion or pneumothorax.   Cardiomediastinal silhouette is unremarkable.      CYNTHIA CANO MD            SYSTEM ID:  [id number]      CT Abdomen Pelvis w/o Contrast   Final Result   IMPRESSION:    1.  Findings suggestive of constipation without additional visualized   explanation for patient's symptoms.      MORRIS DUMONT MD            SYSTEM ID:  [id number]        EKG   ECG results from 09/04/24   EKG 12 lead     Value    Systolic Blood Pressure     Diastolic Blood Pressure     Ventricular Rate 111    Atrial Rate 111    IA Interval 164    QRS Duration 76        QTc 432    P Axis 54    R AXIS 17    T Axis 27    Interpretation ECG      Sinus tachycardia  Otherwise normal ECG  When compared with ECG of 02-Aug-2024 21:02,  No significant change was found       Independent Interpretation   None    ED Course      Medications Administered   Medications - No data to display    Procedures   Procedures     Discussion of Management    None    ED Course        Additional Documentation  None    Medical Decision Making / Diagnosis     CMS Diagnoses: None    MIPS       None    MDM   Eric Fall is a 29 year old male with a history of schizoaffective, conversion disorder, who presents to the ED for evaluation of abdominal pain, chest pain, constipation, amongst others. Most symptoms have been present for at least 1-2 months. See HPI as above for additional details. Vitals and physical exam as above. DDx was broad and included ACS, dissection, PE, PTX, arrhythmia, PNA, pancreatitis, hepatitis, biliary pathology, constipation, kidney stone, pyelo, UTI, SBO, perforated viscus, amongst others. Work up obtained as above. Given long duration of symptoms, no tachypnea or hypoxemia, lower suspicion for PE at this time. Patient is tachycardic, however patient tachycardic most of the time in the ED on chart review. ECG reassuring. CXR negative for acute intrathoracic abnormality. Remainder of work up as above notable for constipation. Patient is currently on multiple different therapies for constipation. At this time, without evidence for obstruction or other surgical process, do feel that patient is appropriate for discharge to home for further cares with respect to his constipation via GI. Patient in agreement with this plan. All questions answered. Patient discharged in stable condition.    Disposition   The patient was discharged.     Diagnosis     ICD-10-CM    1. Abdominal pain  R10.9       2. Constipation  K59.00       3. Nausea and vomiting  R11.2       4. Chest pain  R07.9       5. Dyspnea  R06.00       6. Cough  R05.9            Discharge Medications   New Prescriptions    No medications on file       This record was created at least in part using electronic voice recognition software, so please excuse any typographical errors.         Stephan Mock PA-C  09/04/24 6309

## 2024-09-05 LAB
ATRIAL RATE - MUSE: 111 BPM
DIASTOLIC BLOOD PRESSURE - MUSE: NORMAL MMHG
INTERPRETATION ECG - MUSE: NORMAL
P AXIS - MUSE: 54 DEGREES
PR INTERVAL - MUSE: 164 MS
QRS DURATION - MUSE: 76 MS
QT - MUSE: 318 MS
QTC - MUSE: 432 MS
R AXIS - MUSE: 17 DEGREES
SYSTOLIC BLOOD PRESSURE - MUSE: NORMAL MMHG
T AXIS - MUSE: 27 DEGREES
VENTRICULAR RATE- MUSE: 111 BPM

## 2024-09-25 ENCOUNTER — HOSPITAL ENCOUNTER (EMERGENCY)
Facility: CLINIC | Age: 29
Discharge: HOME | End: 2024-09-26
Attending: EMERGENCY MEDICINE | Admitting: EMERGENCY MEDICINE
Payer: MEDICARE

## 2024-09-25 ENCOUNTER — APPOINTMENT (OUTPATIENT)
Dept: GENERAL RADIOLOGY | Facility: CLINIC | Age: 29
End: 2024-09-25
Attending: EMERGENCY MEDICINE
Payer: MEDICARE

## 2024-09-25 VITALS
DIASTOLIC BLOOD PRESSURE: 73 MMHG | WEIGHT: 228 LBS | OXYGEN SATURATION: 98 % | HEIGHT: 72 IN | HEART RATE: 106 BPM | TEMPERATURE: 98.1 F | BODY MASS INDEX: 30.88 KG/M2 | RESPIRATION RATE: 24 BRPM | SYSTOLIC BLOOD PRESSURE: 120 MMHG

## 2024-09-25 DIAGNOSIS — M25.551 RIGHT HIP PAIN: ICD-10-CM

## 2024-09-25 PROCEDURE — 250N000011 HC RX IP 250 OP 636: Performed by: EMERGENCY MEDICINE

## 2024-09-25 PROCEDURE — 250N000013 HC RX MED GY IP 250 OP 250 PS 637: Performed by: EMERGENCY MEDICINE

## 2024-09-25 PROCEDURE — 96375 TX/PRO/DX INJ NEW DRUG ADDON: CPT

## 2024-09-25 PROCEDURE — 96374 THER/PROPH/DIAG INJ IV PUSH: CPT

## 2024-09-25 PROCEDURE — 99284 EMERGENCY DEPT VISIT MOD MDM: CPT | Mod: 25

## 2024-09-25 PROCEDURE — 73502 X-RAY EXAM HIP UNI 2-3 VIEWS: CPT

## 2024-09-25 RX ORDER — HYDROMORPHONE HYDROCHLORIDE 1 MG/ML
0.5 INJECTION, SOLUTION INTRAMUSCULAR; INTRAVENOUS; SUBCUTANEOUS ONCE
Status: COMPLETED | OUTPATIENT
Start: 2024-09-25 | End: 2024-09-25

## 2024-09-25 RX ORDER — METHOCARBAMOL 750 MG/1
750 TABLET, FILM COATED ORAL ONCE
Status: COMPLETED | OUTPATIENT
Start: 2024-09-25 | End: 2024-09-25

## 2024-09-25 RX ORDER — HYDROMORPHONE HYDROCHLORIDE 1 MG/ML
0.3 INJECTION, SOLUTION INTRAMUSCULAR; INTRAVENOUS; SUBCUTANEOUS
Status: DISCONTINUED | OUTPATIENT
Start: 2024-09-25 | End: 2024-09-26 | Stop reason: HOSPADM

## 2024-09-25 RX ORDER — KETOROLAC TROMETHAMINE 15 MG/ML
15 INJECTION, SOLUTION INTRAMUSCULAR; INTRAVENOUS ONCE
Status: COMPLETED | OUTPATIENT
Start: 2024-09-25 | End: 2024-09-25

## 2024-09-25 RX ADMIN — METHOCARBAMOL 750 MG: 750 TABLET ORAL at 22:39

## 2024-09-25 RX ADMIN — HYDROMORPHONE HYDROCHLORIDE 0.5 MG: 1 INJECTION, SOLUTION INTRAMUSCULAR; INTRAVENOUS; SUBCUTANEOUS at 21:26

## 2024-09-25 RX ADMIN — KETOROLAC TROMETHAMINE 15 MG: 15 INJECTION, SOLUTION INTRAMUSCULAR; INTRAVENOUS at 22:39

## 2024-09-25 ASSESSMENT — ACTIVITIES OF DAILY LIVING (ADL)
ADLS_ACUITY_SCORE: 37
ADLS_ACUITY_SCORE: 37

## 2024-09-26 NOTE — ED TRIAGE NOTES
Pt here with c/o R hip pain. Had a hip replacement 3/24. Lives in a  and he states that when he was getting up he heard a pop and developed pain. States he has been unable to bear weight on that leg.      Triage Assessment (Adult)       Row Name 09/25/24 8372          Triage Assessment    Airway WDL WDL        Respiratory WDL    Respiratory WDL WDL        Skin Circulation/Temperature WDL    Skin Circulation/Temperature WDL WDL        Cardiac WDL    Cardiac WDL WDL        Peripheral/Neurovascular WDL    Peripheral Neurovascular WDL WDL        Cognitive/Neuro/Behavioral WDL    Cognitive/Neuro/Behavioral WDL WDL

## 2024-09-26 NOTE — ED PROVIDER NOTES
Emergency Department Note      History of Present Illness     Chief Complaint   Hip Pain    HPI   Eric Fall is a 29 year old male with a history of CKD stage 3, diabetes type 2, hypertension, and TBI who presents to the ER for 7/10 right hip pain. Patient reports trying to stand up today when his right hip felt a pop. He states that he has been unable to walk since and did not fall. He endorses pain on the proximal lateral right leg.  He has no pain to the left side.  Eric denies pain in the lower legs, abdominal pain, or fever. He has a history of hip replacement on march 24th and a history of abdominal surgery to remove a tumor.     Independent Historian   None    Review of External Notes   Patient seen previously in the ER for constipation abdominal pain earlier this month on September 4    Past Medical History     Medical History and Problem List   ADHD   CKD stage 3   Conversion disorder  Diabetes mellitus, type 2  Fetal alcohol syndrome  Hip dysplasia  Hypertension  Intermittent explosive disorder  Migraine  MR   PTSD   Schizoaffective disorder, bipolar type  Seizures  TBI  VUR   Asthma   Chronic tachycardia   Onychomycosis   OA  GERD     Medications   Atorvastatin   Clonidine   Clonazepam   Desmopressin   Lisinopril   Lurasidone   Melatonin  Metformin   Ondansetron   Pantoprazole  Senna-docusate  Sumatriptan  Tizanidine   Topiramate     Surgical History   Arthroplasty hip, bilateral   Circumcision   Physical Exam     Patient Vitals for the past 24 hrs:   BP Temp Temp src Pulse Resp SpO2 Height Weight   09/25/24 2343 -- -- -- -- -- 98 % -- --   09/25/24 2338 120/73 -- -- 106 -- -- -- --   09/25/24 2146 -- -- -- -- -- -- 1.829 m (6') --   09/25/24 2143 -- -- -- -- -- 95 % -- --   09/25/24 2128 -- -- -- -- -- 96 % -- --   09/25/24 2116 (!) 146/76 -- -- 112 -- 96 % -- --   09/25/24 2110 (!) 141/89 98.1  F (36.7  C) Oral 118 24 97 % -- 103.4 kg (228 lb)     Physical Exam  Vitals reviewed.    Constitutional:       General: He is not in acute distress.     Appearance: He is not ill-appearing.   HENT:      Head: Normocephalic and atraumatic.   Eyes:      Extraocular Movements: Extraocular movements intact.   Cardiovascular:      Rate and Rhythm: Normal rate and regular rhythm.   Pulmonary:      Effort: Pulmonary effort is normal. No respiratory distress.      Breath sounds: Normal breath sounds. No wheezing.   Abdominal:      Palpations: Abdomen is soft.      Tenderness: There is no abdominal tenderness. There is no guarding.   Musculoskeletal:      Cervical back: Normal range of motion.      Comments: Right hip: No obvious bony deformity or tenderness.  Patient able to actively range of motion the right hip with hip flexion.  Distal pulses intact.      Pelvis is stable.  No tenderness to the left hip   Skin:     General: Skin is warm and dry.   Neurological:      Mental Status: He is alert and oriented to person, place, and time.      GCS: GCS eye subscore is 4. GCS verbal subscore is 5. GCS motor subscore is 6.   Psychiatric:         Behavior: Behavior normal.           Diagnostics     Lab Results   Labs Ordered and Resulted from Time of ED Arrival to Time of ED Departure - No data to display    Imaging   XR Pelvis w Hip Right 1 View   Final Result   IMPRESSION: Bones are well-mineralized. Bipolar total hip prostheses are present there is no evidence of hardware loosening or failure. The right hip prosthesis is well aligned.        Independent Interpretation   X-ray Right hip shows no acute fractures or dislocation    ED Course      Medications Administered   Medications   HYDROmorphone (PF) (DILAUDID) injection 0.3 mg (has no administration in time range)   HYDROmorphone (PF) (DILAUDID) injection 0.5 mg (0.5 mg Intravenous $Given 9/25/24 2126)   methocarbamol (ROBAXIN) tablet 750 mg (750 mg Oral $Given 9/25/24 2239)   ketorolac (TORADOL) injection 15 mg (15 mg Intravenous $Given 9/25/24 2239)        Procedures   Procedures     Discussion of Management   None    ED Course   ED Course as of 09/26/24 0007   Wed Sep 25, 2024   2204 Reviewed x-ray no acute fracture or dislocation identified   2228 I rechecked patient and explained findings and plan of care.     2334 I rechecked patient and explained findings and plan of care.         Additional Documentation  Stress/Adjustment Disorders    Medical Decision Making / Diagnosis     CMS Diagnoses: None    MIPS       None    MDM   Eric Fall is a 29 year old male presents today with right hip pain.  He history of hip replacement previously.  States that he was standing up and felt a pop.  He has no obvious bony deformity or tenderness noted.  He was given Dilaudid prior to my assessment.  He is able to actively range of motion of the hip on my exam.  X-ray showed no acute fracture or dislocation.  Patient was updated on results.  He was given Robaxin and Toradol.  He was able to ambulate without any difficulty.  I discussed plan for discharge home.  Instructed him to follow-up with his PCP and Ortho.  Discussed care instructions and return precautions.  Patient stable for discharge at this time.    Disposition   The patient was discharged.     Diagnosis     ICD-10-CM    1. Right hip pain  M25.551            Discharge Medications   New Prescriptions    No medications on file         Scribe Disclosure:  I, Dinesh Munoz, am serving as a scribe at 10:35 PM on 9/25/2024 to document services personally performed by Tia Rehman DO based on my observations and the provider's statements to me.        Tia Rehman DO  09/26/24 0020

## 2024-11-27 ENCOUNTER — HOSPITAL ENCOUNTER (EMERGENCY)
Facility: CLINIC | Age: 29
Discharge: HOME | End: 2024-11-28
Attending: PHYSICIAN ASSISTANT
Payer: MEDICARE

## 2024-11-27 ENCOUNTER — APPOINTMENT (OUTPATIENT)
Dept: GENERAL RADIOLOGY | Facility: CLINIC | Age: 29
End: 2024-11-27
Attending: PHYSICIAN ASSISTANT
Payer: MEDICARE

## 2024-11-27 DIAGNOSIS — R73.9 HYPERGLYCEMIA: ICD-10-CM

## 2024-11-27 DIAGNOSIS — J06.9 VIRAL URI WITH COUGH: ICD-10-CM

## 2024-11-27 LAB
ANION GAP SERPL CALCULATED.3IONS-SCNC: 15 MMOL/L (ref 7–15)
B-OH-BUTYR SERPL-SCNC: NORMAL MMOL/L
B-OH-BUTYR SERPL-SCNC: NORMAL MMOL/L
BASE EXCESS BLDV CALC-SCNC: -3.6 MMOL/L (ref -3–3)
BASOPHILS # BLD AUTO: 0.1 10E3/UL (ref 0–0.2)
BASOPHILS NFR BLD AUTO: 1 %
BUN SERPL-MCNC: 9.9 MG/DL (ref 6–20)
CALCIUM SERPL-MCNC: 8.6 MG/DL (ref 8.8–10.4)
CHLORIDE SERPL-SCNC: 94 MMOL/L (ref 98–107)
CREAT SERPL-MCNC: 0.84 MG/DL (ref 0.67–1.17)
EGFRCR SERPLBLD CKD-EPI 2021: >90 ML/MIN/1.73M2
EOSINOPHIL # BLD AUTO: 0.2 10E3/UL (ref 0–0.7)
EOSINOPHIL NFR BLD AUTO: 2 %
ERYTHROCYTE [DISTWIDTH] IN BLOOD BY AUTOMATED COUNT: 12.9 % (ref 10–15)
FLUAV RNA SPEC QL NAA+PROBE: NEGATIVE
FLUBV RNA RESP QL NAA+PROBE: NEGATIVE
GLUCOSE BLDC GLUCOMTR-MCNC: 422 MG/DL (ref 70–99)
GLUCOSE BLDC GLUCOMTR-MCNC: 437 MG/DL (ref 70–99)
GLUCOSE SERPL-MCNC: 492 MG/DL (ref 70–99)
HCO3 BLDV-SCNC: 22 MMOL/L (ref 21–28)
HCO3 SERPL-SCNC: 18 MMOL/L (ref 22–29)
HCT VFR BLD AUTO: 36.8 % (ref 40–53)
HGB BLD-MCNC: 13.8 G/DL (ref 13.3–17.7)
IMM GRANULOCYTES # BLD: 0 10E3/UL
IMM GRANULOCYTES NFR BLD: 0 %
LACTATE SERPL-SCNC: 2 MMOL/L (ref 0.7–2)
LYMPHOCYTES # BLD AUTO: 1.1 10E3/UL (ref 0.8–5.3)
LYMPHOCYTES NFR BLD AUTO: 15 %
MCH RBC QN AUTO: 33.7 PG (ref 26.5–33)
MCHC RBC AUTO-ENTMCNC: 37.5 G/DL (ref 31.5–36.5)
MCV RBC AUTO: 90 FL (ref 78–100)
MONOCYTES # BLD AUTO: 0.6 10E3/UL (ref 0–1.3)
MONOCYTES NFR BLD AUTO: 8 %
NEUTROPHILS # BLD AUTO: 5.4 10E3/UL (ref 1.6–8.3)
NEUTROPHILS NFR BLD AUTO: 74 %
NRBC # BLD AUTO: 0 10E3/UL
NRBC BLD AUTO-RTO: 0 /100
O2/TOTAL GAS SETTING VFR VENT: 21 %
OXYHGB MFR BLDV: 78 % (ref 70–75)
PCO2 BLDV: 41 MM HG (ref 40–50)
PH BLDV: 7.34 [PH] (ref 7.32–7.43)
PLATELET # BLD AUTO: 292 10E3/UL (ref 150–450)
PO2 BLDV: 46 MM HG (ref 25–47)
POTASSIUM SERPL-SCNC: 4.3 MMOL/L (ref 3.4–5.3)
RBC # BLD AUTO: 4.1 10E6/UL (ref 4.4–5.9)
RSV RNA SPEC NAA+PROBE: NEGATIVE
SAO2 % BLDV: 82.1 % (ref 70–75)
SARS-COV-2 RNA RESP QL NAA+PROBE: NEGATIVE
SODIUM SERPL-SCNC: 127 MMOL/L (ref 135–145)
WBC # BLD AUTO: 7.4 10E3/UL (ref 4–11)

## 2024-11-27 PROCEDURE — 82805 BLOOD GASES W/O2 SATURATION: CPT | Performed by: PHYSICIAN ASSISTANT

## 2024-11-27 PROCEDURE — 96361 HYDRATE IV INFUSION ADD-ON: CPT

## 2024-11-27 PROCEDURE — 87637 SARSCOV2&INF A&B&RSV AMP PRB: CPT | Performed by: PHYSICIAN ASSISTANT

## 2024-11-27 PROCEDURE — 82962 GLUCOSE BLOOD TEST: CPT

## 2024-11-27 PROCEDURE — 99284 EMERGENCY DEPT VISIT MOD MDM: CPT | Mod: 25

## 2024-11-27 PROCEDURE — 80048 BASIC METABOLIC PNL TOTAL CA: CPT | Performed by: PHYSICIAN ASSISTANT

## 2024-11-27 PROCEDURE — 36415 COLL VENOUS BLD VENIPUNCTURE: CPT | Performed by: PHYSICIAN ASSISTANT

## 2024-11-27 PROCEDURE — 83605 ASSAY OF LACTIC ACID: CPT | Performed by: PHYSICIAN ASSISTANT

## 2024-11-27 PROCEDURE — 85014 HEMATOCRIT: CPT | Performed by: PHYSICIAN ASSISTANT

## 2024-11-27 PROCEDURE — 250N000013 HC RX MED GY IP 250 OP 250 PS 637: Performed by: PHYSICIAN ASSISTANT

## 2024-11-27 PROCEDURE — 96360 HYDRATION IV INFUSION INIT: CPT

## 2024-11-27 PROCEDURE — 258N000003 HC RX IP 258 OP 636: Performed by: PHYSICIAN ASSISTANT

## 2024-11-27 PROCEDURE — 71046 X-RAY EXAM CHEST 2 VIEWS: CPT

## 2024-11-27 PROCEDURE — 82010 KETONE BODYS QUAN: CPT | Performed by: PHYSICIAN ASSISTANT

## 2024-11-27 PROCEDURE — 85004 AUTOMATED DIFF WBC COUNT: CPT | Performed by: PHYSICIAN ASSISTANT

## 2024-11-27 RX ORDER — ACETAMINOPHEN 325 MG/1
975 TABLET ORAL ONCE
Status: COMPLETED | OUTPATIENT
Start: 2024-11-27 | End: 2024-11-27

## 2024-11-27 RX ADMIN — ACETAMINOPHEN 325MG 975 MG: 325 TABLET ORAL at 19:25

## 2024-11-27 RX ADMIN — SODIUM CHLORIDE 1000 ML: 9 INJECTION, SOLUTION INTRAVENOUS at 22:45

## 2024-11-27 ASSESSMENT — ACTIVITIES OF DAILY LIVING (ADL)
ADLS_ACUITY_SCORE: 46

## 2024-11-28 VITALS
WEIGHT: 222.8 LBS | RESPIRATION RATE: 18 BRPM | TEMPERATURE: 98.1 F | SYSTOLIC BLOOD PRESSURE: 144 MMHG | BODY MASS INDEX: 34.97 KG/M2 | HEIGHT: 67 IN | OXYGEN SATURATION: 96 % | HEART RATE: 111 BPM | DIASTOLIC BLOOD PRESSURE: 98 MMHG

## 2024-11-28 LAB — B-OH-BUTYR SERPL-SCNC: 0.27 MMOL/L

## 2024-11-28 NOTE — ED NOTES
"Pt disclosed to writer that he is \"pre-diabetic\". Pt requested blood sugar check. Blod sugar noted to be 437. Stephan ordered lab work. Writer inserted PIV and did bloodwork. Pt then stated that he has been hospitalized for \"ketoacidosis\". MD aware.   "

## 2024-11-28 NOTE — DISCHARGE INSTRUCTIONS
Use Tylenol and ibuprofen for body aches.  Stay hydrated.  Take medications as prescribed.  Follow up with your primary doctor in the next 7-10 days for blood sugar recheck to see if your Metformin needs to be increased.

## 2024-11-28 NOTE — ED PROVIDER NOTES
Emergency Department Note      History of Present Illness     Chief Complaint   Cough      HPI   Eric Fall is a 29 year old male who presents to the ED for evaluation of a cough. Patient notes onset of subjective fever, headache, congestion, sore throat, cough, diffuse myalgias, generalized weakness that began 1-2 days ago. No abdominal pain. He did have an episode of emesis and diarrhea. No known ill contacts. He has not taken anything for his symptoms.    Independent Historian   None    Review of External Notes   None    Past Medical History     Medical History and Problem List   Past Medical History:   Diagnosis Date    ADHD (attention deficit hyperactivity disorder)     Adult antisocial behavior     Chemical abuse     CKD (chronic kidney disease)     Conversion disorder 2011    Diabetes mellitus     Fetal alcohol syndrome     Hip dysplasia, congenital     Hypertension     Intermittent explosive disorder     Migraine     MR (mental retardation), moderate     Obesity     PTSD (post-traumatic stress disorder)     Schizoaffective disorder, bipolar type     Seizures 11/2021    TBI (traumatic brain injury) 2009    VUR (vesicoureteric reflux)        Medications   acetaminophen (TYLENOL) 500 MG tablet  atorvastatin (LIPITOR) 10 MG tablet  atropine 1 % ophthalmic solution  cloNIDine (CATAPRES) 0.1 MG tablet  cloZAPine (FAZACLO) 100 MG ODT  desmopressin (DDAVP) 0.2 MG tablet  linaclotide (LINZESS) 290 MCG capsule  lisinopril (ZESTRIL) 5 MG tablet  lurasidone (LATUDA) 80 MG TABS tablet  melatonin 5 MG tablet  metFORMIN (GLUCOPHAGE) 500 MG tablet  Multiple Vitamins-Minerals (CERTA-JUAN-LUTEIN PO)  nicotine (COMMIT) 2 MG lozenge  ondansetron (ZOFRAN ODT) 4 MG ODT tab  ondansetron (ZOFRAN ODT) 4 MG ODT tab  pantoprazole (PROTONIX) 40 MG EC tablet  senna-docusate (SENOKOT-S/PERICOLACE) 8.6-50 MG tablet  sodium bicarbonate 650 MG tablet  SUMAtriptan (IMITREX) 100 MG tablet  tiZANidine (ZANAFLEX) 4 MG  "tablet  topiramate (TOPAMAX) 100 MG tablet        Surgical History   Past Surgical History:   Procedure Laterality Date    HIP SURGERY  03/2015    Hip surgery with pin placement for SCFE  2010       Physical Exam     Patient Vitals for the past 24 hrs:   BP Temp Pulse Resp SpO2 Height Weight   11/27/24 1846 (!) 141/72 98.1  F (36.7  C) 111 18 100 % 1.702 m (5' 7\") 101.1 kg (222 lb 12.8 oz)     Physical Exam  Constitutional: Pleasant. Cooperative.   Eyes: Pupils equally round and reactive  HENT: Head is normal in appearance. Moist mucous membranes. Mild oropharyngeal erythema. No exudates. No palatal asymmetry. Uvula midline. No trismus. No muffled voice. Tolerating their secretions.  Cardiovascular: Tachycardic. Regular rhythm.  Respiratory: Normal respiratory effort, lungs are clear bilaterally.  Neck: Normal ROM.   Skin: Normal, without rash.  Neurologic: Cranial nerves grossly intact. Alert.  Nursing notes and vital signs reviewed.      Diagnostics     Lab Results   Labs Ordered and Resulted from Time of ED Arrival to Time of ED Departure   GLUCOSE BY METER - Abnormal       Result Value    GLUCOSE BY METER POCT 437 (*)    BASIC METABOLIC PANEL - Abnormal    Sodium 127 (*)     Potassium 4.3      Chloride 94 (*)     Carbon Dioxide (CO2) 18 (*)     Anion Gap 15      Urea Nitrogen 9.9      Creatinine 0.84      GFR Estimate >90      Calcium 8.6 (*)     Glucose 492 (*)    BLOOD GAS VENOUS - Abnormal    pH Venous 7.34      pCO2 Venous 41      pO2 Venous 46      Bicarbonate Venous 22      Base Excess/Deficit Venous -3.6 (*)     FIO2 21      Oxyhemoglobin Venous 78 (*)     O2 Sat, Venous 82.1 (*)    CBC WITH PLATELETS AND DIFFERENTIAL - Abnormal    WBC Count 7.4      RBC Count 4.10 (*)     Hemoglobin 13.8      Hematocrit 36.8 (*)     MCV 90      MCH 33.7 (*)     MCHC 37.5 (*)     RDW 12.9      Platelet Count 292      % Neutrophils 74      % Lymphocytes 15      % Monocytes 8      % Eosinophils 2      % Basophils 1      % " Immature Granulocytes 0      NRBCs per 100 WBC 0      Absolute Neutrophils 5.4      Absolute Lymphocytes 1.1      Absolute Monocytes 0.6      Absolute Eosinophils 0.2      Absolute Basophils 0.1      Absolute Immature Granulocytes 0.0      Absolute NRBCs 0.0     GLUCOSE BY METER - Abnormal    GLUCOSE BY METER POCT 422 (*)    INFLUENZA A/B, RSV AND SARS-COV2 PCR - Normal    Influenza A PCR Negative      Influenza B PCR Negative      RSV PCR Negative      SARS CoV2 PCR Negative     LACTIC ACID WHOLE BLOOD WITH 1X REPEAT IN 2 HR WHEN >2 - Normal    Lactic Acid, Initial 2.0     KETONE BETA-HYDROXYBUTYRATE QUANTITATIVE, RAPID - Normal    Ketone (Beta-Hydroxybutyrate) Quantitative 0.27     GLUCOSE MONITOR NURSING POCT   KETONE BETA-HYDROXYBUTYRATE QUANTITATIVE, RAPID    Ketone (Beta-Hydroxybutyrate) Quantitative       KETONE BETA-HYDROXYBUTYRATE QUANTITATIVE, RAPID    Ketone (Beta-Hydroxybutyrate) Quantitative       GLUCOSE MONITOR NURSING POCT       Imaging   Chest XR,  PA & LAT   Final Result   IMPRESSION: Negative chest.        Independent Interpretation   I personally evaluated the CXR, no obvious PNA noted.    ED Course      Medications Administered   Medications   acetaminophen (TYLENOL) tablet 975 mg (975 mg Oral $Given 11/27/24 1925)   sodium chloride 0.9% BOLUS 1,000 mL (1,000 mLs Intravenous $New Bag 11/27/24 2245)       Procedures   Procedures     Discussion of Management   None    ED Course        Additional Documentation  None    Medical Decision Making / Diagnosis     CMS Diagnoses: None    MIPS       None    MDM   Eric Fall is a 29 year old male who presents to the ED for evaluation of cough and URI symptoms. Patient does note episodes of emesis as well. See HPI as above for additional details. Vitals and physical exam as above. DDx was broad and included viral URI, PNA, PND, amongst others. CXR negative for PNA. COVID, influenza, RSV swabs return negative. While patient was awaiting test  results, he asked for blood sugar check. This returned elevated at 437. Patient initially said he was prediabetic, but check review notes history of diabetes with A1C at 6.7. Patient is currently on Metformin and states he has been taking this. Given his vomiting and description of generalized weakness, I did thereafter obtain labs. Fortunately, patient without evidence for DKA. Fluids provided here. Hyperglycemia likely exacerbated by patient's concurrent illness. Advised that patient follow up with PCP in the next 1-2 weeks for glucose recheck and consideration of repeat A1C. Will defer to PCP to change Metformin dosing if needed. With respect to URI symptoms, advised Tylenol and ibuprofen, discussed importance of hydration. Do feel patient is safe for discharge to home. Discussed reasons to return. All questions answered. Patient discharged to home in stable condition.    Disposition   The patient was discharged.     Diagnosis     ICD-10-CM    1. Viral URI with cough  J06.9       2. Hyperglycemia  R73.9            Discharge Medications   New Prescriptions    No medications on file       This record was created at least in part using electronic voice recognition software, so please excuse any typographical errors.         Stephan Mock PA-C  11/28/24 0039

## 2024-12-06 ENCOUNTER — HOSPITAL ENCOUNTER (EMERGENCY)
Facility: CLINIC | Age: 29
Discharge: HOME | End: 2024-12-06
Attending: EMERGENCY MEDICINE | Admitting: EMERGENCY MEDICINE
Payer: MEDICARE

## 2024-12-06 VITALS
HEIGHT: 67 IN | OXYGEN SATURATION: 99 % | DIASTOLIC BLOOD PRESSURE: 80 MMHG | TEMPERATURE: 98.2 F | RESPIRATION RATE: 20 BRPM | BODY MASS INDEX: 35.29 KG/M2 | WEIGHT: 224.87 LBS | SYSTOLIC BLOOD PRESSURE: 160 MMHG | HEART RATE: 110 BPM

## 2024-12-06 DIAGNOSIS — E11.65 HYPERGLYCEMIA DUE TO DIABETES MELLITUS (H): ICD-10-CM

## 2024-12-06 LAB
ALBUMIN SERPL BCG-MCNC: 4.8 G/DL (ref 3.5–5.2)
ALP SERPL-CCNC: 152 U/L (ref 40–150)
ALT SERPL W P-5'-P-CCNC: 22 U/L (ref 0–70)
ANION GAP SERPL CALCULATED.3IONS-SCNC: 15 MMOL/L (ref 7–15)
AST SERPL W P-5'-P-CCNC: 23 U/L (ref 0–45)
B-OH-BUTYR SERPL-SCNC: 0.2 MMOL/L
BASOPHILS # BLD AUTO: 0.1 10E3/UL (ref 0–0.2)
BASOPHILS NFR BLD AUTO: 1 %
BILIRUB SERPL-MCNC: 0.5 MG/DL
BUN SERPL-MCNC: 8.1 MG/DL (ref 6–20)
CALCIUM SERPL-MCNC: 9.7 MG/DL (ref 8.8–10.4)
CHLORIDE SERPL-SCNC: 95 MMOL/L (ref 98–107)
CREAT SERPL-MCNC: 0.88 MG/DL (ref 0.67–1.17)
EGFRCR SERPLBLD CKD-EPI 2021: >90 ML/MIN/1.73M2
EOSINOPHIL # BLD AUTO: 0.2 10E3/UL (ref 0–0.7)
EOSINOPHIL NFR BLD AUTO: 3 %
ERYTHROCYTE [DISTWIDTH] IN BLOOD BY AUTOMATED COUNT: 12.5 % (ref 10–15)
GLUCOSE BLDC GLUCOMTR-MCNC: 320 MG/DL (ref 70–99)
GLUCOSE SERPL-MCNC: 315 MG/DL (ref 70–99)
HCO3 SERPL-SCNC: 23 MMOL/L (ref 22–29)
HCT VFR BLD AUTO: 40.4 % (ref 40–53)
HGB BLD-MCNC: 13.9 G/DL (ref 13.3–17.7)
HOLD SPECIMEN: NORMAL
HOLD SPECIMEN: NORMAL
IMM GRANULOCYTES # BLD: 0 10E3/UL
IMM GRANULOCYTES NFR BLD: 1 %
LIPASE SERPL-CCNC: 43 U/L (ref 13–60)
LYMPHOCYTES # BLD AUTO: 2.3 10E3/UL (ref 0.8–5.3)
LYMPHOCYTES NFR BLD AUTO: 35 %
MCH RBC QN AUTO: 30.6 PG (ref 26.5–33)
MCHC RBC AUTO-ENTMCNC: 34.4 G/DL (ref 31.5–36.5)
MCV RBC AUTO: 89 FL (ref 78–100)
MONOCYTES # BLD AUTO: 0.4 10E3/UL (ref 0–1.3)
MONOCYTES NFR BLD AUTO: 5 %
NEUTROPHILS # BLD AUTO: 3.6 10E3/UL (ref 1.6–8.3)
NEUTROPHILS NFR BLD AUTO: 55 %
NRBC # BLD AUTO: 0 10E3/UL
NRBC BLD AUTO-RTO: 0 /100
PLATELET # BLD AUTO: 282 10E3/UL (ref 150–450)
POTASSIUM SERPL-SCNC: 3.8 MMOL/L (ref 3.4–5.3)
PROT SERPL-MCNC: 7.9 G/DL (ref 6.4–8.3)
RBC # BLD AUTO: 4.54 10E6/UL (ref 4.4–5.9)
SODIUM SERPL-SCNC: 133 MMOL/L (ref 135–145)
WBC # BLD AUTO: 6.6 10E3/UL (ref 4–11)

## 2024-12-06 PROCEDURE — 82010 KETONE BODYS QUAN: CPT | Performed by: EMERGENCY MEDICINE

## 2024-12-06 PROCEDURE — 85025 COMPLETE CBC W/AUTO DIFF WBC: CPT | Performed by: EMERGENCY MEDICINE

## 2024-12-06 PROCEDURE — 80053 COMPREHEN METABOLIC PANEL: CPT | Performed by: EMERGENCY MEDICINE

## 2024-12-06 PROCEDURE — 82962 GLUCOSE BLOOD TEST: CPT

## 2024-12-06 PROCEDURE — 99284 EMERGENCY DEPT VISIT MOD MDM: CPT

## 2024-12-06 PROCEDURE — 36415 COLL VENOUS BLD VENIPUNCTURE: CPT | Performed by: EMERGENCY MEDICINE

## 2024-12-06 PROCEDURE — 82040 ASSAY OF SERUM ALBUMIN: CPT | Performed by: EMERGENCY MEDICINE

## 2024-12-06 PROCEDURE — 82947 ASSAY GLUCOSE BLOOD QUANT: CPT | Performed by: EMERGENCY MEDICINE

## 2024-12-06 PROCEDURE — 83690 ASSAY OF LIPASE: CPT | Performed by: EMERGENCY MEDICINE

## 2024-12-06 ASSESSMENT — ACTIVITIES OF DAILY LIVING (ADL): ADLS_ACUITY_SCORE: 46

## 2024-12-07 NOTE — ED PROVIDER NOTES
"  Emergency Department Note      History of Present Illness     Chief Complaint   Hyperglycemia      HPI   Eric Fall is a 29 year old male with a history including but not limited to diabetes mellitus type 2, DKA, hypertension, and TBI who presents to the ED with hyperglycemia. He states that while being seen by his primary today he returned a blood sugar over 500, prompting ED visit now. He states he is asymptomatic. He takes metformin 500mg twice daily, he has been adhering to this medication as prescribed. He has not taken his evening dose today.     Independent Historian   None    Review of External Notes   ED note reviewed from 11/27/24    Past Medical History     Medical History and Problem List   ADHD   Adult antisocial behavior  Chemical abuse  Chronic kidney disease  Conversion disorder  Diabetes mellitus  Fetal alcohol syndrome  Hip dysplasia, congenital  Hypertension  Intermittent explosive disorder  Migraine  MR (mental retardation), moderate  Obesity  PTSD   Schizoaffective disorder, bipolar type  Seizures  TBI (traumatic brain injury)  VUR (vesicoureteric reflux)  Pseudoseizures  Anal fissure  Suicidal ideation  Psychotic disorder    Medications   Tylenol   Lipitor  Atropine  Catapres  Fazaclo  Desmopressin  Linzess  Zestril  Latuda  Glucophage  Zofran  Protonix  Senokot  Imitrex  Zanaflex  Topamax    Surgical History   Past Surgical History:   Procedure Laterality Date    HIP SURGERY  03/2015    Hip surgery with pin placement for SCFE  2010       Physical Exam     Patient Vitals for the past 24 hrs:   BP Temp Temp src Pulse Resp SpO2 Height Weight   12/06/24 1936 (!) 160/80 98.2  F (36.8  C) Oral 110 20 99 % 1.702 m (5' 7\") 102 kg (224 lb 13.9 oz)     Physical Exam  General: No acute distress.  Head: No obvious trauma to head.  Eyes:  Conjunctivae clear.   Neck: Normal range of motion.   CV: Skin is well perfused, no cyanosis  Respiratory: Effort normal. No audible " wheezing.  Gastrointestinal: Non-distended.  Musculoskeletal: Normal range of motion. No gross deformities.  Neuro: Alert. Moving all extremities appropriately.  Normal speech.    Skin: No rashes or lesions on exposed skin.    Diagnostics     Lab Results   Labs Ordered and Resulted from Time of ED Arrival to Time of ED Departure   COMPREHENSIVE METABOLIC PANEL - Abnormal       Result Value    Sodium 133 (*)     Potassium 3.8      Carbon Dioxide (CO2) 23      Anion Gap 15      Urea Nitrogen 8.1      Creatinine 0.88      GFR Estimate >90      Calcium 9.7      Chloride 95 (*)     Glucose 315 (*)     Alkaline Phosphatase 152 (*)     AST 23      ALT 22      Protein Total 7.9      Albumin 4.8      Bilirubin Total 0.5     GLUCOSE BY METER - Abnormal    GLUCOSE BY METER POCT 320 (*)    LIPASE - Normal    Lipase 43     KETONE BETA-HYDROXYBUTYRATE QUANTITATIVE, RAPID - Normal    Ketone (Beta-Hydroxybutyrate) Quantitative 0.20     CBC WITH PLATELETS AND DIFFERENTIAL    WBC Count 6.6      RBC Count 4.54      Hemoglobin 13.9      Hematocrit 40.4      MCV 89      MCH 30.6      MCHC 34.4      RDW 12.5      Platelet Count 282      % Neutrophils 55      % Lymphocytes 35      % Monocytes 5      % Eosinophils 3      % Basophils 1      % Immature Granulocytes 1      NRBCs per 100 WBC 0      Absolute Neutrophils 3.6      Absolute Lymphocytes 2.3      Absolute Monocytes 0.4      Absolute Eosinophils 0.2      Absolute Basophils 0.1      Absolute Immature Granulocytes 0.0      Absolute NRBCs 0.0       Imaging   No orders to display     Independent Interpretation   None    ED Course      Medications Administered   Medications - No data to display    Procedures   Procedures     Discussion of Management   None    ED Course   ED Course as of 12/07/24 0053   Fri Dec 06, 2024   2056 I obtained history and examined the patient as noted above.       Additional Documentation  None    Medical Decision Making / Diagnosis     CMS Diagnoses:  None    [facility]       None    [facility]   Eric MORE Traci Fall is a 29 year old male presenting with hyperglycemia.  Glucose is 315.  There is no concern for DKA.  Ketones are nonelevated, he is not acidotic, no anion gap.  He appears well and reports he is asymptomatic.  He is currently taking 500 mg of metformin twice daily.  I instructed him to increase this to 1000 mg twice daily and I am giving him an updated prescription.  He is instructed to follow-up with his primary care provider.  Return precautions are given and he verbalizes understanding.  He is discharged home in stable condition.    Disposition   The patient was discharged.     Diagnosis     ICD-10-CM    1. Hyperglycemia due to diabetes mellitus (H)  E11.65            Discharge Medications   Discharge Medication List as of 12/6/2024  9:22 PM          Scribe Disclosure:  I, IBRAHIMA ALTMAN, am serving as a scribe at 9:04 PM on 12/6/2024 to document services personally performed by Brendan Banerjee MD based on my observations and the provider's statements to me.      Brendan Banerjee MD  12/07/24 0056

## 2024-12-07 NOTE — ED TRIAGE NOTES
Pt reports his doctor wanted him to come to the ED to get his blood sugar checked. Pt denies symptoms. Pt takes metformin BID. Pt doesn't check his blood sugars at the group [facility] Link #[phone] manager  [phone] is  the house #

## 2024-12-07 NOTE — DISCHARGE INSTRUCTIONS
Your blood sugar is high, 320, but you do not have diabetic ketoacidosis.  Your other blood levels all appear normal.  We want you to increase your metformin dose from 500 mg twice a day, to 1000 mg twice a day.  I am sending a new prescription.  In the meantime, please double your metformin dose, so tonight you will take 2 tablets of metformin.  Please follow-up with your primary care provider.  Return to the emergency department if you develop any new or concerning symptoms.

## 2025-02-27 ENCOUNTER — HOSPITAL ENCOUNTER (EMERGENCY)
Facility: CLINIC | Age: 30
Discharge: HOME | End: 2025-02-27
Attending: EMERGENCY MEDICINE
Payer: MEDICARE

## 2025-02-27 VITALS
WEIGHT: 226.19 LBS | TEMPERATURE: 98 F | HEIGHT: 67 IN | OXYGEN SATURATION: 96 % | RESPIRATION RATE: 16 BRPM | DIASTOLIC BLOOD PRESSURE: 79 MMHG | BODY MASS INDEX: 35.5 KG/M2 | SYSTOLIC BLOOD PRESSURE: 115 MMHG | HEART RATE: 99 BPM

## 2025-02-27 DIAGNOSIS — R00.0 SINUS TACHYCARDIA: ICD-10-CM

## 2025-02-27 DIAGNOSIS — K08.89 PAIN, DENTAL: ICD-10-CM

## 2025-02-27 LAB
ANION GAP SERPL CALCULATED.3IONS-SCNC: 14 MMOL/L (ref 7–15)
ATRIAL RATE - MUSE: 115 BPM
BASOPHILS # BLD AUTO: 0.1 10E3/UL (ref 0–0.2)
BASOPHILS NFR BLD AUTO: 1 %
BUN SERPL-MCNC: 17.7 MG/DL (ref 6–20)
CALCIUM SERPL-MCNC: 9.5 MG/DL (ref 8.8–10.4)
CHLORIDE SERPL-SCNC: 101 MMOL/L (ref 98–107)
CREAT SERPL-MCNC: 0.94 MG/DL (ref 0.67–1.17)
DIASTOLIC BLOOD PRESSURE - MUSE: NORMAL MMHG
EGFRCR SERPLBLD CKD-EPI 2021: >90 ML/MIN/1.73M2
EOSINOPHIL # BLD AUTO: 0.3 10E3/UL (ref 0–0.7)
EOSINOPHIL NFR BLD AUTO: 5 %
ERYTHROCYTE [DISTWIDTH] IN BLOOD BY AUTOMATED COUNT: 12.6 % (ref 10–15)
GLUCOSE SERPL-MCNC: 190 MG/DL (ref 70–99)
HCO3 SERPL-SCNC: 19 MMOL/L (ref 22–29)
HCT VFR BLD AUTO: 42.4 % (ref 40–53)
HGB BLD-MCNC: 14.8 G/DL (ref 13.3–17.7)
HOLD SPECIMEN: NORMAL
HOLD SPECIMEN: NORMAL
IMM GRANULOCYTES # BLD: 0 10E3/UL
IMM GRANULOCYTES NFR BLD: 0 %
INTERPRETATION ECG - MUSE: NORMAL
LYMPHOCYTES # BLD AUTO: 2.4 10E3/UL (ref 0.8–5.3)
LYMPHOCYTES NFR BLD AUTO: 37 %
MCH RBC QN AUTO: 31.4 PG (ref 26.5–33)
MCHC RBC AUTO-ENTMCNC: 34.9 G/DL (ref 31.5–36.5)
MCV RBC AUTO: 90 FL (ref 78–100)
MONOCYTES # BLD AUTO: 0.3 10E3/UL (ref 0–1.3)
MONOCYTES NFR BLD AUTO: 5 %
NEUTROPHILS # BLD AUTO: 3.5 10E3/UL (ref 1.6–8.3)
NEUTROPHILS NFR BLD AUTO: 53 %
NRBC # BLD AUTO: 0 10E3/UL
NRBC BLD AUTO-RTO: 0 /100
P AXIS - MUSE: 57 DEGREES
PLATELET # BLD AUTO: 294 10E3/UL (ref 150–450)
POTASSIUM SERPL-SCNC: 4 MMOL/L (ref 3.4–5.3)
PR INTERVAL - MUSE: 156 MS
QRS DURATION - MUSE: 88 MS
QT - MUSE: 312 MS
QTC - MUSE: 431 MS
R AXIS - MUSE: 17 DEGREES
RBC # BLD AUTO: 4.71 10E6/UL (ref 4.4–5.9)
SODIUM SERPL-SCNC: 134 MMOL/L (ref 135–145)
SYSTOLIC BLOOD PRESSURE - MUSE: NORMAL MMHG
T AXIS - MUSE: 46 DEGREES
VENTRICULAR RATE- MUSE: 115 BPM
WBC # BLD AUTO: 6.7 10E3/UL (ref 4–11)

## 2025-02-27 PROCEDURE — 96374 THER/PROPH/DIAG INJ IV PUSH: CPT | Mod: 59

## 2025-02-27 PROCEDURE — 85004 AUTOMATED DIFF WBC COUNT: CPT | Performed by: EMERGENCY MEDICINE

## 2025-02-27 PROCEDURE — 80048 BASIC METABOLIC PNL TOTAL CA: CPT | Performed by: EMERGENCY MEDICINE

## 2025-02-27 PROCEDURE — 250N000011 HC RX IP 250 OP 636: Performed by: EMERGENCY MEDICINE

## 2025-02-27 PROCEDURE — 96361 HYDRATE IV INFUSION ADD-ON: CPT

## 2025-02-27 PROCEDURE — 99284 EMERGENCY DEPT VISIT MOD MDM: CPT | Mod: 25

## 2025-02-27 PROCEDURE — 258N000003 HC RX IP 258 OP 636: Performed by: EMERGENCY MEDICINE

## 2025-02-27 PROCEDURE — 82565 ASSAY OF CREATININE: CPT | Performed by: EMERGENCY MEDICINE

## 2025-02-27 PROCEDURE — 36415 COLL VENOUS BLD VENIPUNCTURE: CPT | Performed by: EMERGENCY MEDICINE

## 2025-02-27 PROCEDURE — 250N000013 HC RX MED GY IP 250 OP 250 PS 637: Performed by: EMERGENCY MEDICINE

## 2025-02-27 PROCEDURE — 93005 ELECTROCARDIOGRAM TRACING: CPT

## 2025-02-27 PROCEDURE — 85048 AUTOMATED LEUKOCYTE COUNT: CPT | Performed by: EMERGENCY MEDICINE

## 2025-02-27 RX ORDER — ACETAMINOPHEN 500 MG
1000 TABLET ORAL ONCE
Status: COMPLETED | OUTPATIENT
Start: 2025-02-27 | End: 2025-02-27

## 2025-02-27 RX ORDER — KETOROLAC TROMETHAMINE 15 MG/ML
15 INJECTION, SOLUTION INTRAMUSCULAR; INTRAVENOUS ONCE
Status: COMPLETED | OUTPATIENT
Start: 2025-02-27 | End: 2025-02-27

## 2025-02-27 RX ADMIN — KETOROLAC TROMETHAMINE 15 MG: 15 INJECTION, SOLUTION INTRAMUSCULAR; INTRAVENOUS at 17:26

## 2025-02-27 RX ADMIN — AMOXICILLIN AND CLAVULANATE POTASSIUM 1 TABLET: 875; 125 TABLET, FILM COATED ORAL at 17:26

## 2025-02-27 RX ADMIN — SODIUM CHLORIDE 1000 ML: 9 INJECTION, SOLUTION INTRAVENOUS at 17:27

## 2025-02-27 RX ADMIN — ACETAMINOPHEN 1000 MG: 500 TABLET ORAL at 17:26

## 2025-02-27 ASSESSMENT — ACTIVITIES OF DAILY LIVING (ADL)
ADLS_ACUITY_SCORE: 46
ADLS_ACUITY_SCORE: 46

## 2025-02-27 NOTE — ED TRIAGE NOTES
Pt having right upper and lower molar pain causing headache for last week. No drainage per pt report. Tried to donate plasma yesterday but was told HR was too high -  in triage.

## 2025-02-27 NOTE — ED PROVIDER NOTES
Emergency Department Note      History of Present Illness     Chief Complaint   Dental Pain and Headache      HPI   Eric Fall is a 29 year old male who presents with several day history of left-sided dental pain.  He reports pain has been going off and on but is much more severe today.  He denies having taken any pain medication.  He states he contacted his dentist but does not have an appointment until June 17.  He reports he has been vomiting and states he is having difficulty eating and drinking.     Independent Historian   None    Review of External Notes   Reviewed PCP note from 2/19/25.  No mention of dental pain made at that visit.    Past Medical History     Medical History and Problem List   Past Medical History:   Diagnosis Date    ADHD (attention deficit hyperactivity disorder)     Adult antisocial behavior     Chemical abuse     CKD (chronic kidney disease)     Conversion disorder 2011    Diabetes mellitus     Fetal alcohol syndrome     Hip dysplasia, congenital     Hypertension     Intermittent explosive disorder     Migraine     MR (mental retardation), moderate     Obesity     PTSD (post-traumatic stress disorder)     Schizoaffective disorder, bipolar type     Seizures 11/2021    TBI (traumatic brain injury) 2009    VUR (vesicoureteric reflux)        Medications   acetaminophen (TYLENOL) 500 MG tablet  atorvastatin (LIPITOR) 10 MG tablet  atropine 1 % ophthalmic solution  cloNIDine (CATAPRES) 0.1 MG tablet  cloZAPine (FAZACLO) 100 MG ODT  desmopressin (DDAVP) 0.2 MG tablet  linaclotide (LINZESS) 290 MCG capsule  lisinopril (ZESTRIL) 5 MG tablet  lurasidone (LATUDA) 80 MG TABS tablet  melatonin 5 MG tablet  metFORMIN (GLUCOPHAGE) 500 MG tablet  Multiple Vitamins-Minerals (CERTA-JUAN-LUTEIN PO)  nicotine (COMMIT) 2 MG lozenge  ondansetron (ZOFRAN ODT) 4 MG ODT tab  ondansetron (ZOFRAN ODT) 4 MG ODT tab  pantoprazole (PROTONIX) 40 MG EC tablet  senna-docusate (SENOKOT-S/PERICOLACE) 8.6-50  "MG tablet  sodium bicarbonate 650 MG tablet  SUMAtriptan (IMITREX) 100 MG tablet  tiZANidine (ZANAFLEX) 4 MG tablet  topiramate (TOPAMAX) 100 MG tablet        Surgical History   Past Surgical History:   Procedure Laterality Date    HIP SURGERY  03/2015    Hip surgery with pin placement for SCFE  2010       Physical Exam     Patient Vitals for the past 24 hrs:   BP Temp Temp src Pulse Resp SpO2 Height Weight   02/27/25 1520 126/84 98  F (36.7  C) Oral (!) 126 16 98 % 1.702 m (5' 7\") 102.6 kg (226 lb 3.1 oz)     Physical Exam  General: Patient is alert and interactive when I enter the room  Head:  The scalp, face, and head appear normal  Eyes:  Conjunctivae are normal  ENT:    The nose is normal    Pinnae are normal    External acoustic canals are normal    TMJs are without crepitus/clicking bilaterally.     Dental inspection shows caries, no periapical abscess.     Floor of mouth is soft.  No peritonsillar abscess. No uvulitis or swelling.    Neck:  Trachea midline  CV:  Tachycardic. Regular rhythm. No murmur.   Resp:  No respiratory distress   Musc:  Normal muscular tone  Skin:  No rash or lesions noted. No facial swelling or erythema.  Neuro:  Speech is normal and fluent. Face is symmetric.     Moving all extremities well.  Facial and trigeminal nerve are tested and intact.       Diagnostics     Lab Results   Labs Ordered and Resulted from Time of ED Arrival to Time of ED Departure   BASIC METABOLIC PANEL - Abnormal       Result Value    Sodium 134 (*)     Potassium 4.0      Chloride 101      Carbon Dioxide (CO2) 19 (*)     Anion Gap 14      Urea Nitrogen 17.7      Creatinine 0.94      GFR Estimate >90      Calcium 9.5      Glucose 190 (*)    CBC WITH PLATELETS AND DIFFERENTIAL    WBC Count 6.7      RBC Count 4.71      Hemoglobin 14.8      Hematocrit 42.4      MCV 90      MCH 31.4      MCHC 34.9      RDW 12.6      Platelet Count 294      % Neutrophils 53      % Lymphocytes 37      % Monocytes 5      % Eosinophils 5 "      % Basophils 1      % Immature Granulocytes 0      NRBCs per 100 WBC 0      Absolute Neutrophils 3.5      Absolute Lymphocytes 2.4      Absolute Monocytes 0.3      Absolute Eosinophils 0.3      Absolute Basophils 0.1      Absolute Immature Granulocytes 0.0      Absolute NRBCs 0.0           EKG   ECG results from 02/27/25   EKG 12 lead     Value    Systolic Blood Pressure     Diastolic Blood Pressure     Ventricular Rate 115    Atrial Rate 115    AZ Interval 156    QRS Duration 88        QTc 431    P Axis 57    R AXIS 17    T Axis 46    Interpretation ECG      Sinus tachycardia  Otherwise normal ECG  When compared with ECG of 04-Sep-2024 14:35,  No significant change was found           Independent Interpretation   None    ED Course      Medications Administered   Medications   sodium chloride 0.9% BOLUS 1,000 mL (has no administration in time range)   ketorolac (TORADOL) injection 15 mg (has no administration in time range)   acetaminophen (TYLENOL) tablet 1,000 mg (has no administration in time range)   amoxicillin-clavulanate (AUGMENTIN) 875-125 MG per tablet 1 tablet (has no administration in time range)           Discussion of Management   None    ED Course   The patient arrived in triage where vitals were measured and recorded.   The patient was then escorted back to the emergency department.   The patient's medical records were reviewed.  Nursing notes and vitals were reviewed.    I performed an exam of the patient as documented above. The patient is in agreement with my plan of care.       Additional Documentation  None    Medical Decision Making / Diagnosis     CMS Diagnoses: None    MIPS       None    MDM   Eric MORE Traci Fall is a 29 year old male who presents with who presents with dental pain.  Also complaining of rapid heart rate.  Regarding his heart rate, he has sinus tachycardia documented chronically and on review of his medical record, is very frequently tachycardic in the hospital and  in the ED.  He did receive some IV fluid here with improvement of his presentation heart rate.  Suspect little faster than normal due to vomiting as well as pain.  He does report improvement of his pain after Toradol and Tylenol here.  I recommended ongoing use of Tylenol and ibuprofen for home for treatment of his dental pain.  No evidence of an abscess.  No evidence of deep space infection.  He can safely follow-up with a dentist as an outpatient.  I did give him a list of dental resources in the community.  We will prescribe Augmentin for treatment of suspected dental infection.  First dose given in ED. He is in stable condition at the time of discharge, red flags that should merit ED return were discussed as well as recommended follow-up instructions. All questions were answered and he is in agreement with the plan. I discussed with his  as well.       Disposition   The patient was discharged.     Diagnosis     ICD-10-CM    1. Pain, dental  K08.89       2. Sinus tachycardia  R00.0            Discharge Medications   New Prescriptions    AMOXICILLIN-CLAVULANATE (AUGMENTIN) 875-125 MG TABLET    Take 1 tablet by mouth 2 times daily for 7 days.                Chema Silveira MD  02/27/25 8568

## 2025-02-28 LAB
ATRIAL RATE - MUSE: 115 BPM
DIASTOLIC BLOOD PRESSURE - MUSE: NORMAL MMHG
INTERPRETATION ECG - MUSE: NORMAL
P AXIS - MUSE: 57 DEGREES
PR INTERVAL - MUSE: 156 MS
QRS DURATION - MUSE: 88 MS
QT - MUSE: 312 MS
QTC - MUSE: 431 MS
R AXIS - MUSE: 17 DEGREES
SYSTOLIC BLOOD PRESSURE - MUSE: NORMAL MMHG
T AXIS - MUSE: 46 DEGREES
VENTRICULAR RATE- MUSE: 115 BPM

## 2025-02-28 NOTE — DISCHARGE INSTRUCTIONS
For pain - can take tylenol 1,000mg every 6 hours and/or  Ibuprofen 600mg every 6 hours    It's safe to take these together.   Continue antibiotics as prescribed.     Follow-up with dentist ASAP. See additional clinics below.       Dental Resources  Name/Address/Phone Eligibility Hours Fee   [facility]  [address], [address]  [place] [address]  [phone] Anyone Call for appointment [facility]  Medical Assistance  Private Insurance   [facility]  [address] [place] [address]  [phone] Anyone Call for appointment    [facility] refers to low-cost dental clinics for non-preventive care    [place] Interpreters available Prices start at   Adults        Cleaning $36-$160        X-Ray $20-40  Children        Cleaning $15        X-ray $10-20        Fluoride $10  Accepts cash, check or credit;  Does not take insurance or MA.   [facility]  [address] [place]  [address]  [phone] Anyone Afternoons and evenings    September-May    Answers phones after 10 AM $30.00 per visit   ($15.00 per visit if 62 or older)   Preventive care.  Restoration care; sliding fee; MA   [facility]  [address] [place] [address]  [phone] Children birth to age 18 and pregnant women    [facility] Residents without insurance will be asked to apply for Assured Care. M TH F 8:30 am - 5 pm  T W 8:30 am - 7 pm    30 locations metro wide    Call for appointment and to confirm hours. Sliding Fee  [facility]  Medical Assistance  Assured Access  Private Insurance    8 Languages Spoken   [facility] [address] [address]  [phone] Anyone Call for appointment Sliding Fee  Accept insurance, [place],   [place] and self-pay.  Call if no insurance.    All services provided.  Staff fluent in Hmong, Laotian, [place] Islander, Haitian, Frisian, Uruguayan, and Farsi.   [facility]  [address] [place] [address]  [phone] Children  Adults in a walk in basis Mon - Fri. 8 - 5 pm       (closed 1-2 pm)  General Dentists & Hygienists  Private Dentists  Dentures Fees based on family size and income ranging from 40% - 100% payment by patient.   [facility]  [address] [place]  [address]    [phone] Anyone Mon - Fri 7:30 am - 5:00 pm  By Appt.    Tues & Wed @ 3:00 call for urgent care Appt for next day service Sliding fee:  MA; Insurance   [facility]  Dental [facility]  [address] [place]  [address]  [phone] Anyone Call for an appointment.  Days open vary every semester. Adult cleaning $25  Child cleaning $15  X-Rays $10-$15  Whitening services available  $75, includes cleaning  Seniors 50% off   [facility]  [address] [place]  [address]  [phone] Anyone M-F 8 am - 5 pm Most insurances accepted.  MA and Sliding Scale.   [facility]  [address] [place] [address]  [address]    [phone] Anyone without insurance Call to make appt   M-F 9:00 am - 5 pm   (Closed noon hour 12-1)    6 pm- 8 pm Evening hours also available for care Sliding fee based on income and size of household.   [facility]  Dental Clinic  [address] [place] [address]  [address]  [phone] press option 1    For the [facility] press option 4 Anyone              Anyone Monday  4 - 6:30 pm  Tuesday 12:30 - 3 & 4-6:30  Thursday 8:30 - 11 am & 12-2:30 pm  September through May only    All year around on Thursdays from 5-9 pm (only time a dentist is in.) Cleanings & X-Rays Only  Cleanings:  Adults $30                   Kids $20  X-Rays:  Adults $34                Kids $10    MA and Sliding fee   [address] [address]    [phone] Anyone    (ong interpreters available) M-F 8:00  am - 5:00 pm       By appointment only  (same day appointments available) Sliding fee ($40+ may be due at appointment, remainder billed); MA; Insurance   [facility]  [address] [place], [place] [phone]   Anyone    (Hmong interpreters available) M-Th 8:00 am - 8:00 pm  F 8:00 am - 5:00 pm    By appointment only  (same day appointments available) Sliding fee ($40+ may be due at appointment, remainder billed); MA; Insurance   [facility]  [address] [place], [place]  [address] [phone] Anyone    Must live within [facility] to qualify for sliding fee services Mon, Tues, Thurs, Fri  8:30 am - 5:00 pm  Wed. 8:30 am - 7:00 pm  All other services by appt. only Sliding Fee; MA   Offer payment plans    Have financial workers that will assist with [place] and will use sliding fee for those who do not qualify.      [facility]  [address] [place] [address]  [phone] Anyone without insurance     Hours and day of week vary  (Call ahead for hours)    Walk-in only Free Services    Cleanings; Fillings; Extractions   The [address]  [phone] Anyone Call for an appointment Accept patients with [place] and Medical Assistance.  10% discount if bill is paid in full on day of service.  No sliding fee scale.     [facility]  [address] [place] [phone] Anyone M-F  8 am-5 pm  Call for appt.    Walk-in hours 8 am - 11am and 1 pm - 5 pm, however take scheduled appointments first    No emergency services or oral surgeries Sliding Fee available with an [place] or [place] denial letter and proof of income.    Accepts Assured Access card and MA coverage.     Name/Address/Phone Eligibility Hours Fee   [facility]  [address] [place], [place]  [phone] Anyone with emergencies only M & W clinic  begins at 6 pm   Call ahead    Alternate Fridays for children by Appt only Free   [facility]  [address] [place] [address]  [phone]    Emergencies (adults only):  [phone] Anyone Free walk-in screens for oral surgery    Call ahead for hours    All other services by appt. only  Accepts MA for pediatrics only    Rates are about 25% - 40% less than private dental office.    No sliding fee scale   [facility]  [address] [place] [address]  [phone] Anyone as long as they do not have health insurance Hours on Mondays, Tuesdays, and Thursdays Sliding fees based on monthly income    No root canals, tooth pulls or emergencies   [facility]  [address] [place] [phone] Anyone  M - F 8:00 am - 5:00 pm  Wed 8:00 am - 8 pm Sliding fees; MA; Insurance   [facility] [address]  [address] [phone] Anyone age 55+ M - F 8:00 am - 4:30 pm    Appt. only Set slightly lower fees;  MA; Insurance         Give Kids a smile day in [facility] Children Takes place in February at a few locations                 Discharge Instructions  Dental Pain    You have been seen today for a toothache. Your pain may be caused by an exposed nerve, an infection (pulpitis), a root abscess (pocket of pus), or other problems. You will need to see a dentist for a solution to your tooth problem. Emergency Department care is only to help control your problem until you can see a dentist; we cannot provide complete dental care.  Today, we did not find any sign that your toothache was caused by any dangerous or life-threatening condition, but sometimes symptoms develop over time and cannot be found during an emergency visit, so it is very important that you follow up with your dentist.      Generally, every Emergency Department visit should have a follow-up clinic visit with either a primary  or a specialty clinic/provider. Please follow-up as instructed by your emergency provider today.    Return to the Emergency Department if:  You develop a new fever over 100.4 F.  You cannot open your mouth normally, cannot move your tongue well, or cannot swallow.  You have new or increased swelling of your face or neck.  You develop drainage of pus or foul smelling material from around your tooth.  What can I do to help myself?  Take any antibiotic the provider may have prescribed for you today.  Avoid very hot or very cold foods as both can cause pain.  Make an appointment to see a dentist as soon as possible. Dentists are generally not  on-staff  at [facility] so we cannot  refer  to you to dentist but we may be able to provide a list of dental clinics to help you.  If you were given a prescription for medicine here today, be sure to read all of the information (including the package insert) that comes with your prescription.  This will include important information about the medicine, its side effects, and any warnings that you need to know about.  The pharmacist who fills the prescription can provide more information and answer questions you may have about the medicine.  If you have questions or concerns that the pharmacist cannot address, please call or return to the Emergency Department.   Remember that you can always come back to the Emergency Department if you are not able to see your regular provider in the amount of time listed above, if you get any new symptoms, or if there is anything that worries you.

## 2025-04-08 NOTE — ED PROVIDER NOTES
History   Chief Complaint:  Constipation and Nausea & Vomiting     The history is provided by the patient.      Eric Fall is a 26 year old male with history of diabetes, seizures, TBI, and schizoaffective disorder who presents with constipation, nausea, and vomiting. He notes he has been admitted to the hospital from 6/16-5/31 and has been constipated since then.  The patient was seen here in the ED two days ago for constipation and vomiting. At that time, he had a pink lady enema and was sent home with polyethylene glycol. He is still experience constipation, nausea, and vomiting. This morning, he took his magnesium citrate and subsequently vomited. He feels his constipation has progressed over the past weeks and he is also no longer able to pass gas. He denies any fever. He does mention chest pain and shortness of breath but these have been ongoing issues.     Review of Systems   Constitutional: Negative for fever.   Respiratory: Negative for shortness of breath.    Cardiovascular: Negative for chest pain.   Gastrointestinal: Positive for constipation, nausea and vomiting.   All other systems reviewed and are negative.    Allergies:  Haloperidol  Lithium  Olanzapine  Quetiapine  Amphetamine-Dextroamphetamine  Dextroamphetamine  Trazodone  Peanut (Diagnostic)  Risperdal  Valproic Acid    Medications:  Atorvastatin   Cogentin  Thorazine  Catapres  Desmopressin   Flonase  Atarax  Lisinopril  Latuda  Metformin  Zofran  Protonix  Imitrex  Topamax     Past Medical History:     ADHD  Chemical abuse  Conversion disorder  Diabetes mellitus  Fetal alcohol syndrome  Hip dysplasia  Intermittent explosive disorder  Mental retardation   PTSD  Schizoaffective disorder, bipolar type   Seizures  TBI    Past Surgical History:    Bilateral hip surgery with pin placement      Family History:    The patient has no known family history.    Social History:  The patient presents to the ED with his group home  What Is The Reason For Today's Visit?: Full Body Skin Examination What Is The Reason For Today's Visit? (Being Monitored For X): the re-examination of lesions previously examined worker.      Physical Exam     Patient Vitals for the past 24 hrs:   BP Temp Temp src Pulse Resp SpO2   06/04/22 1535 (!) 125/96 -- -- 111 (!) 35 98 %   06/04/22 1530 (!) 125/96 -- -- 103 23 99 %   06/04/22 1500 (!) 128/95 -- -- 106 20 100 %   06/04/22 1400 (!) 110/93 -- -- 110 (!) 47 100 %   06/04/22 1310 (!) 148/93 96.9  F (36.1  C) Temporal (!) 126 20 98 %     Physical Exam  General: Alert, appears well-developed and well-nourished. Cooperative.     In mild distress  HEENT:  Head:  Atraumatic  Ears:  External ears are normal  Mouth/Throat:  Oropharynx is without erythema or exudate and mucous membranes are moist.   Eyes:   Conjunctivae normal and EOM are normal. No scleral icterus.  CV:  Tachycardic rate, regular rhythm, normal heart sounds and radial pulses are 2+ and symmetric.  No murmur.  Resp:  Breath sounds are clear bilaterally    Non-labored, no retractions or accessory muscle use  GI:  Abdomen is soft, distended, mild tenderness to all quadrants.  No rebound or guarding.  No CVA tenderness bilaterally  Rectal: No stool in rectal vault.  No anal fissure or hemorrhoids.   MS:  Normal range of motion. No edema.    Normal strength in all 4 extremities.     Back atraumatic.    No midline cervical, thoracic, or lumbar tenderness  Skin:  Warm and dry.  No rash or lesions noted.  Neuro: Alert. Normal strength.  GCS: 15  Psych:  Normal mood and affect.    Emergency Department Course     Imaging:  CT Abdomen Pelvis w Contrast   Final Result   IMPRESSION:    1.  Moderate to severe stool burden within the colon consistent with constipation.   2.  No evidence of a bowel obstruction or inflammatory process.   3.  Moderate to severe left hip and mild to moderate right hip osteoarthritis.        Report per radiology    Laboratory:  Labs Ordered and Resulted from Time of ED Arrival to Time of ED Departure   COMPREHENSIVE METABOLIC PANEL - Abnormal       Result Value    Sodium 134      Potassium 3.6      Chloride 103       Carbon Dioxide (CO2) 24      Anion Gap 7      Urea Nitrogen 10      Creatinine 1.06      Calcium 10.1      Glucose 173 (*)     Alkaline Phosphatase 98      AST 19      ALT 35      Protein Total 9.1 (*)     Albumin 4.7      Bilirubin Total 0.7      GFR Estimate >90     CBC WITH PLATELETS AND DIFFERENTIAL - Abnormal    WBC Count 13.4 (*)     RBC Count 5.03      Hemoglobin 15.8      Hematocrit 46.4      MCV 92      MCH 31.4      MCHC 34.1      RDW 11.9      Platelet Count 398      % Neutrophils 79      % Lymphocytes 15      % Monocytes 5      % Eosinophils 0      % Basophils 1      % Immature Granulocytes 0      NRBCs per 100 WBC 0      Absolute Neutrophils 10.5 (*)     Absolute Lymphocytes 2.0      Absolute Monocytes 0.7      Absolute Eosinophils 0.1      Absolute Basophils 0.1      Absolute Immature Granulocytes 0.0      Absolute NRBCs 0.0     TROPONIN I - Normal    Troponin I High Sensitivity 4            Emergency Department Course:             Reviewed:  I reviewed nursing notes, vitals and past medical history    Assessments:  1405 I obtained history and examined the patient as noted above.   1545 I rechecked the patient and explained findings.   1655 I spoke with the patient to discuss his treatment plan.     Consults:  1531 I spoke with Yvette Chang, internal medicine PA-C, who agreed to accept the patient.     Interventions:  1353 NS 1L IV Bolus  1354 Zofran 4 mg IV  1424 Toradol 15 mg IV  1601 Magnesium citrate solution 296 mL PO    Disposition:  The patient was discharged to home after declining observation admission.     Impression & Plan     CMS Diagnoses: None      Medical Decision Making:  Eric Fall is a 26 year old male with a past medical history pertinent for diabetes, cognitive delay, and bipolar schizoaffective disorder, who presents for evaluation for decreased stool output, nausea, and vomiting. Signs and symptoms are consistent with constipation. He reports he has not had significant  bowel movements for multiple weeks. He also is now unable to pass gas and is vomiting his stool softeners and laxatives at home. CT abdomen/pelvis was obtained to rule out SBO or other intraabdominal catastrophes. Patient received a liter of NS as well as Toradol and Zofran while in the ED for symptom management. His labs are overall reassuring but he does have a mildly elevated WBC. I spoke with Yvette Chang PA-C, who will accept the patient for further treatment of constipation due to abd pain, n/v with oral constipation meds at home.     Patient later chose that he would prefer outpatient management at this time and has declined hospital admission. I feel that he can safely be managed as an outpatient and I discussed with him red-flag signs that would warrant further ED evaluation. I advised him to follow-up with his primary care and patient is agreeable to this plan. Discharged back to group home.     Diagnosis:    ICD-10-CM    1. Constipation, unspecified constipation type  K59.00    2. Non-intractable vomiting with nausea, unspecified vomiting type  R11.2    3. Abdominal pain, generalized  R10.84        Scribe Disclosure:  I, Jocelyne Parminder, am serving as a scribe at 2:02 PM on 6/4/2022 to document services personally performed by Yung Ferreira MD based on my observations and the provider's statements to me.              Yung Ferreira MD  06/04/22 2041

## 2025-04-24 ENCOUNTER — APPOINTMENT (OUTPATIENT)
Dept: GENERAL RADIOLOGY | Facility: CLINIC | Age: 30
End: 2025-04-24
Attending: EMERGENCY MEDICINE
Payer: MEDICARE

## 2025-04-24 ENCOUNTER — HOSPITAL ENCOUNTER (EMERGENCY)
Facility: CLINIC | Age: 30
Discharge: HOME | End: 2025-04-24
Attending: EMERGENCY MEDICINE
Payer: MEDICARE

## 2025-04-24 VITALS
TEMPERATURE: 97.8 F | DIASTOLIC BLOOD PRESSURE: 75 MMHG | HEART RATE: 107 BPM | SYSTOLIC BLOOD PRESSURE: 128 MMHG | RESPIRATION RATE: 20 BRPM | OXYGEN SATURATION: 97 %

## 2025-04-24 DIAGNOSIS — J40 BRONCHITIS: ICD-10-CM

## 2025-04-24 DIAGNOSIS — R07.9 CHEST PAIN, UNSPECIFIED TYPE: ICD-10-CM

## 2025-04-24 DIAGNOSIS — R11.10 POST-TUSSIVE EMESIS: ICD-10-CM

## 2025-04-24 LAB
ALBUMIN SERPL BCG-MCNC: 4.5 G/DL (ref 3.5–5.2)
ALP SERPL-CCNC: 87 U/L (ref 40–150)
ALT SERPL W P-5'-P-CCNC: 38 U/L (ref 0–70)
ANION GAP SERPL CALCULATED.3IONS-SCNC: 15 MMOL/L (ref 7–15)
AST SERPL W P-5'-P-CCNC: 39 U/L (ref 0–45)
ATRIAL RATE - MUSE: 110 BPM
BASOPHILS # BLD AUTO: 0.1 10E3/UL (ref 0–0.2)
BASOPHILS NFR BLD AUTO: 1 %
BILIRUB SERPL-MCNC: 0.5 MG/DL
BUN SERPL-MCNC: 10.9 MG/DL (ref 6–20)
CALCIUM SERPL-MCNC: 8.9 MG/DL (ref 8.8–10.4)
CHLORIDE SERPL-SCNC: 104 MMOL/L (ref 98–107)
CREAT SERPL-MCNC: 0.91 MG/DL (ref 0.67–1.17)
DIASTOLIC BLOOD PRESSURE - MUSE: NORMAL MMHG
EGFRCR SERPLBLD CKD-EPI 2021: >90 ML/MIN/1.73M2
EOSINOPHIL # BLD AUTO: 0.3 10E3/UL (ref 0–0.7)
EOSINOPHIL NFR BLD AUTO: 6 %
ERYTHROCYTE [DISTWIDTH] IN BLOOD BY AUTOMATED COUNT: 12.9 % (ref 10–15)
FLUAV RNA SPEC QL NAA+PROBE: NEGATIVE
FLUBV RNA RESP QL NAA+PROBE: NEGATIVE
GLUCOSE SERPL-MCNC: 157 MG/DL (ref 70–99)
HCO3 SERPL-SCNC: 19 MMOL/L (ref 22–29)
HCT VFR BLD AUTO: 38.8 % (ref 40–53)
HGB BLD-MCNC: 13.8 G/DL (ref 13.3–17.7)
HOLD SPECIMEN: NORMAL
HOLD SPECIMEN: NORMAL
IMM GRANULOCYTES # BLD: 0 10E3/UL
IMM GRANULOCYTES NFR BLD: 0 %
INTERPRETATION ECG - MUSE: NORMAL
LIPASE SERPL-CCNC: 45 U/L (ref 13–60)
LYMPHOCYTES # BLD AUTO: 1.3 10E3/UL (ref 0.8–5.3)
LYMPHOCYTES NFR BLD AUTO: 24 %
MCH RBC QN AUTO: 32.2 PG (ref 26.5–33)
MCHC RBC AUTO-ENTMCNC: 35.6 G/DL (ref 31.5–36.5)
MCV RBC AUTO: 90 FL (ref 78–100)
MONOCYTES # BLD AUTO: 0.8 10E3/UL (ref 0–1.3)
MONOCYTES NFR BLD AUTO: 14 %
NEUTROPHILS # BLD AUTO: 3 10E3/UL (ref 1.6–8.3)
NEUTROPHILS NFR BLD AUTO: 55 %
NRBC # BLD AUTO: 0 10E3/UL
NRBC BLD AUTO-RTO: 0 /100
P AXIS - MUSE: 58 DEGREES
PLATELET # BLD AUTO: 252 10E3/UL (ref 150–450)
POTASSIUM SERPL-SCNC: 3.5 MMOL/L (ref 3.4–5.3)
PR INTERVAL - MUSE: 158 MS
PROT SERPL-MCNC: 7.6 G/DL (ref 6.4–8.3)
QRS DURATION - MUSE: 80 MS
QT - MUSE: 326 MS
QTC - MUSE: 441 MS
R AXIS - MUSE: 37 DEGREES
RBC # BLD AUTO: 4.29 10E6/UL (ref 4.4–5.9)
RSV RNA SPEC NAA+PROBE: NEGATIVE
S PYO DNA THROAT QL NAA+PROBE: NOT DETECTED
SARS-COV-2 RNA RESP QL NAA+PROBE: NEGATIVE
SODIUM SERPL-SCNC: 138 MMOL/L (ref 135–145)
SYSTOLIC BLOOD PRESSURE - MUSE: NORMAL MMHG
T AXIS - MUSE: 40 DEGREES
TROPONIN T SERPL HS-MCNC: 14 NG/L
TROPONIN T SERPL HS-MCNC: 8 NG/L
VENTRICULAR RATE- MUSE: 110 BPM
WBC # BLD AUTO: 5.4 10E3/UL (ref 4–11)

## 2025-04-24 PROCEDURE — 250N000009 HC RX 250: Performed by: EMERGENCY MEDICINE

## 2025-04-24 PROCEDURE — 85004 AUTOMATED DIFF WBC COUNT: CPT | Performed by: EMERGENCY MEDICINE

## 2025-04-24 PROCEDURE — 87798 DETECT AGENT NOS DNA AMP: CPT | Performed by: EMERGENCY MEDICINE

## 2025-04-24 PROCEDURE — 87651 STREP A DNA AMP PROBE: CPT | Performed by: EMERGENCY MEDICINE

## 2025-04-24 PROCEDURE — 96361 HYDRATE IV INFUSION ADD-ON: CPT

## 2025-04-24 PROCEDURE — 250N000011 HC RX IP 250 OP 636: Performed by: EMERGENCY MEDICINE

## 2025-04-24 PROCEDURE — 96375 TX/PRO/DX INJ NEW DRUG ADDON: CPT

## 2025-04-24 PROCEDURE — 96374 THER/PROPH/DIAG INJ IV PUSH: CPT | Mod: 59

## 2025-04-24 PROCEDURE — 93005 ELECTROCARDIOGRAM TRACING: CPT

## 2025-04-24 PROCEDURE — 258N000003 HC RX IP 258 OP 636: Performed by: EMERGENCY MEDICINE

## 2025-04-24 PROCEDURE — 96376 TX/PRO/DX INJ SAME DRUG ADON: CPT

## 2025-04-24 PROCEDURE — 84484 ASSAY OF TROPONIN QUANT: CPT | Performed by: EMERGENCY MEDICINE

## 2025-04-24 PROCEDURE — 83690 ASSAY OF LIPASE: CPT | Performed by: EMERGENCY MEDICINE

## 2025-04-24 PROCEDURE — 87637 SARSCOV2&INF A&B&RSV AMP PRB: CPT | Performed by: EMERGENCY MEDICINE

## 2025-04-24 PROCEDURE — 99285 EMERGENCY DEPT VISIT HI MDM: CPT | Mod: 25

## 2025-04-24 PROCEDURE — 71046 X-RAY EXAM CHEST 2 VIEWS: CPT

## 2025-04-24 PROCEDURE — 36415 COLL VENOUS BLD VENIPUNCTURE: CPT | Performed by: EMERGENCY MEDICINE

## 2025-04-24 PROCEDURE — 94640 AIRWAY INHALATION TREATMENT: CPT

## 2025-04-24 PROCEDURE — 80053 COMPREHEN METABOLIC PANEL: CPT | Performed by: EMERGENCY MEDICINE

## 2025-04-24 RX ORDER — BENZONATATE 200 MG/1
200 CAPSULE ORAL 3 TIMES DAILY PRN
Qty: 15 CAPSULE | Refills: 0 | Status: SHIPPED | OUTPATIENT
Start: 2025-04-24

## 2025-04-24 RX ORDER — AZITHROMYCIN 250 MG/1
TABLET, FILM COATED ORAL
Qty: 6 TABLET | Refills: 0 | Status: SHIPPED | OUTPATIENT
Start: 2025-04-24 | End: 2025-04-29

## 2025-04-24 RX ORDER — ONDANSETRON 2 MG/ML
4 INJECTION INTRAMUSCULAR; INTRAVENOUS ONCE
Status: COMPLETED | OUTPATIENT
Start: 2025-04-24 | End: 2025-04-24

## 2025-04-24 RX ORDER — IPRATROPIUM BROMIDE AND ALBUTEROL SULFATE 2.5; .5 MG/3ML; MG/3ML
3 SOLUTION RESPIRATORY (INHALATION) ONCE
Status: COMPLETED | OUTPATIENT
Start: 2025-04-24 | End: 2025-04-24

## 2025-04-24 RX ORDER — PREDNISONE 20 MG/1
TABLET ORAL
Qty: 10 TABLET | Refills: 0 | Status: SHIPPED | OUTPATIENT
Start: 2025-04-24

## 2025-04-24 RX ORDER — METHYLPREDNISOLONE SODIUM SUCCINATE 125 MG/2ML
125 INJECTION INTRAMUSCULAR; INTRAVENOUS ONCE
Status: COMPLETED | OUTPATIENT
Start: 2025-04-24 | End: 2025-04-24

## 2025-04-24 RX ORDER — ALBUTEROL SULFATE 90 UG/1
INHALANT RESPIRATORY (INHALATION)
Qty: 18 G | Refills: 0 | Status: SHIPPED | OUTPATIENT
Start: 2025-04-24

## 2025-04-24 RX ORDER — ONDANSETRON 4 MG/1
4 TABLET, ORALLY DISINTEGRATING ORAL EVERY 8 HOURS PRN
Qty: 10 TABLET | Refills: 0 | Status: SHIPPED | OUTPATIENT
Start: 2025-04-24 | End: 2025-04-27

## 2025-04-24 RX ORDER — ONDANSETRON 2 MG/ML
INJECTION INTRAMUSCULAR; INTRAVENOUS
Status: COMPLETED
Start: 2025-04-24 | End: 2025-04-24

## 2025-04-24 RX ADMIN — ONDANSETRON 4 MG: 2 INJECTION INTRAMUSCULAR; INTRAVENOUS at 17:47

## 2025-04-24 RX ADMIN — IPRATROPIUM BROMIDE AND ALBUTEROL SULFATE 3 ML: .5; 3 SOLUTION RESPIRATORY (INHALATION) at 17:11

## 2025-04-24 RX ADMIN — IPRATROPIUM BROMIDE AND ALBUTEROL SULFATE 3 ML: .5; 3 SOLUTION RESPIRATORY (INHALATION) at 17:05

## 2025-04-24 RX ADMIN — METHYLPREDNISOLONE SODIUM SUCCINATE 125 MG: 125 INJECTION, POWDER, FOR SOLUTION INTRAMUSCULAR; INTRAVENOUS at 16:22

## 2025-04-24 RX ADMIN — SODIUM CHLORIDE 1000 ML: 0.9 INJECTION, SOLUTION INTRAVENOUS at 16:20

## 2025-04-24 RX ADMIN — ONDANSETRON 4 MG: 2 INJECTION, SOLUTION INTRAMUSCULAR; INTRAVENOUS at 16:21

## 2025-04-24 ASSESSMENT — ACTIVITIES OF DAILY LIVING (ADL)
ADLS_ACUITY_SCORE: 46

## 2025-04-24 ASSESSMENT — COLUMBIA-SUICIDE SEVERITY RATING SCALE - C-SSRS
6. HAVE YOU EVER DONE ANYTHING, STARTED TO DO ANYTHING, OR PREPARED TO DO ANYTHING TO END YOUR LIFE?: NO
1. IN THE PAST MONTH, HAVE YOU WISHED YOU WERE DEAD OR WISHED YOU COULD GO TO SLEEP AND NOT WAKE UP?: NO
2. HAVE YOU ACTUALLY HAD ANY THOUGHTS OF KILLING YOURSELF IN THE PAST MONTH?: NO

## 2025-04-24 NOTE — ED PROVIDER NOTES
Emergency Department Note      History of Present Illness     Chief Complaint   Cough, Chest Pain, and Pharyngitis      HPI   Eric Fall is a 29 year old male with a history of asthma, hypertension, and type 2 diabetes presenting to the Emergency Department for evaluation of cough. Patient reports a worsening cough beginning three days ago. Also endorses burning centralized chest pain, frontal headache, sore/swollen throat, shortness of breath, chills, general weakness, and emesis triggered by the cough. He has not been able to retain food or fluids. No fever or abdominal pain.    Independent Historian   None    Review of External Notes   ***    Past Medical History     Medical History and Problem List   ADHD   Adult antisocial behavior  Chemical abuse  CKD  Conversion disorder  Diabetes mellitus. Type 2  Fetal alcohol syndrome  Hip dysplasia, congenital  Hypertension  Migraine  Obesity  PTSD  Schizoaffective disorder  Seizures  TBI   VUR    Medications   Lipitor  Clonidine  Clozapine  Desmopressin  Linzess  Zestril  Latuda  Metformin  Zofran  Senna-docusate  Imitrex  Zanaflex  Topamax    Surgical History   Hip surgery x2    Physical Exam     Patient Vitals for the past 24 hrs:   BP Temp Temp src Pulse Resp SpO2   04/24/25 1744 -- -- -- -- -- 96 %   04/24/25 1739 -- -- -- -- -- 95 %   04/24/25 1734 -- -- -- -- -- 97 %   04/24/25 1729 -- -- -- -- -- 96 %   04/24/25 1724 -- -- -- -- -- 100 %   04/24/25 1719 -- -- -- -- -- 100 %   04/24/25 1714 (!) 142/102 -- -- 106 -- --   04/24/25 1528 137/90 97.8  F (36.6  C) Temporal (!) 122 20 97 %     Physical Exam    Gen: alert  CV: RRR  ENT: Ears normal. Mouth normal.   Pulm: Bilateral expiratory wheezing. breath sounds equal, lungs clear. Oral pharynx clear.   Abd: Soft, nontender  Back: no evidence of injury, no cva tenderness  MSK: no deformity, moves all extremities  Skin: no rash  Neuro: alert, appropriate conversation and interaction     Diagnostics     Lab  Results   Labs Ordered and Resulted from Time of ED Arrival to Time of ED Departure   COMPREHENSIVE METABOLIC PANEL - Abnormal       Result Value    Sodium 138      Potassium 3.5      Carbon Dioxide (CO2) 19 (*)     Anion Gap 15      Urea Nitrogen 10.9      Creatinine 0.91      GFR Estimate >90      Calcium 8.9      Chloride 104      Glucose 157 (*)     Alkaline Phosphatase 87      AST 39      ALT 38      Protein Total 7.6      Albumin 4.5      Bilirubin Total 0.5     CBC WITH PLATELETS AND DIFFERENTIAL - Abnormal    WBC Count 5.4      RBC Count 4.29 (*)     Hemoglobin 13.8      Hematocrit 38.8 (*)     MCV 90      MCH 32.2      MCHC 35.6      RDW 12.9      Platelet Count 252      % Neutrophils 55      % Lymphocytes 24      % Monocytes 14      % Eosinophils 6      % Basophils 1      % Immature Granulocytes 0      NRBCs per 100 WBC 0      Absolute Neutrophils 3.0      Absolute Lymphocytes 1.3      Absolute Monocytes 0.8      Absolute Eosinophils 0.3      Absolute Basophils 0.1      Absolute Immature Granulocytes 0.0      Absolute NRBCs 0.0     INFLUENZA A/B, RSV AND SARS-COV2 PCR - Normal    Influenza A PCR Negative      Influenza B PCR Negative      RSV PCR Negative      SARS CoV2 PCR Negative     TROPONIN T, HIGH SENSITIVITY - Normal    Troponin T, High Sensitivity 14     LIPASE - Normal    Lipase 45     GROUP A STREPTOCOCCUS PCR THROAT SWAB - Normal    Group A strep by PCR Not Detected     TROPONIN T, HIGH SENSITIVITY   BORDETELLA PERTUSSIS PARAPERTUSSIS, PCR       Imaging   Chest XR,  PA & LAT   Final Result   IMPRESSION: Negative chest.          EKG   ECG taken at 1543, ECG read at 1601  Sinus tachycardia   Rate 110 bpm. AR interval 158 ms. QRS duration 80 ms. QT/QTc 326/441 ms. P-R-T axes 58 37 40.    Independent Interpretation   CXR: No infiltrate or pleural effusion.    ED Course      Medications Administered   Medications   methylPREDNISolone Na Suc (solu-MEDROL) injection 125 mg (125 mg Intravenous $Given  4/24/25 1622)   ipratropium - albuterol 0.5 mg/2.5 mg/3 mL (DUONEB) neb solution 3 mL (3 mLs Nebulization $Given 4/24/25 1711)   ipratropium - albuterol 0.5 mg/2.5 mg/3 mL (DUONEB) neb solution 3 mL (3 mLs Nebulization $Given 4/24/25 1705)   sodium chloride 0.9% BOLUS 1,000 mL (0 mLs Intravenous Stopped 4/24/25 1711)   ondansetron (ZOFRAN) injection 4 mg (4 mg Intravenous $Given 4/24/25 1621)   ondansetron (ZOFRAN) injection 4 mg (4 mg Intravenous $Given 4/24/25 1747)       Procedures   Procedures       Discussion of Management   None    ED Course   ED Course as of 04/24/25 1937   Thu Apr 24, 2025   1613 I obtained history and examined the patient as noted above.     1912 I rechecked and updated the patient. Feels better, tolerated PO, wants to go home. Wheezing improved.         Additional Documentation  None    Medical Decision Making / Diagnosis     CMS Diagnoses: None    MIPS       None    MDM   Eric Fall is a 29 year old male ***    Disposition   The patient was discharged.     Diagnosis   No diagnosis found.     Discharge Medications   New Prescriptions    No medications on file         Scribe Disclosure:  I, Bernard Jasmine, am serving as a scribe at 4:35 PM on 4/24/2025 to document services personally performed by Kateryna Mcgregor MD based on my observations and the provider's statements to me.      Discharged home    Disposition   The patient was discharged.     Diagnosis     ICD-10-CM    1. Bronchitis  J40       2. Post-tussive emesis  R11.10       3. Chest pain, unspecified type  R07.9            Discharge Medications   Discharge Medication List as of 4/24/2025  7:36 PM        START taking these medications    Details   albuterol (PROAIR HFA/PROVENTIL HFA/VENTOLIN HFA) 108 (90 Base) MCG/ACT inhaler Take 2-4 puffs of your albuterol inhaler o4x per day for 2 days.  Then, you may use your inhaler every 4 hours as needed for cough, wheezing or shortness of breath., Disp-18 g, R-0, E-PrescribePharmacy may dispense brand covered by insurance (Proair, or  proventil or ventolin or generic albuterol inhaler)      azithromycin (ZITHROMAX) 250 MG tablet Take 2 tablets (500 mg) by mouth daily for 1 day, THEN 1 tablet (250 mg) daily for 4 days., Disp-6 tablet, R-0, E-Prescribe      benzonatate (TESSALON) 200 MG capsule Take 1 capsule (200 mg) by mouth 3 times daily as needed for cough., Disp-15 capsule, R-0, E-Prescribe      predniSONE (DELTASONE) 20 MG tablet Take two tablets (= 40mg) each day for 5 (five) days, Disp-10 tablet, R-0, E-Prescribe               Scribe Disclosure:  I, Bernard Jasmine, am serving as a scribe at 4:35 PM on 4/24/2025 to document services personally performed by Kateryna Mcgregor MD based on my observations and the provider's statements to me.        Kateryna Mcgregor MD  04/25/25 0110

## 2025-04-24 NOTE — ED NOTES
Large watery emesis in room and refused Xray at this time. Pt given zofran; reports less nausea. Xray paged again.

## 2025-04-24 NOTE — ED TRIAGE NOTES
"Arrives ambulatory from the community. States he has chest pain, \"lung pain\" states cough and sore throat and \"My tonsil is swollen\". States the pain in his chest feels like it is burning in the center of his chest. States he throws up because is chest is burning.     States this started a couple of days ago.     States unable to sleep because of the symptoms. States has felt chilled but did not measure a temperature.       Tylenol last around 1400.   "

## 2025-04-25 LAB
ATRIAL RATE - MUSE: 110 BPM
B PARAPERT DNA SPEC QL NAA+PROBE: NOT DETECTED
B PERT DNA SPEC QL NAA+PROBE: NOT DETECTED
DIASTOLIC BLOOD PRESSURE - MUSE: NORMAL MMHG
INTERPRETATION ECG - MUSE: NORMAL
P AXIS - MUSE: 58 DEGREES
PR INTERVAL - MUSE: 158 MS
QRS DURATION - MUSE: 80 MS
QT - MUSE: 326 MS
QTC - MUSE: 441 MS
R AXIS - MUSE: 37 DEGREES
SYSTOLIC BLOOD PRESSURE - MUSE: NORMAL MMHG
T AXIS - MUSE: 40 DEGREES
VENTRICULAR RATE- MUSE: 110 BPM

## 2025-04-25 NOTE — DISCHARGE INSTRUCTIONS
Take 2-4 puffs of your albuterol inhaler o4x per day for 2 days.  Then, you may use your inhaler every 4 hours as needed for cough, wheezing or shortness of breath.      Discharge Instructions  Bronchitis, Pneumonia, Bronchospasm    You were seen today for a chest infection or inflammation. If your provider decided this was due to a bacterial infection, you may need an antibiotic. Sometimes these are caused by a virus, and then an antibiotic will not help.     Generally, every Emergency Department visit should have a follow-up clinic visit with either a primary or a specialty clinic/provider. Please follow-up as instructed by your emergency provider today.    Return to the Emergency Department if:  Your breathing gets much worse.  You are very weak, or feel much more ill.  You develop new symptoms, such as chest pain.  You cough up blood.  You are vomiting (throwing up) enough that you cannot keep fluids or your medicine down.    What can I do to help myself?  Fill any prescriptions the provider gave you and take them right away--especially antibiotics. Be sure to finish the whole antibiotic prescription.  You may be given a prescription for an inhaler, which can help loosen tight air passages.  Use this as needed, but not more often than directed. Inhalers work much better when used with a spacer.   You may be given a prescription for a steroid to reduce inflammation. Used long-term, these can have side effects, but for short-term use they are safe. You may notice restlessness or increased appetite.      You may use non-prescription cough or cold medicines. Cough medicines may help, but don t make the cough go away completely.   Avoid smoke, because this can make your symptoms worse. If you smoke, this may be a good time to quit! Consider using nicotine lozenges, gum, or patches to reduce cravings.   If you have a fever, Tylenol  (acetaminophen), Motrin  (ibuprofen), or Advil  (ibuprofen) may help bring fever down  and may help you feel more comfortable. Be sure to read and follow the package directions, and ask your provider if you have questions.  Be sure to get your flu shot each year.  For certain ages, the pneumonia shot can help prevent pneumonia.  If you were given a prescription for medicine here today, be sure to read all of the information (including the package insert) that comes with your prescription.  This will include important information about the medicine, its side effects, and any warnings that you need to know about.  The pharmacist who fills the prescription can provide more information and answer questions you may have about the medicine.  If you have questions or concerns that the pharmacist cannot address, please call or return to the Emergency Department.     Remember that you can always come back to the Emergency Department if you are not able to see your regular provider in the amount of time listed above, if you get any new symptoms, or if there is anything that worries you.

## (undated) RX ORDER — HYDROCODONE BITARTRATE AND ACETAMINOPHEN 5; 325 MG/1; MG/1
TABLET ORAL
Status: DISPENSED
Start: 2022-10-29